# Patient Record
Sex: FEMALE | Race: WHITE | NOT HISPANIC OR LATINO | Employment: OTHER | URBAN - NONMETROPOLITAN AREA
[De-identification: names, ages, dates, MRNs, and addresses within clinical notes are randomized per-mention and may not be internally consistent; named-entity substitution may affect disease eponyms.]

---

## 2017-01-09 ENCOUNTER — AMBULATORY - GICH (OUTPATIENT)
Dept: RADIOLOGY | Facility: OTHER | Age: 77
End: 2017-01-09

## 2017-01-09 DIAGNOSIS — R92.8 OTHER ABNORMAL AND INCONCLUSIVE FINDINGS ON DIAGNOSTIC IMAGING OF BREAST: ICD-10-CM

## 2017-02-28 ENCOUNTER — AMBULATORY - GICH (OUTPATIENT)
Dept: SCHEDULING | Facility: OTHER | Age: 77
End: 2017-02-28

## 2017-03-22 ENCOUNTER — AMBULATORY - GICH (OUTPATIENT)
Dept: PEDIATRICS | Facility: OTHER | Age: 77
End: 2017-03-22

## 2017-03-24 ENCOUNTER — AMBULATORY - GICH (OUTPATIENT)
Dept: PEDIATRICS | Facility: OTHER | Age: 77
End: 2017-03-24

## 2017-06-22 ENCOUNTER — COMMUNICATION - GICH (OUTPATIENT)
Dept: SURGERY | Facility: OTHER | Age: 77
End: 2017-06-22

## 2017-06-22 ENCOUNTER — HISTORY (OUTPATIENT)
Dept: PEDIATRICS | Facility: OTHER | Age: 77
End: 2017-06-22

## 2017-06-22 ENCOUNTER — COMMUNICATION - GICH (OUTPATIENT)
Dept: PEDIATRICS | Facility: OTHER | Age: 77
End: 2017-06-22

## 2017-06-22 ENCOUNTER — OFFICE VISIT - GICH (OUTPATIENT)
Dept: PEDIATRICS | Facility: OTHER | Age: 77
End: 2017-06-22

## 2017-06-22 DIAGNOSIS — R13.14 DYSPHAGIA, PHARYNGOESOPHAGEAL PHASE: ICD-10-CM

## 2017-06-22 DIAGNOSIS — M15.0 PRIMARY GENERALIZED (OSTEO)ARTHRITIS: ICD-10-CM

## 2017-06-22 DIAGNOSIS — E78.2 MIXED HYPERLIPIDEMIA: ICD-10-CM

## 2017-06-22 DIAGNOSIS — E78.00 PURE HYPERCHOLESTEROLEMIA: ICD-10-CM

## 2017-06-22 DIAGNOSIS — E89.0 POSTPROCEDURAL HYPOTHYROIDISM: ICD-10-CM

## 2017-06-22 DIAGNOSIS — Z12.39 ENCOUNTER FOR OTHER SCREENING FOR MALIGNANT NEOPLASM OF BREAST: ICD-10-CM

## 2017-06-22 DIAGNOSIS — M85.89 OTHER SPECIFIED DISORDERS OF BONE DENSITY AND STRUCTURE, MULTIPLE SITES: ICD-10-CM

## 2017-06-22 DIAGNOSIS — R73.03 PREDIABETES: ICD-10-CM

## 2017-06-22 DIAGNOSIS — N39.41 URGE INCONTINENCE: ICD-10-CM

## 2017-06-22 DIAGNOSIS — E03.9 HYPOTHYROIDISM: ICD-10-CM

## 2017-06-22 DIAGNOSIS — F33.42 MAJOR DEPRESSIVE DISORDER, RECURRENT, IN FULL REMISSION (H): ICD-10-CM

## 2017-06-22 DIAGNOSIS — Z12.31 ENCOUNTER FOR SCREENING MAMMOGRAM FOR MALIGNANT NEOPLASM OF BREAST: ICD-10-CM

## 2017-06-22 DIAGNOSIS — I10 ESSENTIAL (PRIMARY) HYPERTENSION: ICD-10-CM

## 2017-06-22 DIAGNOSIS — E87.6 HYPOKALEMIA: ICD-10-CM

## 2017-06-22 DIAGNOSIS — R10.11 RIGHT UPPER QUADRANT PAIN: ICD-10-CM

## 2017-06-22 DIAGNOSIS — K44.9 DIAPHRAGMATIC HERNIA WITHOUT OBSTRUCTION OR GANGRENE: ICD-10-CM

## 2017-06-22 DIAGNOSIS — Z11.59 ENCOUNTER FOR SCREENING FOR OTHER VIRAL DISEASES (CODE): ICD-10-CM

## 2017-06-22 LAB
A/G RATIO - HISTORICAL: 1.4 (ref 1–2)
ABSOLUTE BASOPHILS - HISTORICAL: 0 THOU/CU MM
ABSOLUTE EOSINOPHILS - HISTORICAL: 0.1 THOU/CU MM
ABSOLUTE IMMATURE GRANULOCYTES(METAS,MYELOS,PROS) - HISTORICAL: 0 THOU/CU MM
ABSOLUTE LYMPHOCYTES - HISTORICAL: 1.6 THOU/CU MM (ref 0.9–2.9)
ABSOLUTE MONOCYTES - HISTORICAL: 0.6 THOU/CU MM
ABSOLUTE NEUTROPHILS - HISTORICAL: 3.4 THOU/CU MM (ref 1.7–7)
ALBUMIN SERPL-MCNC: 4 G/DL (ref 3.5–5.7)
ALP SERPL-CCNC: 82 IU/L (ref 34–104)
ALT (SGPT) - HISTORICAL: 19 IU/L (ref 7–52)
AMYLASE SERPL-CCNC: 15 IU/L (ref 29–103)
ANION GAP - HISTORICAL: 17 (ref 5–18)
AST SERPL-CCNC: 27 IU/L (ref 13–39)
BASOPHILS # BLD AUTO: 0.7 %
BILIRUB SERPL-MCNC: 0.9 MG/DL (ref 0.3–1)
BUN SERPL-MCNC: 15 MG/DL (ref 7–25)
BUN/CREAT RATIO - HISTORICAL: 18
CALCIUM SERPL-MCNC: 9 MG/DL (ref 8.6–10.3)
CHLORIDE SERPLBLD-SCNC: 96 MMOL/L (ref 98–107)
CHOL/HDL RATIO - HISTORICAL: 4
CHOLESTEROL TOTAL: 172 MG/DL
CO2 SERPL-SCNC: 30 MMOL/L (ref 21–31)
CREAT SERPL-MCNC: 0.85 MG/DL (ref 0.7–1.3)
EOSINOPHIL NFR BLD AUTO: 1.7 %
ERYTHROCYTE [DISTWIDTH] IN BLOOD BY AUTOMATED COUNT: 12.3 % (ref 11.5–15.5)
ESTIMATED AVERAGE GLUCOSE: 128 MG/DL
GFR IF NOT AFRICAN AMERICAN - HISTORICAL: >60 ML/MIN/1.73M2
GLOBULIN - HISTORICAL: 2.9 G/DL (ref 2–3.7)
GLUCOSE SERPL-MCNC: 105 MG/DL (ref 70–105)
HCT VFR BLD AUTO: 43.4 % (ref 33–51)
HDLC SERPL-MCNC: 43 MG/DL (ref 23–92)
HEMOGLOBIN A1C MONITORING (POCT) - HISTORICAL: 6.1 % (ref 4–6.2)
HEMOGLOBIN: 15.2 G/DL (ref 12–16)
HEPATITIS C ANTIBODY CATEGORY - HISTORICAL: NORMAL
IMMATURE GRANULOCYTES(METAS,MYELOS,PROS) - HISTORICAL: 0.3 %
LDLC SERPL CALC-MCNC: 88 MG/DL
LIPASE SERPL-CCNC: 10.5 IU/L (ref 11–82)
LYMPHOCYTES NFR BLD AUTO: 27.9 % (ref 20–44)
MCH RBC QN AUTO: 33.3 PG (ref 26–34)
MCHC RBC AUTO-ENTMCNC: 35 G/DL (ref 32–36)
MCV RBC AUTO: 95 FL (ref 80–100)
MONOCYTES NFR BLD AUTO: 10.8 %
NEUTROPHILS NFR BLD AUTO: 58.6 % (ref 42–72)
NON-HDL CHOLESTEROL - HISTORICAL: 129 MG/DL
PATIENT STATUS - HISTORICAL: ABNORMAL
PLATELET # BLD AUTO: 237 THOU/CU MM (ref 140–440)
PMV BLD: 10.4 FL (ref 6.5–11)
POTASSIUM SERPL-SCNC: 3 MMOL/L (ref 3.5–5.1)
PROT SERPL-MCNC: 6.9 G/DL (ref 6.4–8.9)
RED BLOOD COUNT - HISTORICAL: 4.57 MIL/CU MM (ref 4–5.2)
SODIUM SERPL-SCNC: 143 MMOL/L (ref 133–143)
TRIGL SERPL-MCNC: 204 MG/DL
TSH - HISTORICAL: 7.05 UIU/ML (ref 0.34–5.6)
WHITE BLOOD COUNT - HISTORICAL: 5.7 THOU/CU MM (ref 4.5–11)

## 2017-06-22 ASSESSMENT — PATIENT HEALTH QUESTIONNAIRE - PHQ9: SUM OF ALL RESPONSES TO PHQ QUESTIONS 1-9: 0

## 2017-07-20 ENCOUNTER — HISTORY (OUTPATIENT)
Dept: SURGERY | Facility: OTHER | Age: 77
End: 2017-07-20

## 2017-07-27 ENCOUNTER — HOSPITAL ENCOUNTER (OUTPATIENT)
Dept: SURGERY | Facility: OTHER | Age: 77
Discharge: HOME | End: 2017-07-27
Attending: SURGERY | Admitting: SURGERY

## 2017-07-27 ENCOUNTER — SURGERY (OUTPATIENT)
Dept: SURGERY | Facility: OTHER | Age: 77
End: 2017-07-27

## 2017-07-27 ENCOUNTER — HISTORY (OUTPATIENT)
Dept: SURGERY | Facility: OTHER | Age: 77
End: 2017-07-27

## 2017-07-27 DIAGNOSIS — K29.60 OTHER GASTRITIS WITHOUT BLEEDING: ICD-10-CM

## 2017-08-02 ENCOUNTER — AMBULATORY - GICH (OUTPATIENT)
Dept: LAB | Facility: OTHER | Age: 77
End: 2017-08-02

## 2017-08-02 DIAGNOSIS — E89.0 POSTPROCEDURAL HYPOTHYROIDISM: ICD-10-CM

## 2017-08-02 DIAGNOSIS — E87.6 HYPOKALEMIA: ICD-10-CM

## 2017-08-02 LAB
POTASSIUM SERPL-SCNC: 3.5 MMOL/L (ref 3.5–5.1)
TSH - HISTORICAL: 2.1 UIU/ML (ref 0.34–5.6)

## 2017-08-14 ENCOUNTER — HISTORY (OUTPATIENT)
Dept: PEDIATRICS | Facility: OTHER | Age: 77
End: 2017-08-14

## 2017-08-14 ENCOUNTER — OFFICE VISIT - GICH (OUTPATIENT)
Dept: PEDIATRICS | Facility: OTHER | Age: 77
End: 2017-08-14

## 2017-08-14 DIAGNOSIS — J30.9 ALLERGIC RHINITIS: ICD-10-CM

## 2017-08-14 DIAGNOSIS — J01.90 ACUTE SINUSITIS: ICD-10-CM

## 2017-08-14 DIAGNOSIS — B96.89 OTHER SPECIFIED BACTERIAL AGENTS AS THE CAUSE OF DISEASES CLASSIFIED ELSEWHERE: ICD-10-CM

## 2017-08-14 ASSESSMENT — PATIENT HEALTH QUESTIONNAIRE - PHQ9: SUM OF ALL RESPONSES TO PHQ QUESTIONS 1-9: 0

## 2017-09-01 ENCOUNTER — HOSPITAL ENCOUNTER (OUTPATIENT)
Dept: RADIOLOGY | Facility: OTHER | Age: 77
End: 2017-09-01
Attending: INTERNAL MEDICINE

## 2017-09-01 ENCOUNTER — HISTORY (OUTPATIENT)
Dept: RADIOLOGY | Facility: OTHER | Age: 77
End: 2017-09-01

## 2017-09-01 DIAGNOSIS — Z12.39 ENCOUNTER FOR OTHER SCREENING FOR MALIGNANT NEOPLASM OF BREAST: ICD-10-CM

## 2017-09-01 DIAGNOSIS — M85.89 OTHER SPECIFIED DISORDERS OF BONE DENSITY AND STRUCTURE, MULTIPLE SITES: ICD-10-CM

## 2017-09-01 DIAGNOSIS — Z12.31 ENCOUNTER FOR SCREENING MAMMOGRAM FOR MALIGNANT NEOPLASM OF BREAST: ICD-10-CM

## 2017-10-05 ENCOUNTER — AMBULATORY - GICH (OUTPATIENT)
Dept: PEDIATRICS | Facility: OTHER | Age: 77
End: 2017-10-05

## 2017-10-05 DIAGNOSIS — K29.60 OTHER GASTRITIS WITHOUT BLEEDING: ICD-10-CM

## 2017-10-24 ENCOUNTER — AMBULATORY - GICH (OUTPATIENT)
Dept: FAMILY MEDICINE | Facility: OTHER | Age: 77
End: 2017-10-24

## 2017-10-24 DIAGNOSIS — Z23 ENCOUNTER FOR IMMUNIZATION: ICD-10-CM

## 2017-11-16 ENCOUNTER — AMBULATORY - GICH (OUTPATIENT)
Dept: PEDIATRICS | Facility: OTHER | Age: 77
End: 2017-11-16

## 2017-11-16 DIAGNOSIS — K29.60 OTHER GASTRITIS WITHOUT BLEEDING: ICD-10-CM

## 2017-12-27 NOTE — PROGRESS NOTES
Patient Information     Patient Name MRN Madiha Ayon [id number] Female 1940      Progress Notes by Siddharth Kim MD at 2017  7:45 AM     Author:  Siddharth Kim MD Service:  (none) Author Type:  Physician     Filed:  2017 10:43 AM Encounter Date:  2017 Status:  Signed     :  Siddharth Kim MD (Physician)            Subjective  Madiha Beltran is a 77 y.o. female who presents for medication management, abdominal discomfort. She's been having pain in the right upper quadrant off and on. It feels like an aching without radiation. She's not found anything that makes it better or worse. She wonders if it might be related to her hiatal hernia flare. She feels this has flared because she ate a salad and she felt full up to her throat. Associated with nausea. She's not had to regurgitate any food. Sometimes fluids feel like they get stuck. This has been occurring off and on for several months. No chest pains or dyspnea. No lower extremity edema. No pleuritic chest pain. She fractured her right wrist after a fall in [place] which has healed up. She has a history of major depressive disorder which is in remission with the use of Prozac. History of hypertension which has been stable on hydrochlorothiazide, metoprolol. History of overflow urinary incontinence and continues on oxybutynin. History of hyperlipidemia which has been stable on Lipitor. She continues on aspirin prophylaxis. History of hypothyroidism stable on levothyroxine. History of osteoarthritis of multiple joints and continues on Celebrex. The last time she tried to stop Celebrex she had pain in all the joints of her body so she resumed it. She would like a mammogram, believes she is due at the end of the summer. Her colonoscopies are up-to-date, last was last year.    Review of Systems:  Constitutional: Normal  Eyes: Normal  Ears/nose/mouth/throat: See HPI  Cardiology/vascular: Normal  Respiratory:  "Normal  Psychiatric: Normal  GI: See HPI  : Normal  Musculoskeletal: See HPI. She's gotten shorter over the last year or 2. She's never been on Fosamax. She takes calcium plus vitamin D supplementation.  Neurological: Normal  Endocrine: Normal  Hematological/lymphatic: Normal  Integumentary: Normal  Allergy/immunizations: Normal      Problem List/PMH: reviewed in EMR, and made relevant updates today.  Medications: reviewed in EMR, and made relevant updates today.  Allergies: reviewed in EMR, and made relevant updates today.    Social Hx:  Social History     Substance Use Topics       Smoking status: Never Smoker     Smokeless tobacco: Never Used     Alcohol use No     Social History Narrative    , lives with  Janessa.     Moved into the apartments on [address] in [place]          I reviewed social history and made relevant updates today.    Family Hx:   Family History       Problem   Relation Age of Onset     Hypertension  Mother      Heart Disease  Mother      CHF       Hypertension  Father      Heart Disease  Father      CHF       Stroke  Maternal Grandmother      Hypertension  Maternal Grandmother      Stroke  Maternal Grandfather      Good Health  Maternal Grandfather      Emphysema       Cancer-breast  Other 37     fast growing       Diabetes  Brother      Good Health  Sister      Other  Neg.      no lupus or RA       Arthritis  Brother      psoriatic arthritis       Other  Brother       from burst blood vessel, pneumonia on Enbrel       Cancer-breast  Other      couple of aunts, youngest at mid 40s       Asthma  No Family History        Objective  Vitals: reviewed in EMR.  /86  Pulse 68  Ht 1.632 m (5' 4.25\")  Wt 85.7 kg (189 lb)  BMI 32.19 kg/m2    Gen: Pleasant female, NAD.  HEENT: MMM, no OP erythema.   Neck: Supple, no JVD, no bruits.  CV: RRR no m/r/g.   Pulm: CTAB no w/r/r  GI: Soft, NT, ND. No HSM. No masses. Bowel sounds present.  Neuro: Grossly intact  Msk: " No lower extremity edema.  Skin: No concerning lesions.  Psychiatric: Normal affect and insight. Does not appear anxious or depressed.    Diabetes Labs  Lab Results      Component  Value Date/Time    HGBA1C 6.1 06/22/2017 08:25 AM    CHOL 172 06/22/2017 08:25 AM    HDL 43 06/22/2017 08:25 AM    LDLCHOL 88 06/22/2017 08:25 AM    TRIGLYCERIDE 204 (H) 06/22/2017 08:25 AM    CREATININE 0.85 06/22/2017 08:25 AM     bone density scan report dated August 7, 2014 was reviewed with the patient today: Osteopenia    Assessment    ICD-10-CM    1. Esophageal dysphagia R13.14 GASTROSCOPY/EGD - [facility]   2. HYPERCHOLESTEROLEMIA E78.00 atorvastatin (LIPITOR) 40 mg tablet   3. Primary osteoarthritis involving multiple joints M15.0 celecoxib (CELEBREX) 200 mg capsule   4. Hypertension I10 hydroCHLOROthiazide (HCTZ) 25 mg tablet      metoprolol (LOPRESSOR) 100 mg tablet      COMPLETE METABOLIC PANEL      COMPLETE METABOLIC PANEL   5. Recurrent major depressive disorder, in full remission (HC) F33.42 FLUoxetine (PROZAC) 20 mg capsule   6. Post-surgical hypothyroidism E89.0 levothyroxine (SYNTHROID) 150 mcg tablet   7. Urge incontinence N39.41 oxybutynin (DITROPAN) 5 mg tablet   8. Hiatal hernia K44.9    9. Pre-diabetes R73.03 COMPLETE METABOLIC PANEL      HEMOGLOBIN A1C MONITORING (POCT)      COMPLETE METABOLIC PANEL      HEMOGLOBIN A1C MONITORING (POCT)   10. Breast cancer screening, high risk patient Z12.39 XR MAMMO BILAT SCREENING   11. RUQ abdominal pain R10.11 GASTROSCOPY/EGD - [facility]      COMPLETE METABOLIC PANEL      CBC WITH DIFFERENTIAL      LIPASE      AMYLASE      ANTI HCV      COMPLETE METABOLIC PANEL      CBC WITH DIFFERENTIAL      LIPASE      AMYLASE      ANTI HCV      CBC WITH AUTO DIFFERENTIAL   12. Mixed hyperlipidemia E78.2 LIPID PANEL      LIPID PANEL   13. Hypothyroidism, unspecified type E03.9 TSH      TSH   14. Encounter for screening mammogram for malignant neoplasm of breast  Z12.31 XR MAMMO BILAT SCREENING   15.  Osteopenia of multiple sites M85.89 XR DXA BONE DENSITY 2 SITES AXIAL   16. Need for hepatitis C screening test Z11.59 ANTI HCV      ANTI HCV       I'm concerned that her abdominal discomfort with dysphagia may be related to an esophageal stricture however differential diagnosis also includes esophageal cancer, external compression from lymphoma, esophagitis, others. Her right upper quadrant abdominal discomfort may be from gastritis however differential diagnosis also includes peptic ulcer disease, others. She is status post cholecystectomy.    Plan   -- Expected clinical course discussed   -- Medications and their side effects discussed  Patient Instructions    -- Schedule EGD   -- Work on 5% weight loss   -- Consider discontinue trial of celebrex if you have another great year with weight loss    Your BMI is Body mass index is 32.19 kg/(m^2).  (BMI ranges: Normal 18.5 - 25, Overweight 25 - 30, Obesity greater than 30,     Morbid Obesity greater than 40 or greater than 35 with associated conditions.)    Facts about losing weight:   -- Overweight and Obesity increase your risk for developing diabetes, high blood pressure and stroke, and shorten your life.   -- 90% of weight loss comes from dietary changes, only 10% from exercise    What should I do?   -- Work on 5-10% weight loss   -- Focus on a few healthy dietary changes   -- Eat more fresh fruits and vegetables, and fewer carbohydrates   -- Cut out all calorie-containing beverages (milk, juice, alcohol, etc)   -- Exercise every day   -- Weigh yourself once a week   -- Consider the DASH Diet (http://Mobilewalla.Loom Decor/DASHDiet - redirects to the Socogame)   -- Consider Weight Watchers (http://www.weightwatchBigML.com)   -- Consider My Fitness Pal (iOS, Android, http://www.Vastaripal.Cell>Point)            Signed, Siddharth Kim MD  Internal Medicine & Pediatrics

## 2017-12-28 NOTE — OR POSTOP
Patient Information     Patient Name MRN Sex Madiha Ly [id number] Female 1940      OR PostOp by Carol Galo RN at 2017  1:14 PM     Author:  Carol Galo RN Service:  (none) Author Type:  NURS- Registered Nurse     Filed:  2017  1:15 PM Date of Service:  2017  1:14 PM Status:  Signed     :  Carol Galo RN (NURS- Registered Nurse)            Discharge Note    Data:  Madiha Beltran has been discharged home at 1205 via ambulatory accompanied by Registered Nurse.      Action:  Written discharge/follow-up instructions were provided to patient. Prescriptions : None.  Belongings sent with patient. Medications from home sent with patient/family: Not Applicable  Equipment none .     Response:  Patient verbalized understanding of discharge instructions, reason for discharge, and necessary follow-up appointments.

## 2017-12-28 NOTE — TELEPHONE ENCOUNTER
Patient Information     Patient Name MRN Madhia Ayon [id number] Female 1940      Telephone Encounter by Catherine Christy MD at 2017  9:58 AM     Author:  Catherine Christy MD Service:  (none) Author Type:  Physician     Filed:  2017  9:58 AM Encounter Date:  2017 Status:  Signed     :  Catherine Christy MD (Physician)            Ok to schedule. Thanks! Catherine Christy MD ....................  2017   9:58 AM

## 2017-12-28 NOTE — OR ANESTHESIA
Patient Information     Patient Name MRN Sex     Madiha Beltran [id number] Female 1940      OR Anesthesia by Son Sanches CRNA at 2017 11:41 AM     Author:  Son Sanches CRNA Service:  (none) Author Type:  NURS- Nurse Anesthetist     Filed:  2017 11:42 AM Date of Service:  2017 11:41 AM Status:  Signed     :  Son Sanches CRNA (NURS- Nurse Anesthetist)            Anesthesia Post Operative Care Note    Name: Madiha Beltran  MRN:   [id number]  :    1940       Procedure Done:  See Surgeon Note        Anesthesia Technique    Anesthetic Type:  MAC       MAC Type:  NC     Oral Trauma:  No    Intraoperative Course   Hemodynamics:  Stable    Ventilation Normal:  Yes Lung Sounds:  Normal      PACU Course    Airway Status:  Extubated     Nondepolarizer Used:       Reversed: N/A   Hemodynamics:  Stable      Hydration: Euvolemic   Temperature:  36.1 - 38.3      Mental Status:  Awake, alert, follows commands   Pain Management:  Adequate   Regional Block:  No      Vital Signs:  Temp: 99  F (37.2  C)  Pulse: (!) 49  BP: 143/81  Resp: 16  SpO2: 97 %                       Active Lines:  Patient Lines/Drains/Airways Status    Active Line     Name: Placement date: Placement time: Site: Days:    PERIPHERAL VAD Right Hand 17   1030   Hand   less than 1                Intake & Output:       Labs:  No results for input(s): CV8QHUYRDWZ, GAO5BFQAEHAQ, PHARTERIAL, FQN9AEKVEROX, J2CGVCPUGUXO in the last 24 hours.    No results for input(s): MAGNESIUM in the last 24 hours.    No results for input(s): GLUCOSEMETER in the last 720 hours.        Son Sanches CRNA ....................  2017   11:41 AM

## 2017-12-28 NOTE — PROGRESS NOTES
Patient Information     Patient Name MRN Madiha Ayon [id number] Female 1940      Progress Notes by Siddharth Kim MD at 2017 11:30 AM     Author:  Siddharth Kim MD Service:  (none) Author Type:  Physician     Filed:  2017 11:57 AM Encounter Date:  2017 Status:  Signed     :  Siddharth Kim MD (Physician)            Subjective  Madiha Beltran is a 77 y.o. female who presents for possible sinus infection. Symptoms started a week ago Saturday. She has developed thick green nasal drainage. She's coughing a lot. She can't sleep due to coughing. She's had a sore throat but that's improved. No tenderness over the maxillary or facial areas. No tooth pain. She what has been sneezing. She's had itchy watery eyes. No fever. No popping of the ears. No known sick contacts. No travel. She moved into a new apartment but was living there for 3 weeks before symptoms developed. They have new carpet and painting. Last winter the same thing occurred when she was in [place] and she attributed to 2 much dust. She's been having heartburn complicated by history of hiatal hernia and recently had testing done by Dr. Christy who recommended the addition of sucralfate.    Problem List/PMH: reviewed in EMR, and made relevant updates today.  Medications: reviewed in EMR, and made relevant updates today.  Allergies: reviewed in EMR, and made relevant updates today.    Social Hx:  Social History     Substance Use Topics       Smoking status: Never Smoker     Smokeless tobacco: Never Used     Alcohol use No     Social History Narrative    , lives with  Janessa.     Moved into the apartments on [address]     in [place]          I reviewed social history and made relevant updates today.    Family Hx:   Family History       Problem   Relation Age of Onset     Hypertension  Mother      Heart Disease  Mother      CHF       Hypertension  Father      Heart Disease  Father      CHF    "    Stroke  Maternal Grandmother      Hypertension  Maternal Grandmother      Stroke  Maternal Grandfather      Good Health  Maternal Grandfather      Emphysema       Cancer-breast  Other 37     fast growing       Diabetes  Brother      Good Health  Sister      Other  Neg.      no lupus or RA       Arthritis  Brother      psoriatic arthritis       Other  Brother       from burst blood vessel, pneumonia on Enbrel       Cancer-breast  Other      couple of aunts, youngest at mid 40s       Asthma  No Family History      Allergic rhinitis  No Family History        Objective  Vitals: reviewed in EMR.  /82  Pulse 64  Temp 98.1  F (36.7  C) (Tympanic)   Ht 1.632 m (5' 4.25\")  Wt 83.5 kg (184 lb)  BMI 31.34 kg/m2    Gen: Pleasant female, NAD.  HEENT: MMM, no OP erythema. Nasal turbinates are pink. Tympanic membranes normal.  Neck: Supple, no lymphadenopathy  CV: RRR no m/r/g.   Pulm: CTAB no w/r/r  Neuro: Grossly intact  Msk: No lower extremity edema.  Skin: No concerning lesions.  Psychiatric: Normal affect and insight. Does not appear anxious or depressed.    Assessment    ICD-10-CM    1. Acute bacterial sinusitis J01.90 azithromycin (ZITHROMAX Z-MANOLO) 250 mg tablet     B96.89 benzonatate (TESSALON PERLES) 100 mg capsule   2. 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 We discussed options and decided to treat with antibiotics and some initial oral allergy medication. If symptoms persist or worsen recommend repeat evaluation as I would consider starting nasal corticosteroid and/or obtaining CT sinus, versus ENT consult as all of the symptoms could certainly be connected to her gastroesophageal reflux disease.    Plan   -- Expected clinical course discussed   -- Medications and their side " effects discussed  Patient Instructions    -- Neti pot   -- Think about trying a daily loratadine   --  Use diphenhydramine 25 mg before bed as needed. Watch for constipation   -- Azithromycin   -- Eat yogurt 1-2 times per day while on antibiotics (and for a few weeks after) to reduce the chances of diarrhea   -- Honey for cough   -- Try tessalon for cough   -- Elevate head   -- HEPA filter   -- Follow-up if symptoms persist/worsen       Index [place] Related topics   Allergies: Controlling Your Environment   ________________________________________________________________________  KEY POINTS    Pollen from trees and plants, mold, house dust, animal dander, cockroaches, cigarette smoke, perfume or other strong odors, smog, and cold air can cause or worsen allergy symptoms.    You can lessen your allergy symptoms if you limit your contact with things that cause your symptoms, especially in places where you spend a lot of time, such as at home, school, or your office.  ________________________________________________________________________  If you have allergies, many things inside and outside your home can cause or worsen symptoms. The things that cause symptoms are called triggers. Some common triggers in the environment are pollen from trees and plants, mold, house dust, animal dander, cockroaches, cigarette smoke, perfume or other strong odors, smog, and cold air.   You can lessen your allergy symptoms if you limit your contact with your triggers, especially in places where you spend a lot of time, such as at home, school, or your office. Here are some things you can do.  Pollens   Pollens are small particles that plants such as trees, grasses, and weeds release into the air. The amount of pollen in the air outdoors varies with the season and the time of day. Pollen and outdoor mold amounts tend to be lower in the early morning and higher at midday and in the afternoon.  Pollens from grasses, weeds, and some  trees can be carried through the air for miles. These pollens land in the eyes, nose, and airways, causing the symptoms of allergies. You may not be able to avoid all pollens, but it helps if you:    Keep doors and windows shut in the pollen season. Don t use window or attic fans because they blow the dust and pollen around. Use an air conditioner, if you have one, in your house and car. If a room air conditioner is used, recirculate the indoor air rather than pulling air in from outside. Wash or change air filters once a month. Doing these things can also help keep things like dust and smog out of your home.    Stay away from trees and grasses as much as you can in the pollen season and wear a hat to keep pollen from getting into your hair.    After being outside during allergy season, shower, wash your hair, and change your clothes right away. Do not keep the dirty clothes in bedrooms because there may be pollen on the clothes.    Dry your clothes in a dryer, not outside.    Find a reliable source for local pollen counts and, if possible, stay indoors on days when the pollen count is high.  Mold   Molds are found year-round throughout the house, outdoors, and in foods, but especially in areas of high moisture. Molds blow around in the air both outdoors and indoors. Bathrooms and damp basements are common areas for mold growth. Mold is also very likely to grow in swamp coolers, humidifiers, and the refrigerator drip pan and crisper. Here are some ways to decrease mold growth:    Fix all leaks and clean the kitchen and bathroom tile, floors, shower curtain, and tub thoroughly and often. Also clean under the sink. Use a cleaning solution that kills molds. For example, you can use diluted household bleach (1 cup of bleach in 10 cups of water).    Use paint rather than wallpaper on your walls. Enamel paint stops mold growth better than latex paint. An antifungal substance can be added to paints to keep mold from  growing.    It is best to keep the humidity in the house between 30% and 50%. Buy a dehumidifier to take moisture out of the air if you live in a humid climate. Dehumidifiers can help keep mold from growing in damp places like basements. Look for areas that get damp from hard rains and fix any leaks that you find.    Avoid evaporative coolers (also called swamp or desert coolers), vaporizers, and humidifiers with a reservoir, if you can, because they are ideal places for mold and bacteria to grow. When these appliances are operating, molds and bacteria can be sprayed throughout the house. If you do use one, empty the reservoir daily, clean it with soap and water, and dry it thoroughly. The reservoir should be refilled just before use.    Greenhouses, compost piles, and homes with many plants also frequently have molds. Cover the potting soil of houseplants with foil to reduce the spread of mold spores.    Remove any dry rot in wood frame housing.    Clean indoor trashcans with diluted bleach (1 part bleach to 10 parts water) and keep them dry.  Dust mites  Dust mites are very tiny, spiderlike bugs that you can only see with a microscope. Their body parts and droppings are what you breathe in and can be allergic to. Dust mites can be found in mattresses, pillows, carpet, upholstered furniture, bedding, clothes, and soft toys. They are much less of a problem at high altitudes (over 3000 feet above sea level) or in very dry climates unless you use a humidifier in your home.   It s not possible to get rid of dust mites completely, but there are things you can do to help.    Avoid clutter and dust catchers, particularly in the bedroom. These include knickknacks, wall decorations (pictures, pennants, and fabric wall coverings), drapes, shades or blinds, stacks of books, and piles of papers or toys.    Keep the bedroom closet door closed. Store only in-season clothes in the closet.    Bare floors are best. You can replace  carpet with washable, nonskid rugs. Damp mop the floors often. If you have carpet, vacuum often and thoroughly. Replace vacuum bags and change vacuum  filters often. Be sure to clean under the furniture and in the closet.    Mattresses should be in coverings that are allergen-proof, such as plastic. You can get allergen-proof coverings where bed linens are sold. Zippers or openings should be taped shut. Cover pillows with allergen-proof covers or wash the pillows each week in hot water. Also wash blankets, sheets, and pillowcases in very hot water (at least 130  F, or 54.4  C) every week. Cooler water used with detergent and bleach can also work. Be sure linens and pillows are completely dry before using them.    Forced-air furnaces should have a dust-filtering system. Filters should be changed as often as recommended by the . Filters can be cut to cover room vents if the central furnace filters are not changed often enough. Cold and warm air ducts should be professionally cleaned at least every 4 to 5 years.    Use an air  with a high-efficiency particulate air (HEPA) filter or an electrostatic filter.    Try not to sleep or lie on cloth-covered cushions or furniture.    Keep stuffed toys out of the bed, or wash the toys weekly in hot water or in cooler water with detergent and bleach.    If you usually get symptoms during housecleaning or yard work, wear a mask (available in [facility]) over your nose and mouth during these chores.  Pets   Pet allergens are found in animal dander (dried skin flakes or dandruff), saliva, and urine. They cause allergic reactions in many people. You may be more sensitive to one type of animal (such as cats) than another. All furry and feathered animals can cause allergic reactions.   If you are sensitive to animals and have a pet, the pet should live outside or stay in just one part of the house and NEVER be in the bedroom. Have family members brush or comb  pets outdoors. Wash your hands right after touching a pet.  Giving away a family pet is very hard, but if someone in your home is very sensitive, it may be necessary. Once the pet is gone, thoroughly clean the house. It is especially important to clean stuffed furniture, wall surfaces, carpets, rugs, drapes, and the heating/cooling system.   Cockroaches   Cockroaches and their droppings are a common cause of allergies. To get rid of cockroaches:    Keep food and garbage in containers with tight lids. Take garbage out daily before bedtime.    Never leave food out. Especially keep it out of bedrooms. Don t leave pet food or dirty food bowls out.    Vacuum or sweep the floor, wash the dishes, and wipe off countertops and the stove right after meals. Keep cupboards clean.    Fix water sources that attract these pests, such as leaky faucets and drain pipes.    Plug cracks around the house to help stop cockroaches from getting in.    Don t store paper bags, newspapers, or cardboard boxes.    Use bait stations and other environmentally safe vasquez poisons.  Smoke and strong smells  Avoid smoke from tobacco products, wood-burning fireplaces, and stoves. Stay away from areas with heavy smoke from grass or forest fires. If others want to smoke, they should smoke outside. No smoking should be allowed in the car or in the house.   Try not to breathe fumes from paint, insecticides, gasoline, household , or other products with strong smells.  Developed by [employer].  Adult Advisor 2016.3 published by [employer].  Last modified: 2016-05-12  Last reviewed: 2014-12-31  This content is reviewed periodically and is subject to change as new health information becomes available. The information is intended to inform and educate and is not a replacement for medical evaluation, advice, diagnosis or treatment by a healthcare professional.  References   Adult Advisor 2016.3 Index    Copyright   2016 [facility], a division of  [employer] All rights reserved.         No Follow-up on file.    Signed, Siddharth Kim MD  Internal Medicine & Pediatrics

## 2017-12-28 NOTE — TELEPHONE ENCOUNTER
Patient Information     Patient Name MRN Madiha Ayon [id number] Female 1940      Telephone Encounter by Stacy De Leon at 2017 12:56 PM     Author:  Stacy De Leon Service:  (none) Author Type:  (none)     Filed:  2017  1:02 PM Encounter Date:  2017 Status:  Signed     :  Stacy De Leon            Screening Questions for the Scheduling of Screening Colonoscopies   (If Colonoscopy is diagnostic, Provider should review the chart before scheduling.)  Are you younger than 50 or older than 80?  NO   Do you take aspirin or fish oil?  ASPRIN  (if yes, tell patient to stop 1 week prior to Colonoscopy)  Do you take warfarin (Coumadin), clopidogrel (Plavix), apixaban (Eliquis), dabigatram (Pradaxa), rivaroxaban (Xarelto) or any blood thinner? NO  Do you use oxygen at home?  NO  Do you have kidney disease? NO  Are you on dialysis? NO  Have you had a stroke or heart attack in the last year? NO  Have you had a stent in your heart or any blood vessel in the last year? NO  Have you had a transplant of any organ? NO  Have you had a colonoscopy or upper endoscopy (EGD) before? YES  - COLONOSCOPY          When?  2016- GI   Date of scheduled EGD - 2017  Provider [facility]    Pharmacy

## 2017-12-28 NOTE — PROGRESS NOTES
Patient Information     Patient Name MRN Madiha Ayon [id number] Female 1940      Progress Notes by Alyssa Chang R.T. (ARRT) at 2017 10:03 AM     Author:  Alyssa Chang R.T. (ARRT) Service:  (none) Author Type:  (none)     Filed:  2017 10:07 AM Date of Service:  2017 10:03 AM Status:  Signed     :  Alyssa Chang R.T. (ORALT) ([facility] - Registered Radiologic Technologist)            Falls Risk Criteria:    Age 65 and older or under age 4        Sensory deficits    Poor vision    Use of ambulatory aides    Impaired judgment    Unable to walk independently    Meets High Risk criteria for falls:  Yes               1.  Do you have dizziness or vertigo?    no                    2.  Do you need help standing or walking?   no                 3.  Have you fallen within the last 6 months?    no           4.  Has the patient been fasting?      no       If any risks are marked Yes, the following interventions are utilized:    Do not leave patient unattended     Assist patient in the dressing room and bathroom    Have ambulatory aides available throughout procedure    Involve patient s family if available

## 2017-12-28 NOTE — TELEPHONE ENCOUNTER
Patient Information     Patient Name MRN Madiha Ayon [id number] Female 1940      Telephone Encounter by Siddharth Kim MD at 10/9/2017  8:00 AM     Author:  Siddharth Kim MD Service:  (none) Author Type:  Physician     Filed:  10/9/2017  8:00 AM Encounter Date:  10/5/2017 Status:  Signed     :  Siddharth Kim MD (Physician)            Yes, if it is working keep it up.      ICD-10-CM    1. 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     Dispense:  90 tablet     Refill:  4     Signed, Siddharth Kim MD  Internal Medicine & Pediatrics

## 2017-12-28 NOTE — TELEPHONE ENCOUNTER
Patient Information     Patient Name MRN Madiha Ayon [id number] Female 1940      Telephone Encounter by Siddharth Kim MD at 2017  3:33 PM     Author:  Siddharth Kim MD Service:  (none) Author Type:  Physician     Filed:  2017  3:34 PM Encounter Date:  2017 Status:  Signed     :  Siddharth Kim MD (Physician)            I called and spoke with Ms. Beltran. We discussed her labs. TSH slightly elevated. She's not missed any doses. She's not been ill recently. We discussed options including increasing her dose or simply rechecking an and she prefers just to recheck it. Her potassium is low. She sounds like she's had a low potassium in the past and was on supplementation with Dr. Gallo. I believe her current hypokalemia is secondary to hydrochlorothiazide. We discussed options including potassium supplementation, switching antihypertensive agents. Ultimately she decided she would like to supplement the potassium. We should recheck.     -- Lab only visit in 4 weeks for TSH and potassium   -- start a potassium supplement, prescription sent to her pharmacy.    Signed, Siddharth Kim MD  Internal Medicine & Pediatrics

## 2017-12-28 NOTE — PROCEDURES
Patient Information     Patient Name MRN Sex Madiha Ly [id number] Female 1940      Procedures by Catherine Christy MD at 2017 11:27 AM     Author:  Catherine Christy MD Service:  (none) Author Type:  Physician     Filed:  2017 11:31 AM Date of Service:  2017 11:27 AM Status:  Signed     :  Catherine Christy MD (Physician)        Pre-procedure Diagnoses:    1. Other dysphagia [R13.19]           Post-procedure Diagnoses:    1. Hiatal hernia [K44.9]    2. Bile reflux gastritis [K29.60]    3. Gastric polyps [K31.7]           Procedures:    1. ESOPHAGOGASTRODUODENOSCOPY [[id number] (Custom)]               PROCEDURE NOTE    SURGEON: Catherine Christy MD.    PRE-OP DIAGNOSIS:   dysphagia      POST-OP DIAGNOSIS: small (less than 2 cm) hiatal hernia, bile reflux and gastritis, gastric polyps    PROCEDURE PLANNED:   Diagnostic EGD, flexible        PROCEDURE PERFORMED:  EGD with biopsy, flexible    SPECIMEN:  Antrum, gastric polyp, gej biopsies    ANESTHESIA: See anesthesia record, Coverage requested due to: age more than 70    ESTIMATED BLOOD LOSS: None    COMPLICATIONS:  None    INDICATION FOR THE PROCEDURE: The patient is a 77 y.o.female. The patient presents with dysphagia. I explained to the patient the risks, benefits and alternatives to diagnostic EGD for evaluating and possibly treating her dysphagia. We specifically discussed the risks of bleeding, infection, perforation and the risks of sedation. The patient's questions were answered and the patient wished to proceed. Informed consent paperwork was completed.    PROCEDURE: The patient was taken to the endoscopy suite. Appropriate monitors were attached. The patient was placed in the left lateral decubitus position. Bite block was positioned.Timeout was performed confirming the patient's identity and procedure to be performed. After appropriate sedation was confirmed, the flexible endoscope was advanced into the oropharynx. The posterior  oropharynx appeared grossly normal. The scope was advanced into the proximal esophagus. The esophagus was insufflated with air. The scope was advanced under direct visualization. No acute abnormalities of the esophagus were noted. No stricture was noted. The scope was advanced into the stomach. Bile reflux and mild gastritis was noted. The scope was advanced through the pylorus into the duodenal bulb. The bulb and distal duodenum appeared grossly normal. The scope was withdrawn back into the stomach. Antral biopsy was obtained and sent to pathology. The scope was retroflexed and the GE junction inspected. Small hiatal hernia was noted. The scope was returned to a neutral position and the stomach was decompressed. The scope was withdrawn to the GE junction. The hiatal hernia was less than 2 cm. Biopsies were obtained. The mucosa of the esophagus was inspected while withdrawing the scope. No abnormalities were noted. The scope was withdrawn from the patient. The bite block was removed. The patient tolerated the procedure with no immediately apparent complication. The patient was taken to recovery in stable condition.    FOLLOW UP:  RECOMMENDATIONS start carafate three times daily, will call with pathology results.     Catherine Christy MD

## 2017-12-28 NOTE — OR ANESTHESIA
Patient Information     Patient Name MRN Sex Madiha Ly [id number] Female 1940      OR Anesthesia by Son Sanches CRNA at 2017 10:28 AM     Author:  Son Sanches CRNA Service:  (none) Author Type:  NURS- Nurse Anesthetist     Filed:  2017 10:29 AM Date of Service:  2017 10:28 AM Status:  Signed     :  Son Sanches CRNA (NURS- Nurse Anesthetist)                                                           ANESTHESIA ASSESSMENT    Date: 17 Time: 10:29 AM      Patient:  Madiha Beltran    Procedure(s) (LRB):  ESOPHAGOGASTRODUODENOSCOPY (N/A)    Past Medical History:     Diagnosis  Date     Bursitis of right hip      Chronic hepatitis (HC)     Chronic hepatitis, mild, stable      Endometrial carcinoma (HC)      Esophageal reflux      Hiatal hernia      Hypercholesterolemia      Hypertriglyceridemia      Hypothyroidism      Loose stools      Major depressive disorder, recurrent episode, in full remission (HC)      Multinodular goiter      Myxoma     Benign myxoma, right knee      Osteopenia      Peptic disease     Peptic acid disease, esophageal reflux      Prediabetes      Primary generalized hypertrophic osteoarthrosis      Seborrheic keratosis      Tear of meniscus of right knee     Torn medial and lateral meniscus right knee with popliteal cyst      Urinary frequency      Venous stasis      Wheezing     Wheezing with possible angina        Past Surgical History:      Procedure  Laterality Date     CHOLECYSTECTOMY       COLONOSCOPY  06    Colonoscopy normal, colonoscopy due in        COLONOSCOPY SCREENING      Colonoscopy - Next one due         COLONOSCOPY SCREENING  2016    wnl no need for fu       ESOPHAGOGASTRODUODENOSCOPY       HAND FINGER SURGERY      Reconstruct left first metacarpocarpal joint        HERNIA REPAIR      Periumbilical incisional hernia, repaired        HM DEXA SCAN      DXA normal       KNEE ARTHROSCOPY       Right knee surgery removal of myxoma       KNEE ARTHROSCOPY  01    Arthroscopy left knee and right knee.        KNEE REPLACEMENT  2011    right knee arthroplasty       KNEE REPLACEMENT  2011     left knee arthroplasty       LAPAROSCOPY ABDOMEN DIAGNOSTIC       PA COLONOSCOPY BIOPSY SINGLE OR MULTIPLE  2014            ILIANA AND BSO  97    ILIANA/BSO for endometrial carcinoma       ILIANA AND BSO  1999     THYROIDECTOMY  2004    Bilateral thyroidectomy for Hashimoto's thyroiditis       TUBAL LIGATION         Family History       Problem   Relation Age of Onset     Hypertension  Mother      Heart Disease  Mother      CHF       Hypertension  Father      Heart Disease  Father      CHF       Stroke  Maternal Grandmother      Hypertension  Maternal Grandmother      Stroke  Maternal Grandfather      Good Health  Maternal Grandfather      Emphysema       Cancer-breast  Other 37     fast growing       Diabetes  Brother      Good Health  Sister      Other  Neg.      no lupus or RA       Arthritis  Brother      psoriatic arthritis       Other  Brother       from burst blood vessel, pneumonia on Enbrel       Cancer-breast  Other      couple of aunts, youngest at mid 40s       Asthma  No Family History        Patient Active Problem List     Diagnosis  Code     Post-surgical hypothyroidism E89.0     HEPATITIS K75.9     CARCINOMA, ENDOMETRIUM C54.9     BURSITIS, HIP M76.899     LATERAL MENISCUS TEAR, RIGHT S83.289A     MEDIAL MENISCUS TEAR, RIGHT [id number]     POPLITEAL CYST, RIGHT M71.20     GOITER, MULTINODULAR E04.2     CATARACTS H26.9     ALKALINE PHOSPHATASE, ELEVATED R74.8     ANEMIA D64.9     ACP (advance care planning) Z71.89     Scoliosis M41.9     Dysphagia R13.10     Hx multinodular goiter s/p thyroidectomy E04.2     Obesity E66.9     Venous stasis of lower extremity I87.8     Hiatal hernia K44.9     Transaminase or LDH elevation R74.0     Loose stools R19.5     Osteopenia M85.80     Prediabetes  R73.03     Urge incontinence N39.41     Seborrheic keratoses L82.1     Venous stasis of both lower extremities I87.8     Mixed hyperlipidemia E78.2     Gastroesophageal reflux disease K21.9     Primary osteoarthritis involving multiple joints M15.0     Recurrent major depressive disorder, in full remission (HC) F33.42     Special screening for malignant neoplasms, colon Z12.11       Prescriptions Prior to Admission       Medication  Sig Dispense Refill     aspirin chewable 81 mg chewable tablet Take 81 mg by mouth once daily with a meal.       atorvastatin (LIPITOR) 40 mg tablet Take 1 tablet by mouth once daily. 90 tablet 4     celecoxib (CELEBREX) 200 mg capsule Take 1 capsule by mouth 2 times daily with meals. 180 capsule 4     clindamycin (CLEOCIN) 300 mg capsule TK 2 CS PO 1 HOUR PRIOR TO PROCEDURE  12     COQ10, UBIQUINOL, ORAL Take  by mouth.       DAILY MULTI-VITAMIN ORAL Take  by mouth.       fluocinonide 0.05% topical (LIDEX) 0.05 % cream Apply  topically to affected area(s) 2 times daily. 30 g 3     FLUoxetine (PROZAC) 20 mg capsule Take 1 capsule by mouth every morning. 90 capsule 4     hydroCHLOROthiazide (HCTZ) 25 mg tablet Take 25 mg daily 90 tablet 4     levothyroxine (SYNTHROID) 150 mcg tablet Take 1 tablet by mouth once daily. 90 tablet 4     metoprolol (LOPRESSOR) 100 mg tablet Take 1 tablet by mouth 2 times daily. 180 tablet 4     oxybutynin (DITROPAN) 5 mg tablet Take 1 tablet by mouth once daily. 90 tablet 4     potassium chloride (POTASSIUM CHLORIDE) 10 mEq tablet Take 1 tablet by mouth once daily with a meal. 90 tablet 1       Allergies:  Allergies      Allergen   Reactions     Ivp Dye [Diatrizoate Meglumine (Iv Contrast Dye)]  Hives     Penicillin G Benzathin,Procain  Nausea Only     Became faint      Sulfa (Sulfonamide Antibiotics)  Rash       Review of Systems:  GERD: Yes (No current symptoms. )  Chest pain: No  Shortness of breath: No  Recent fever: No  Poor exercise tolerance:  No  Bleeding tendency: No  Pregnant: No  Anesthesia Complications: None      History    Smoking Status      Never Smoker   Smokeless Tobacco      Never Used     Social History     Social History        Marital status:       Spouse name: N/A     Number of children:  N/A     Years of education:  N/A     Social History Main Topics       Smoking status: Never Smoker     Smokeless tobacco: Never Used     Alcohol use No     Drug use: Not on file     Sexual activity: Not on file     Other Topics  Concern     Not on file      Social History Narrative     , lives with  Janessa.     Moved into the apartments on [facility]           Physical Examination:  There were no vitals taken for this visit. There is no height or weight on file to calculate BMI. There is no height or weight on file to calculate BSA.  Dental Condition: Good     Mallampati Score (Airway): II  Cardiovascular: Normal  Pulmonary: Normal  Other: (not recorded)    [facility]    No results for input(s): SODIUM, POTASSIUM, CHLORIDE, DD7MINFV, ANIONGAP, BUN, CREATININE, BUNCREARATIO, CALCIUM, GLUCOSE, GLUCOSEMETER, KETONES, MAGNESIUM, WBC, HGB, HCT, PLT, ABORH, RHTYPE, PREGURINE, BHCGQL, HCGBETAQUANT, INR in the last 72 hours.          Assessment/Plan:  ASA Class: II  Risk of dental injury discussed: Yes  NPO status confirmed: Yes (Greater than 8 hours.)  Anesthetic Plan: MAC  Risk/Benefit/Alt discussed: Yes  Questions answered: Yes  Emergency Case?: No  Labs/ECG/Radiology Reviewed?: Yes      H&P Reviewed.  Patient Examined.      Provider Electronic Signature:  Son Sanches CRNA

## 2017-12-28 NOTE — TELEPHONE ENCOUNTER
Patient Information     Patient Name MRN Sex Madiha Ly [id number] Female 1940      Telephone Encounter by Stacy De Leon at 2017  9:29 AM     Author:  Stacy De Leon Service:  (none) Author Type:  (none)     Filed:  2017  9:31 AM Encounter Date:  2017 Status:  Signed     :  Stacy De Leon            Patient referred by Dr. Kim for a EGD , diagnosis is  Esophageal dysphagia and RUQ abdominal pain.  Please advise.  Thank you.

## 2017-12-29 NOTE — PATIENT INSTRUCTIONS
Patient Information     Patient Name MRMadiha Cabrera [id number] Female 1940      Patient Instructions by Siddharth Kim MD at 2017  7:45 AM     Author:  Siddharth Kim MD Service:  (none) Author Type:  Physician     Filed:  2017  8:22 AM Encounter Date:  2017 Status:  Signed     :  Siddharth Kim MD (Physician)             -- Schedule EGD   -- Work on 5% weight loss   -- Consider discontinue trial of celebrex if you have another great year with weight loss    Your BMI is Body mass index is 32.19 kg/(m^2).  (BMI ranges: Normal 18.5 - 25, Overweight 25 - 30, Obesity greater than 30,     Morbid Obesity greater than 40 or greater than 35 with associated conditions.)    Facts about losing weight:   -- Overweight and Obesity increase your risk for developing diabetes, high blood pressure and stroke, and shorten your life.   -- 90% of weight loss comes from dietary changes, only 10% from exercise    What should I do?   -- Work on 5-10% weight loss   -- Focus on a few healthy dietary changes   -- Eat more fresh fruits and vegetables, and fewer carbohydrates   -- Cut out all calorie-containing beverages (milk, juice, alcohol, etc)   -- Exercise every day   -- Weigh yourself once a week   -- Consider the DASH Diet (http://bit.ly/DASHDiet - redirects to the NIH)   -- Consider Weight Watchers (http://www.weightwatchers.com)   -- Consider My Fitness Pal (iOS, Android, http://www.Jumpidopal.com)

## 2017-12-29 NOTE — PATIENT INSTRUCTIONS
Patient Information     Patient Name Madiha Thapa [id number] Female 1940      Patient Instructions by Siddharth Kim MD at 2017 11:30 AM     Author:  Siddharth Kim MD  Service:  (none) Author Type:  Physician     Filed:  2017 11:48 AM  Encounter Date:  2017 Status:  Addendum     :  Siddharth Kim MD (Physician)        Related Notes: Original Note by Siddharth Kim MD (Physician) filed at 2017 11:44 AM             -- Neti pot   -- Think about trying a daily loratadine   --  Use diphenhydramine 25 mg before bed as needed. Watch for constipation   -- Azithromycin   -- Eat yogurt 1-2 times per day while on antibiotics (and for a few weeks after) to reduce the chances of diarrhea   -- Honey for cough   -- Try tessalon for cough   -- Elevate head   -- HEPA filter   -- Follow-up if symptoms persist/worsen       Index Syriac Related topics   Allergies: Controlling Your Environment   ________________________________________________________________________  KEY POINTS    Pollen from trees and plants, mold, house dust, animal dander, cockroaches, cigarette smoke, perfume or other strong odors, smog, and cold air can cause or worsen allergy symptoms.    You can lessen your allergy symptoms if you limit your contact with things that cause your symptoms, especially in places where you spend a lot of time, such as at home, school, or your office.  ________________________________________________________________________  If you have allergies, many things inside and outside your home can cause or worsen symptoms. The things that cause symptoms are called triggers. Some common triggers in the environment are pollen from trees and plants, mold, house dust, animal dander, cockroaches, cigarette smoke, perfume or other strong odors, smog, and cold air.   You can lessen your allergy symptoms if you limit your contact with your triggers, especially in places where you spend a  lot of time, such as at home, school, or your office. Here are some things you can do.  Pollens   Pollens are small particles that plants such as trees, grasses, and weeds release into the air. The amount of pollen in the air outdoors varies with the season and the time of day. Pollen and outdoor mold amounts tend to be lower in the early morning and higher at midday and in the afternoon.  Pollens from grasses, weeds, and some trees can be carried through the air for miles. These pollens land in the eyes, nose, and airways, causing the symptoms of allergies. You may not be able to avoid all pollens, but it helps if you:    Keep doors and windows shut in the pollen season. Don t use window or attic fans because they blow the dust and pollen around. Use an air conditioner, if you have one, in your house and car. If a room air conditioner is used, recirculate the indoor air rather than pulling air in from outside. Wash or change air filters once a month. Doing these things can also help keep things like dust and smog out of your home.    Stay away from trees and grasses as much as you can in the pollen season and wear a hat to keep pollen from getting into your hair.    After being outside during allergy season, shower, wash your hair, and change your clothes right away. Do not keep the dirty clothes in bedrooms because there may be pollen on the clothes.    Dry your clothes in a dryer, not outside.    Find a reliable source for local pollen counts and, if possible, stay indoors on days when the pollen count is high.  Mold   Molds are found year-round throughout the house, outdoors, and in foods, but especially in areas of high moisture. Molds blow around in the air both outdoors and indoors. Bathrooms and damp basements are common areas for mold growth. Mold is also very likely to grow in swamp coolers, humidifiers, and the refrigerator drip pan and crisper. Here are some ways to decrease mold growth:    Fix all leaks  and clean the kitchen and bathroom tile, floors, shower curtain, and tub thoroughly and often. Also clean under the sink. Use a cleaning solution that kills molds. For example, you can use diluted household bleach (1 cup of bleach in 10 cups of water).    Use paint rather than wallpaper on your walls. Enamel paint stops mold growth better than latex paint. An antifungal substance can be added to paints to keep mold from growing.    It is best to keep the humidity in the house between 30% and 50%. Buy a dehumidifier to take moisture out of the air if you live in a humid climate. Dehumidifiers can help keep mold from growing in damp places like basements. Look for areas that get damp from hard rains and fix any leaks that you find.    Avoid evaporative coolers (also called swamp or desert coolers), vaporizers, and humidifiers with a reservoir, if you can, because they are ideal places for mold and bacteria to grow. When these appliances are operating, molds and bacteria can be sprayed throughout the house. If you do use one, empty the reservoir daily, clean it with soap and water, and dry it thoroughly. The reservoir should be refilled just before use.    Greenhouses, compost piles, and homes with many plants also frequently have molds. Cover the potting soil of houseplants with foil to reduce the spread of mold spores.    Remove any dry rot in wood frame housing.    Clean indoor trashcans with diluted bleach (1 part bleach to 10 parts water) and keep them dry.  Dust mites  Dust mites are very tiny, spiderlike bugs that you can only see with a microscope. Their body parts and droppings are what you breathe in and can be allergic to. Dust mites can be found in mattresses, pillows, carpet, upholstered furniture, bedding, clothes, and soft toys. They are much less of a problem at high altitudes (over 3000 feet above sea level) or in very dry climates unless you use a humidifier in your home.   It s not possible to get  rid of dust mites completely, but there are things you can do to help.    Avoid clutter and dust catchers, particularly in the bedroom. These include knickknacks, wall decorations (pictures, pennants, and fabric wall coverings), drapes, shades or blinds, stacks of books, and piles of papers or toys.    Keep the bedroom closet door closed. Store only in-season clothes in the closet.    Bare floors are best. You can replace carpet with washable, nonskid rugs. Damp mop the floors often. If you have carpet, vacuum often and thoroughly. Replace vacuum bags and change vacuum  filters often. Be sure to clean under the furniture and in the closet.    Mattresses should be in coverings that are allergen-proof, such as plastic. You can get allergen-proof coverings where bed linens are sold. Zippers or openings should be taped shut. Cover pillows with allergen-proof covers or wash the pillows each week in hot water. Also wash blankets, sheets, and pillowcases in very hot water (at least 130  F, or 54.4  C) every week. Cooler water used with detergent and bleach can also work. Be sure linens and pillows are completely dry before using them.    Forced-air furnaces should have a dust-filtering system. Filters should be changed as often as recommended by the . Filters can be cut to cover room vents if the central furnace filters are not changed often enough. Cold and warm air ducts should be professionally cleaned at least every 4 to 5 years.    Use an air  with a high-efficiency particulate air (HEPA) filter or an electrostatic filter.    Try not to sleep or lie on cloth-covered cushions or furniture.    Keep stuffed toys out of the bed, or wash the toys weekly in hot water or in cooler water with detergent and bleach.    If you usually get symptoms during housecleaning or yard work, wear a mask (available in drugstores) over your nose and mouth during these chores.  Pets   Pet allergens are found in  animal dander (dried skin flakes or dandruff), saliva, and urine. They cause allergic reactions in many people. You may be more sensitive to one type of animal (such as cats) than another. All furry and feathered animals can cause allergic reactions.   If you are sensitive to animals and have a pet, the pet should live outside or stay in just one part of the house and NEVER be in the bedroom. Have family members brush or comb pets outdoors. Wash your hands right after touching a pet.  Giving away a family pet is very hard, but if someone in your home is very sensitive, it may be necessary. Once the pet is gone, thoroughly clean the house. It is especially important to clean stuffed furniture, wall surfaces, carpets, rugs, drapes, and the heating/cooling system.   Cockroaches   Cockroaches and their droppings are a common cause of allergies. To get rid of cockroaches:    Keep food and garbage in containers with tight lids. Take garbage out daily before bedtime.    Never leave food out. Especially keep it out of bedrooms. Don t leave pet food or dirty food bowls out.    Vacuum or sweep the floor, wash the dishes, and wipe off countertops and the stove right after meals. Keep cupboards clean.    Fix water sources that attract these pests, such as leaky faucets and drain pipes.    Plug cracks around the house to help stop cockroaches from getting in.    Don t store paper bags, newspapers, or cardboard boxes.    Use bait stations and other environmentally safe vasquez poisons.  Smoke and strong smells  Avoid smoke from tobacco products, wood-burning fireplaces, and stoves. Stay away from areas with heavy smoke from grass or forest fires. If others want to smoke, they should smoke outside. No smoking should be allowed in the car or in the house.   Try not to breathe fumes from paint, insecticides, gasoline, household , or other products with strong smells.  Developed by [employer].  Adult Advisor 2016.3 published  by [employer].  Last modified: 2016-05-12  Last reviewed: 2014-12-31  This content is reviewed periodically and is subject to change as new health information becomes available. The information is intended to inform and educate and is not a replacement for medical evaluation, advice, diagnosis or treatment by a healthcare professional.  References   Adult Advisor 2016.3 Index    Copyright   2016 [employer], a division of [employer] All rights reserved.

## 2017-12-29 NOTE — H&P
Patient Information     Patient Name MRN Sex Madiha Ly [id number] Female 1940      H&P by Catherine Christy MD at 2017 10:57 AM     Author:  Catherine Christy MD Service:  (none) Author Type:  Physician     Filed:  2017 10:59 AM Date of Service:  2017 10:57 AM Status:  Signed     :  Catherine Christy MD (Physician)            CHIEF COMPLAINT / REASON FOR PROCEDURE:  Dysphagia and epigastric pain    PERTINENT HISTORY   Patient complains of food sticking and epigastric pain. Previous EGD years ago.     Past Medical History:     Diagnosis  Date     Bursitis of right hip      Chronic hepatitis (HC)     Chronic hepatitis, mild, stable      Endometrial carcinoma (HC)      Esophageal reflux      Hiatal hernia      Hypercholesterolemia      Hypertriglyceridemia      Hypothyroidism      Loose stools      Major depressive disorder, recurrent episode, in full remission (HC)      Multinodular goiter      Myxoma     Benign myxoma, right knee      Osteopenia      Peptic disease     Peptic acid disease, esophageal reflux      Prediabetes      Primary generalized hypertrophic osteoarthrosis      Seborrheic keratosis      Tear of meniscus of right knee     Torn medial and lateral meniscus right knee with popliteal cyst      Urinary frequency      Venous stasis      Wheezing     Wheezing with possible angina      Past Surgical History:      Procedure  Laterality Date     CHOLECYSTECTOMY       COLONOSCOPY  06    Colonoscopy normal, colonoscopy due in        COLONOSCOPY SCREENING      Colonoscopy - Next one due         COLONOSCOPY SCREENING  2016    wnl no need for fu       ESOPHAGOGASTRODUODENOSCOPY       HAND FINGER SURGERY      Reconstruct left first metacarpocarpal joint        HERNIA REPAIR      Periumbilical incisional hernia, repaired        HM DEXA SCAN      DXA normal       KNEE ARTHROSCOPY      Right knee surgery removal of myxoma       KNEE ARTHROSCOPY   1/16/01    Arthroscopy left knee and right knee.        KNEE REPLACEMENT  6/2011    right knee arthroplasty       KNEE REPLACEMENT  07/2011     left knee arthroplasty       LAPAROSCOPY ABDOMEN DIAGNOSTIC       GA COLONOSCOPY BIOPSY SINGLE OR MULTIPLE  7/31/2014            ILIANA AND BSO  1/21/97    ILIANA/BSO for endometrial carcinoma       ILIANA AND BSO  1999     THYROIDECTOMY  09/2004    Bilateral thyroidectomy for Hashimoto's thyroiditis       TUBAL LIGATION       Other:  None  Bleeding tendencies:  No    Relevant Family History:  None    Relevant Social History:  None    A relevant review of systems was performed and was negative. See HPI.    ALLERGIES/SENSITIVITIES:   Allergies      Allergen   Reactions     Ivp Dye [Diatrizoate Meglumine (Iv Contrast Dye)]  Hives     Penicillin G Benzathin,Procain  Nausea Only     Became faint      Sulfa (Sulfonamide Antibiotics)  Rash        CURRENT MEDICATIONS:    Current Facility-Administered Medications        Medication  Dose Route Frequency Last Rate     lactated Ringers infusion  25 mL/hr Intravenous continuous 25 mL/hr (07/27/17 1030)     sodium chloride 0.9% 5 mL syringe (NORMAL SALINE)  5 mL Intravenous Each Time PRN        Prior to Admission medications          Medication Sig Start Date End Date Taking? Last Dose Authorizing Provider   aspirin chewable 81 mg chewable tablet Take 81 mg by mouth once daily with a meal.    7/19/17 pm at 08 Reported, Patient   atorvastatin (LIPITOR) 40 mg tablet Take 1 tablet by mouth once daily. 6/22/17 7/26/2017 at 08 Siddharth Kim MD   celecoxib (CELEBREX) 200 mg capsule Take 1 capsule by mouth 2 times daily with meals. 6/22/17 7/26/2017 at 08 iSddharth Kim MD   clindamycin (CLEOCIN) 300 mg capsule TK 2 CS PO 1 HOUR PRIOR TO PROCEDURE 5/16/16   More than one month ago at Unknown time Reported, Patient   COQ10, UBIQUINOL, ORAL Take  by mouth.   Yes 7/19/17 pm at 08 Reported, Patient   DAILY MULTI-VITAMIN ORAL Take  by mouth.   Yes  7/20/17am at 08 Reported, Patient   fluocinonide 0.05% topical (LIDEX) 0.05 % cream Apply  topically to affected area(s) 2 times daily. 6/20/16   More than one month ago at Unknown time Siddharth Kim MD   FLUoxetine (PROZAC) 20 mg capsule Take 1 capsule by mouth every morning. 6/22/17 7/27/2017 at 07 Siddharth Kim MD   hydroCHLOROthiazide (HCTZ) 25 mg tablet Take 25 mg daily 6/22/17 7/26/2017 at 08 Siddharth Kim MD   levothyroxine (SYNTHROID) 150 mcg tablet Take 1 tablet by mouth once daily. 6/22/17 7/27/2017 at 07 Siddharth Kim MD   metoprolol (LOPRESSOR) 100 mg tablet Take 1 tablet by mouth 2 times daily. 6/22/17 7/27/2017 at 07 Siddharth Kim MD   oxybutynin (DITROPAN) 5 mg tablet Take 1 tablet by mouth once daily. 6/22/17 7/26/2017 at 21 Siddharth Kim MD   potassium chloride (POTASSIUM CHLORIDE) 10 mEq tablet Take 1 tablet by mouth once daily with a meal. 6/22/17 7/26/2017 at 21 Siddharth Kim MD       PRE-SEDATION ASSESSMENT:    Lung Exam:  Normal  Heart Exam:  Normal    Comment(s):      IMPRESSION:  dysphagia.    PLAN:  I discussed diagnostic EGD and possible dilation with the patient. Anesthesia requested due to: age more than 72.    Catherine Christy MD

## 2017-12-30 NOTE — NURSING NOTE
Patient Information     Patient Name MRN Madiha Ayon [id number] Female 1940      Nursing Note by Erica Sultana at 2017  7:45 AM     Author:  Erica Sultana Service:  (none) Author Type:  (none)     Filed:  2017  8:02 AM Encounter Date:  2017 Status:  Signed     :  Erica Sultana            Patient presents to clinic for annual PX and for medication review.  Pt is fasting today.  Erica Sultana LPN ....................  2017   7:47 AM

## 2017-12-30 NOTE — NURSING NOTE
Patient Information     Patient Name MRN Madiha Ayon [id number] Female 1940      Nursing Note by Erica Sultana at 2017 11:30 AM     Author:  Erica Sultana Service:  (none) Author Type:  (none)     Filed:  2017 11:35 AM Encounter Date:  2017 Status:  Signed     :  Erica Sultana            Patient presents to clinic for cough and congestion for over a week.  Erica Sultana LPN ....................  2017   11:27 AM

## 2018-01-03 NOTE — TELEPHONE ENCOUNTER
Patient Information     Patient Name MRN Madiha Ayon [id number] Female 1940      Telephone Encounter by Siddharth Kim MD at 3/22/2017 11:11 AM     Author:  Siddharth Kim MD Service:  (none) Author Type:  Physician     Filed:  3/22/2017 11:11 AM Encounter Date:  3/22/2017 Status:  Signed     :  Siddharth Kim MD (Physician)            Outside scan reviewed. Stable mammogram, recommended follow-up in 1 year.  OB/Gyn consult if she has any concerns.    Signed, Siddharth Kim MD  Internal Medicine & Pediatrics

## 2018-01-25 VITALS
SYSTOLIC BLOOD PRESSURE: 134 MMHG | BODY MASS INDEX: 32.27 KG/M2 | DIASTOLIC BLOOD PRESSURE: 86 MMHG | HEART RATE: 68 BPM | WEIGHT: 189 LBS | HEIGHT: 64 IN

## 2018-01-25 VITALS
WEIGHT: 184 LBS | HEIGHT: 64 IN | DIASTOLIC BLOOD PRESSURE: 82 MMHG | SYSTOLIC BLOOD PRESSURE: 124 MMHG | HEART RATE: 64 BPM | BODY MASS INDEX: 31.41 KG/M2 | TEMPERATURE: 98.1 F

## 2018-01-29 ENCOUNTER — DOCUMENTATION ONLY (OUTPATIENT)
Dept: FAMILY MEDICINE | Facility: OTHER | Age: 78
End: 2018-01-29

## 2018-01-29 PROBLEM — E89.0 POST-SURGICAL HYPOTHYROIDISM: Status: ACTIVE | Noted: 2018-01-29

## 2018-01-29 PROBLEM — K75.9 INFLAMMATORY LIVER DISEASE: Status: ACTIVE | Noted: 2018-01-29

## 2018-01-29 PROBLEM — M76.899 ENTHESOPATHY OF HIP REGION: Status: ACTIVE | Noted: 2018-01-29

## 2018-01-29 PROBLEM — H26.9 CATARACT: Status: ACTIVE | Noted: 2018-01-29

## 2018-01-29 PROBLEM — M71.21 POPLITEAL CYST, RIGHT: Status: ACTIVE | Noted: 2018-01-29

## 2018-01-29 PROBLEM — S83.289A TEAR OF LATERAL CARTILAGE OR MENISCUS OF KNEE, CURRENT: Status: ACTIVE | Noted: 2018-01-29

## 2018-01-29 PROBLEM — C54.9 MALIGNANT NEOPLASM OF CORPUS UTERI, EXCEPT ISTHMUS (H): Status: ACTIVE | Noted: 2018-01-29

## 2018-01-29 RX ORDER — ATORVASTATIN CALCIUM 40 MG/1
40 TABLET, FILM COATED ORAL DAILY
COMMUNITY
Start: 2017-06-22 | End: 2018-06-27

## 2018-01-29 RX ORDER — DIPHENOXYLATE HYDROCHLORIDE AND ATROPINE SULFATE 2.5; .025 MG/1; MG/1
TABLET ORAL
COMMUNITY

## 2018-01-29 RX ORDER — AZITHROMYCIN 250 MG/1
TABLET, FILM COATED ORAL
COMMUNITY
Start: 2017-08-14 | End: 2018-06-27

## 2018-01-29 RX ORDER — METOPROLOL TARTRATE 100 MG
100 TABLET ORAL 2 TIMES DAILY
COMMUNITY
Start: 2017-06-22 | End: 2018-06-27

## 2018-01-29 RX ORDER — HYDROCHLOROTHIAZIDE 25 MG/1
25 TABLET ORAL DAILY
COMMUNITY
Start: 2017-06-22 | End: 2018-06-27

## 2018-01-29 RX ORDER — CELECOXIB 200 MG/1
200 CAPSULE ORAL 2 TIMES DAILY WITH MEALS
COMMUNITY
Start: 2017-06-22 | End: 2018-06-27

## 2018-01-29 RX ORDER — CLINDAMYCIN HCL 300 MG
CAPSULE ORAL
COMMUNITY
Start: 2016-05-16 | End: 2024-01-02

## 2018-01-29 RX ORDER — OXYBUTYNIN CHLORIDE 5 MG/1
5 TABLET ORAL DAILY
COMMUNITY
Start: 2017-06-22 | End: 2018-06-27

## 2018-01-29 RX ORDER — ASPIRIN 81 MG/1
81 TABLET, CHEWABLE ORAL
COMMUNITY
End: 2022-02-24

## 2018-01-29 RX ORDER — FLUOCINONIDE 0.5 MG/G
CREAM TOPICAL
COMMUNITY
Start: 2016-06-20 | End: 2018-06-27

## 2018-01-29 RX ORDER — POTASSIUM CHLORIDE 750 MG/1
10 TABLET, EXTENDED RELEASE ORAL
COMMUNITY
Start: 2017-06-22 | End: 2018-04-07

## 2018-01-29 RX ORDER — BENZONATATE 100 MG/1
100 CAPSULE ORAL 3 TIMES DAILY PRN
COMMUNITY
Start: 2017-08-14 | End: 2020-06-29

## 2018-01-29 RX ORDER — SUCRALFATE 1 G/1
1 TABLET ORAL
COMMUNITY
Start: 2017-11-20 | End: 2018-06-27

## 2018-01-29 RX ORDER — LEVOTHYROXINE SODIUM 150 UG/1
150 TABLET ORAL DAILY
COMMUNITY
Start: 2017-06-22 | End: 2018-06-27

## 2018-02-04 ASSESSMENT — PATIENT HEALTH QUESTIONNAIRE - PHQ9
SUM OF ALL RESPONSES TO PHQ QUESTIONS 1-9: 0
SUM OF ALL RESPONSES TO PHQ QUESTIONS 1-9: 0

## 2018-04-07 DIAGNOSIS — T50.905A DRUG-INDUCED HYPOKALEMIA: Primary | ICD-10-CM

## 2018-04-07 DIAGNOSIS — E87.6 DRUG-INDUCED HYPOKALEMIA: Primary | ICD-10-CM

## 2018-04-10 RX ORDER — POTASSIUM CHLORIDE 750 MG/1
TABLET, EXTENDED RELEASE ORAL
Qty: 90 TABLET | Refills: 0 | Status: SHIPPED | OUTPATIENT
Start: 2018-04-10 | End: 2018-06-27

## 2018-04-10 NOTE — TELEPHONE ENCOUNTER
[facility] pharmacy and pt request a Rx refill for Klor-Con M10.  Potassium supplement protocol passed.  Pt's last exam appt with PCP Dr. JOSE ROBERTO Kim was on 8-14-17.  Prescription approved per [facility] Refill Protocol.  Joycelyn Doyle RN

## 2018-06-27 ENCOUNTER — TELEPHONE (OUTPATIENT)
Dept: PEDIATRICS | Facility: OTHER | Age: 78
End: 2018-06-27

## 2018-06-27 ENCOUNTER — OFFICE VISIT (OUTPATIENT)
Dept: PEDIATRICS | Facility: OTHER | Age: 78
End: 2018-06-27
Attending: INTERNAL MEDICINE
Payer: MEDICARE

## 2018-06-27 VITALS
HEART RATE: 62 BPM | SYSTOLIC BLOOD PRESSURE: 124 MMHG | HEIGHT: 64 IN | WEIGHT: 192.5 LBS | BODY MASS INDEX: 32.87 KG/M2 | DIASTOLIC BLOOD PRESSURE: 80 MMHG

## 2018-06-27 DIAGNOSIS — K29.60 GASTRITIS, BILE ACID REFLUX: ICD-10-CM

## 2018-06-27 DIAGNOSIS — N39.41 URGE INCONTINENCE: ICD-10-CM

## 2018-06-27 DIAGNOSIS — K44.9 HIATAL HERNIA: ICD-10-CM

## 2018-06-27 DIAGNOSIS — E66.09 NON MORBID OBESITY DUE TO EXCESS CALORIES: ICD-10-CM

## 2018-06-27 DIAGNOSIS — M25.511 RIGHT SHOULDER PAIN, UNSPECIFIED CHRONICITY: ICD-10-CM

## 2018-06-27 DIAGNOSIS — E89.0 POSTOPERATIVE HYPOTHYROIDISM: ICD-10-CM

## 2018-06-27 DIAGNOSIS — R21 RASH AND NONSPECIFIC SKIN ERUPTION: ICD-10-CM

## 2018-06-27 DIAGNOSIS — E03.9 ACQUIRED HYPOTHYROIDISM: Primary | ICD-10-CM

## 2018-06-27 DIAGNOSIS — E87.6 DRUG-INDUCED HYPOKALEMIA: ICD-10-CM

## 2018-06-27 DIAGNOSIS — K21.9 GASTROESOPHAGEAL REFLUX DISEASE, ESOPHAGITIS PRESENCE NOT SPECIFIED: ICD-10-CM

## 2018-06-27 DIAGNOSIS — M25.511 RIGHT SHOULDER PAIN: Primary | ICD-10-CM

## 2018-06-27 DIAGNOSIS — E78.2 MIXED HYPERLIPIDEMIA: ICD-10-CM

## 2018-06-27 DIAGNOSIS — R79.89 ELEVATED LFTS: ICD-10-CM

## 2018-06-27 DIAGNOSIS — R73.03 PREDIABETES: ICD-10-CM

## 2018-06-27 DIAGNOSIS — I10 ESSENTIAL HYPERTENSION: ICD-10-CM

## 2018-06-27 DIAGNOSIS — M15.0 PRIMARY OSTEOARTHRITIS INVOLVING MULTIPLE JOINTS: ICD-10-CM
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
## 2018-06-27 DIAGNOSIS — R13.10 DYSPHAGIA, UNSPECIFIED TYPE: ICD-10-CM

## 2018-06-27 DIAGNOSIS — F39 MOOD DISORDER (H): ICD-10-CM

## 2018-06-27 DIAGNOSIS — T50.905A DRUG-INDUCED HYPOKALEMIA: ICD-10-CM

## 2018-06-27 PROBLEM — M76.899 ENTHESOPATHY OF HIP REGION: Status: RESOLVED | Noted: 2018-01-29 | Resolved: 2018-06-27

## 2018-06-27 PROBLEM — K75.9 INFLAMMATORY LIVER DISEASE: Status: RESOLVED | Noted: 2018-01-29 | Resolved: 2018-06-27

## 2018-06-27 LAB
ALBUMIN SERPL-MCNC: 3.9 G/DL (ref 3.5–5.7)
ALP SERPL-CCNC: 86 U/L (ref 34–104)
ALT SERPL W P-5'-P-CCNC: 15 U/L (ref 7–52)
ANION GAP SERPL CALCULATED.3IONS-SCNC: 6 MMOL/L (ref 3–14)
AST SERPL W P-5'-P-CCNC: 21 U/L (ref 13–39)
BILIRUB SERPL-MCNC: 0.7 MG/DL (ref 0.3–1)
BUN SERPL-MCNC: 14 MG/DL (ref 7–25)
CALCIUM SERPL-MCNC: 9.1 MG/DL (ref 8.6–10.3)
CHLORIDE SERPL-SCNC: 101 MMOL/L (ref 98–107)
CHOLEST SERPL-MCNC: 191 MG/DL
CO2 SERPL-SCNC: 36 MMOL/L (ref 21–31)
CREAT SERPL-MCNC: 0.86 MG/DL (ref 0.6–1.2)
GFR SERPL CREATININE-BSD FRML MDRD: 64 ML/MIN/1.7M2
GLUCOSE SERPL-MCNC: 107 MG/DL (ref 70–105)
HBA1C MFR BLD: 5.9 % (ref 4–6)
HDLC SERPL-MCNC: 53 MG/DL (ref 23–92)
LDLC SERPL CALC-MCNC: 98 MG/DL
NONHDLC SERPL-MCNC: 138 MG/DL
POTASSIUM SERPL-SCNC: 3.8 MMOL/L (ref 3.5–5.1)
PROT SERPL-MCNC: 6.9 G/DL (ref 6.4–8.9)
SODIUM SERPL-SCNC: 143 MMOL/L (ref 134–144)
TRIGL SERPL-MCNC: 200 MG/DL
TSH SERPL DL<=0.05 MIU/L-ACNC: 36.42 IU/ML (ref 0.34–5.6)

## 2018-06-27 PROCEDURE — G0463 HOSPITAL OUTPT CLINIC VISIT: HCPCS

## 2018-06-27 PROCEDURE — 80053 COMPREHEN METABOLIC PANEL: CPT | Performed by: INTERNAL MEDICINE

## 2018-06-27 PROCEDURE — 80061 LIPID PANEL: CPT | Performed by: INTERNAL MEDICINE

## 2018-06-27 PROCEDURE — 36415 COLL VENOUS BLD VENIPUNCTURE: CPT | Performed by: INTERNAL MEDICINE

## 2018-06-27 PROCEDURE — 84443 ASSAY THYROID STIM HORMONE: CPT | Performed by: INTERNAL MEDICINE

## 2018-06-27 PROCEDURE — 99397 PER PM REEVAL EST PAT 65+ YR: CPT | Performed by: INTERNAL MEDICINE

## 2018-06-27 PROCEDURE — 83036 HEMOGLOBIN GLYCOSYLATED A1C: CPT | Performed by: INTERNAL MEDICINE

## 2018-06-27 RX ORDER — FLUOCINONIDE 0.5 MG/G
CREAM TOPICAL
Qty: 30 G | Refills: 11 | Status: SHIPPED | OUTPATIENT
Start: 2018-06-27 | End: 2020-08-13

## 2018-06-27 RX ORDER — POTASSIUM CHLORIDE 750 MG/1
10 TABLET, EXTENDED RELEASE ORAL DAILY
Qty: 90 TABLET | Refills: 4 | Status: SHIPPED | OUTPATIENT
Start: 2018-06-27 | End: 2019-05-24

## 2018-06-27 RX ORDER — OXYBUTYNIN CHLORIDE 5 MG/1
5 TABLET ORAL DAILY
Qty: 90 TABLET | Refills: 4 | Status: SHIPPED | OUTPATIENT
Start: 2018-06-27 | End: 2019-04-13

## 2018-06-27 RX ORDER — LEVOTHYROXINE SODIUM 150 UG/1
150 TABLET ORAL DAILY
Qty: 90 TABLET | Refills: 4 | Status: SHIPPED | OUTPATIENT
Start: 2018-06-27 | End: 2018-06-27

## 2018-06-27 RX ORDER — METOPROLOL TARTRATE 100 MG
100 TABLET ORAL 2 TIMES DAILY
Qty: 180 TABLET | Refills: 4 | Status: SHIPPED | OUTPATIENT
Start: 2018-06-27 | End: 2019-04-13

## 2018-06-27 RX ORDER — ATORVASTATIN CALCIUM 40 MG/1
40 TABLET, FILM COATED ORAL DAILY
Qty: 90 TABLET | Refills: 4 | Status: SHIPPED | OUTPATIENT
Start: 2018-06-27 | End: 2019-05-24

## 2018-06-27 RX ORDER — SUCRALFATE 1 G/1
1 TABLET ORAL
Qty: 120 TABLET | Refills: 11 | Status: SHIPPED | OUTPATIENT
Start: 2018-06-27 | End: 2019-06-27

## 2018-06-27 RX ORDER — HYDROCHLOROTHIAZIDE 25 MG/1
25 TABLET ORAL DAILY
Qty: 90 TABLET | Refills: 4 | Status: SHIPPED | OUTPATIENT
Start: 2018-06-27 | End: 2019-04-25

## 2018-06-27 RX ORDER — CELECOXIB 100 MG/1
200 CAPSULE ORAL DAILY
Qty: 180 CAPSULE | Refills: 4 | Status: SHIPPED | OUTPATIENT
Start: 2018-06-27 | End: 2019-05-24

## 2018-06-27 RX ORDER — LEVOTHYROXINE SODIUM 175 UG/1
175 TABLET ORAL DAILY
Qty: 90 TABLET | Refills: 1 | Status: SHIPPED | OUTPATIENT
Start: 2018-06-27 | End: 2018-08-30

## 2018-06-27 ASSESSMENT — ANXIETY QUESTIONNAIRES
5. BEING SO RESTLESS THAT IT IS HARD TO SIT STILL: NOT AT ALL
2. NOT BEING ABLE TO STOP OR CONTROL WORRYING: NOT AT ALL
6. BECOMING EASILY ANNOYED OR IRRITABLE: NOT AT ALL
IF YOU CHECKED OFF ANY PROBLEMS ON THIS QUESTIONNAIRE, HOW DIFFICULT HAVE THESE PROBLEMS MADE IT FOR YOU TO DO YOUR WORK, TAKE CARE OF THINGS AT HOME, OR GET ALONG WITH OTHER PEOPLE: NOT DIFFICULT AT ALL
7. FEELING AFRAID AS IF SOMETHING AWFUL MIGHT HAPPEN: NOT AT ALL
3. WORRYING TOO MUCH ABOUT DIFFERENT THINGS: NOT AT ALL
GAD7 TOTAL SCORE: 0
1. FEELING NERVOUS, ANXIOUS, OR ON EDGE: NOT AT ALL

## 2018-06-27 ASSESSMENT — PAIN SCALES - GENERAL: PAINLEVEL: NO PAIN (0)

## 2018-06-27 ASSESSMENT — PATIENT HEALTH QUESTIONNAIRE - PHQ9: 5. POOR APPETITE OR OVEREATING: NOT AT ALL

## 2018-06-27 NOTE — MR AVS SNAPSHOT
After Visit Summary   6/27/2018    Madiha Beltran    MRN: [id number]           Patient Information     Date Of Birth          1940        Visit Information        Provider Department      6/27/2018 8:00 AM Siddharth Kim MD [facility]        Today's Diagnoses     Fall, initial encounter    -  1    Drug-induced hypokalemia        Right shoulder pain, unspecified chronicity        Gastroesophageal reflux disease, esophagitis presence not specified        Mixed hyperlipidemia        Prediabetes        Elevated LFTs        Essential hypertension        Non morbid obesity due to excess calories        Urge incontinence        Rash and nonspecific skin eruption        Primary osteoarthritis involving multiple joints        Mood disorder (H)        Postoperative hypothyroidism        Dysphagia, unspecified type        Hiatal hernia        Gastritis, bile acid reflux          Care Instructions     -- Orthopedics consult for arm/shoulder pain   -- Consider PT referral   -- Referral back to Dr. Christy for swallowing difficulty   -- Consider GI consult        Hiatal Hernia  The diaphragm is a thin sheet of muscle that runs across the top of the stomach. The small opening in the diaphragm where the esophagus and stomach meet is called the hiatus. When you eat, the muscle around the hiatus relaxes to let food pass from the esophagus into the stomach. The hiatus tightens to keep food and acid in the stomach.  In some people, the hiatus is too large or the muscle around it is weak. Then the top of the stomach can push upward through the hiatus. This is called a hiatal hernia. A hiatal hernia can let stomach acid flow back up the esophagus. This is called acid reflux.  Hiatal hernias are common. The cause of a hiatal hernia is not certain, but some things may make it more likely. These may include obesity, pregnancy, and vomiting or coughing.  Many people with hiatal hernia do not  have symptoms. Often the symptoms are like those of GERD or acid reflux. They can include:    Burning feeling under the breastbone (heartburn)    Other chest discomfort    Frequent burping    Food coming back up into the throat or mouth    Acid taste in the mouth    Trouble swallowing food or liquid    Nighttime choking, coughing, or wheezing    Feeling full after eating only a small amount of food    Nausea and vomiting  Treatment can reduce symptoms. This includes medicines and lifestyle changes. In severe cases, you may need surgery to tighten the hiatus.  Home care  Medicines help control symptoms. They cannot fix the hernia.     Antacids help neutralize the normal acids in your stomach. You may find one works better than another for you.     Acid blockers (H2 blockers) decrease acid production.     Acid inhibitors (PPIs) decrease acid production in a different way than the blockers.     Don't take NSAIDs such as aspirin, ibuprofen, and naproxen. These may worsen symptoms in some people. If you are taking these medicines for another medical problem, talk to your healthcare provider before stopping them.  Lifestyle changes are important in treating your symptoms. You can help reduce or relieve your symptoms if you:    Stop smoking and using tobacco. Also if possible, avoid second-hand smoke.    Lose excess weight. Excess weight puts pressure on the stomach and esophagus.    Symptoms can be worsened by certain foods. Limit or avoid fatty, fried, and spicy foods, as well as coffee, chocolate, mint, and foods with high acid content such as tomatoes and citrus fruit and juices (orange, grapefruit, lemon).     Eat smaller amounts at a time. Avoid getting overfull.    Don't use alcohol, caffeine, or tobacco. They can delay healing and worsen your symptoms.  Follow-up care  Follow up with your healthcare provider. Regular visits may be needed to check on your health. Be sure to keep all your appointments. In some cases,  you may need surgery to stop symptoms. Your healthcare provider will talk with you about this option if needed.  When to seek medical advice  Call your healthcare provider right away if any of these occur:    Severe pain in your chest or abdomen    Can t keep down food or liquid    Symptoms get worse    Other symptoms as indicated by your healthcare provider  Call 911  Call 911 if any of these occur:    Trouble breathing or swallowing    Worsening chest pain, especially if different from usual    Vomiting blood    Large amounts of blood in stool  Date Last Reviewed: 6/24/2015 2000-2017 [employer]. [address] [place] [address]. All rights reserved. This information is not intended as a substitute for professional medical care. Always follow your healthcare professional's instructions.                Follow-ups after your visit        Additional Services     GENERAL SURG ADULT REFERRAL       Catherine Christy            ORTHOPEDICS ADULT REFERRAL       Gabe                  Follow-up notes from your care team     Return in about 1 year (around 6/27/2019) for medication management.      Who to contact     If you have questions or need follow up information about today's clinic visit or your schedule please contact [facility] directly at [phone].  Normal or non-critical lab and imaging results will be communicated to you by Currentlyhart, letter or phone within 4 business days after the clinic has received the results. If you do not hear from us within 7 days, please contact the clinic through Currentlyhart or phone. If you have a critical or abnormal lab result, we will notify you by phone as soon as possible.  Submit refill requests through [employer] or call your pharmacy and they will forward the refill request to us. Please allow 3 business days for your refill to be completed.          Additional Information About Your Visit        Currently[employer]zeo Information     [employer] gives you  "secure access to your electronic health record. If you see a primary care provider, you can also send messages to your care team and make appointments. If you have questions, please call your primary care clinic.  If you do not have a primary care provider, please call [phone] and they will assist you.        Care EveryWhere ID     This is your Care EveryWhere ID. This could be used by other organizations to access your [place] medical records  [id number]        Your Vitals Were     Pulse Height BMI (Body Mass Index)             62 5' 4.25\" (1.632 m) 32.79 kg/m2          Blood Pressure from Last 3 Encounters:   06/27/18 124/80   08/14/17 124/82   06/22/17 134/86    Weight from Last 3 Encounters:   06/27/18 192 lb 8 oz (87.3 kg)   08/14/17 184 lb (83.5 kg)   06/22/17 189 lb (85.7 kg)              We Performed the Following     Comprehensive metabolic panel     GENERAL SURG ADULT REFERRAL     Hemoglobin A1c     Lipid Profile     ORTHOPEDICS ADULT REFERRAL     Thyrotropin GH          Today's Medication Changes          These changes are accurate as of 6/27/18  8:58 AM.  If you have any questions, ask your nurse or doctor.               These medicines have changed or have updated prescriptions.        Dose/Directions    celecoxib 100 MG capsule   Commonly known as:  celeBREX   This may have changed:    - medication strength  - when to take this   Used for:  Primary osteoarthritis involving multiple joints   Changed by:  Siddharth Kim MD        Dose:  200 mg   Take 2 capsules (200 mg) by mouth daily   Quantity:  180 capsule   Refills:  4       fluocinonide 0.05 % cream   Commonly known as:  LIDEX   This may have changed:    - how to take this  - when to take this  - additional instructions   Used for:  Rash and nonspecific skin eruption   Changed by:  Siddharth Kim MD        Use daily as needed for rash.   Quantity:  30 g   Refills:  11       potassium chloride SA 10 MEQ CR tablet   Commonly " known as:  KLOR-CON   This may have changed:  See the new instructions.   Used for:  Drug-induced hypokalemia   Changed by:  Siddharth Kim MD        Dose:  10 mEq   Take 1 tablet (10 mEq) by mouth daily   Quantity:  90 tablet   Refills:  4            Where to get your medicines      These medications were sent to [facility] - [place], [place] - [address] [place] [id number] [facility] [address]     Phone:  [phone]     atorvastatin 40 MG tablet    celecoxib 100 MG capsule    fluocinonide 0.05 % cream    FLUoxetine 20 MG capsule    hydrochlorothiazide 25 MG tablet    levothyroxine 150 MCG tablet    metoprolol tartrate 100 MG tablet    oxybutynin 5 MG tablet    potassium chloride SA 10 MEQ CR tablet    sucralfate 1 GM tablet                Primary Care Provider Office Phone # Fax #    Siddharth Kim [phone] [address] [facility] [address]        Equal Access to Services     [facility]: Hadii aad ku hadasho Soomaali, waaxda luqadaha, qaybta kaalmada adeegyada, waxay idiin hayaan april laguerre . So [facility] [phone].    ATENCIÓN: Si habla español, tiene a hernandez disposición servicios gratuitos de asistencia lingüística. [facility] [phone].    We comply with applicable federal civil rights laws and [place] laws. We do not discriminate on the basis of race, color, national origin, age, disability, sex, sexual orientation, or gender identity.            Thank you!     Thank you for choosing [facility]  for your care. Our goal is always to provide you with excellent care. Hearing back from our patients is one way we can continue to improve our services. Please take a few minutes to complete the written survey that you may receive in the mail after your visit with us. Thank you!             Your Updated Medication List - Protect others around you: Learn how to safely  use, store and throw away your medicines at www.disposemymeds.org.          This list is accurate as of 6/27/18  8:58 AM.  Always use your most recent med list.                   Brand Name Dispense Instructions for use Diagnosis    aspirin 81 MG chewable tablet      Take 81 mg by mouth daily with food        atorvastatin 40 MG tablet    LIPITOR    90 tablet    Take 1 tablet (40 mg) by mouth daily    Mixed hyperlipidemia       benzonatate 100 MG capsule    TESSALON     Take 100 mg by mouth 3 times daily as needed        celecoxib 100 MG capsule    celeBREX    180 capsule    Take 2 capsules (200 mg) by mouth daily    Primary osteoarthritis involving multiple joints       clindamycin 300 MG capsule    CLEOCIN     TK 2 CS PO 1 HOUR PRIOR TO PROCEDURE        fluocinonide 0.05 % cream    LIDEX    30 g    Use daily as needed for rash.    Rash and nonspecific skin eruption       FLUoxetine 20 MG capsule    PROzac    90 capsule    Take 1 capsule (20 mg) by mouth every morning    Mood disorder (H)       hydrochlorothiazide 25 MG tablet    HYDRODIURIL    90 tablet    Take 1 tablet (25 mg) by mouth daily    Essential hypertension       levothyroxine 150 MCG tablet    SYNTHROID/LEVOTHROID    90 tablet    Take 1 tablet (150 mcg) by mouth daily    Postoperative hypothyroidism       metoprolol tartrate 100 MG tablet    LOPRESSOR    180 tablet    Take 1 tablet (100 mg) by mouth 2 times daily    Essential hypertension       MULTI-VITAMINS Tabs           oxybutynin 5 MG tablet    DITROPAN    90 tablet    Take 1 tablet (5 mg) by mouth daily    Urge incontinence       potassium chloride SA 10 MEQ CR tablet    KLOR-CON    90 tablet    Take 1 tablet (10 mEq) by mouth daily    Drug-induced hypokalemia       sucralfate 1 GM tablet    CARAFATE    120 tablet    Take 1 tablet (1 g) by mouth 3 times daily (before meals)    Gastroesophageal reflux disease, esophagitis presence not specified       Ubiquinol 100 MG Caps

## 2018-06-27 NOTE — NURSING NOTE
Patient presents to clinic for yearly medication check and annual PX.  Erica Sultana LPN ....................  6/27/2018   8:03 AM

## 2018-06-27 NOTE — TELEPHONE ENCOUNTER
I called and spoke with Ms. Beltran. TSH has risen.      ICD-10-CM    1. Acquired hypothyroidism E03.9 levothyroxine (SYNTHROID/LEVOTHROID) 175 MCG tablet     Thyrotropin GH      Orders Placed This Encounter   Medications     levothyroxine (SYNTHROID/LEVOTHROID) 175 MCG tablet     Sig: Take 1 tablet (175 mcg) by mouth daily     Dispense:  90 tablet     Refill:  1     Orders Placed This Encounter   Procedures     Thyrotropin GH      -- Increase levothyroxine to 175 mcg   -- Lab only in 6 weeks for TSH    Signed, Siddharth Kim MD  Internal Medicine & Pediatrics

## 2018-06-27 NOTE — PROGRESS NOTES
Subjective  Madiha Beltran is a 78 year old female who presents for yearly physical.  She also has several questions.  She fell about a month ago.  [place] and hitting her shins bilaterally.  She had significant bruising which has nearly resolved.  She continues to have discomfort there and she wonders if this is normal.  She thinks she might have carpal tunnel syndrome.  She has pain in the wrists which radiates up to the elbow and shoulders.  Seems to worsen at nighttime, with using the computer.  Nothing seems to make it better.  She had testing in the past for carpal tunnel which was negative.  She continues to have ongoing swallowing difficulty.  She will eat about half her meal and then feel like she is full.  Sometimes she gets nauseated and will throw up.  She saw Dr. Christy for endoscopy last year and was started on Carafate.  She does not really think it is helping that much.  No chest pains with exertion.  No palpitations.  No dysuria.  No constipation.  She has a history of postsurgical hypothyroidism and continues on levothyroxine.  She continues on aspirin prophylaxis.  History of hyperlipidemia which is stable on Lipitor.  History of osteoarthritis and continues on Celebrex.  Currently taking 200 mg daily.  History of mood disorder which has been stable many years on Prozac.  History of hypertension which is stable on metoprolol, hydrochlorothiazide.  Has hypokalemia caused by hydrochlorothiazide which stabilized with potassium supplementation.  History of overactive bladder which has been stable on oxybutynin.    Problem List/PMH: reviewed in EMR, and made relevant updates today.  Medications: reviewed in EMR, and made relevant updates today.  Allergies: reviewed in EMR, and made relevant updates today.    Social Hx:  Social History   Substance Use Topics     Smoking status: Never Smoker     Smokeless tobacco: Never Used     Alcohol  "use No     Social History     Social History Narrative    , lives with  Janessa.   Moved into the apartments on [facility] in [place]     I reviewed social history and made relevant updates today.    Family Hx:   Family History   Problem Relation Age of Onset     Hypertension Mother      Hypertension     HEART DISEASE Mother      Heart Disease,CHF     Hypertension Father      Hypertension     HEART DISEASE Father      Heart Disease,CHF     Other - See Comments Maternal Grandmother      Stroke     Hypertension Maternal Grandmother      Hypertension     Other - See Comments Maternal Grandfather      Stroke     Family History Negative Maternal Grandfather      Good Health,Emphysema     Breast Cancer Other 39     Cancer-breast,fast growing     Diabetes Brother      Diabetes     Family History Negative Sister      Good Health     Arthritis Brother      Arthritis,psoriatic arthritis     Other - See Comments Brother       from burst blood vessel, pneumonia on Enbrel     Breast Cancer Other      Cancer-breast,couple of aunts, youngest at mid 40s     Asthma No family hx of      Asthma     Rhinitis No family hx of      Allergic rhinitis       Objective  Vitals: reviewed in EMR.  /80 (BP Location: Right arm, Patient Position: Sitting, Cuff Size: Adult Large)  Pulse 62  Ht 5' 4.25\" (1.632 m)  Wt 192 lb 8 oz (87.3 kg)  BMI 32.79 kg/m2    Gen: Pleasant female, NAD.  HEENT: MMM, no OP erythema.   Neck: Supple, no JVD, no bruits.  CV: RRR no m/r/g.   Pulm: CTAB no w/r/r  GI: Soft, NT, ND. No HSM. No masses. Bowel sounds present.  Neuro: Grossly intact.  Upper extremity strength 5/5 bilaterally.  Msk: No lower extremity edema.  Slight bruising anterior shins bilaterally.  Negative Neer and Moncada right shoulder.  Negative can test bilaterally.  Skin: No concerning lesions.  Psychiatric: Normal affect and insight. Does not appear anxious or depressed.    Labs:  Lab Results   Component Value " Date    HGB 15.2 06/22/2017    HCT 43.4 06/22/2017     06/22/2017    TRIG 204 (H) 06/22/2017    HDL 43 06/22/2017     06/22/2017    BUN 15 06/22/2017    CO2 30 06/22/2017         Assessment    ICD-10-CM    1. Fall, initial encounter W19.XXXA    2. Drug-induced hypokalemia E87.6 potassium chloride SA (KLOR-CON) 10 MEQ CR tablet    T50.905A    3. Right shoulder pain, unspecified chronicity M25.511 ORTHOPEDICS ADULT REFERRAL   4. Gastroesophageal reflux disease, esophagitis presence not specified K21.9 sucralfate (CARAFATE) 1 GM tablet   5. Mixed hyperlipidemia E78.2 atorvastatin (LIPITOR) 40 MG tablet     Lipid Profile   6. Prediabetes R73.03 Hemoglobin A1c     Comprehensive metabolic panel   7. Elevated LFTs R79.89 Comprehensive metabolic panel   8. Essential hypertension I10 hydrochlorothiazide (HYDRODIURIL) 25 MG tablet     metoprolol tartrate (LOPRESSOR) 100 MG tablet     Comprehensive metabolic panel   9. Non morbid obesity due to excess calories E66.09    10. Urge incontinence N39.41 oxybutynin (DITROPAN) 5 MG tablet   11. Rash and nonspecific skin eruption R21 fluocinonide (LIDEX) 0.05 % cream   12. Primary osteoarthritis involving multiple joints M15.0 celecoxib (CELEBREX) 100 MG capsule   13. Mood disorder (H) F39 FLUoxetine (PROZAC) 20 MG capsule   14. Postoperative hypothyroidism E89.0 levothyroxine (SYNTHROID/LEVOTHROID) 150 MCG tablet     Thyrotropin GH   15. Dysphagia, unspecified type R13.10 GENERAL SURG ADULT REFERRAL   16. Hiatal hernia K44.9 GENERAL SURG ADULT REFERRAL   17. Gastritis, bile acid reflux K29.60 GENERAL SURG ADULT REFERRAL     Orders Placed This Encounter   Procedures     Hemoglobin A1c     Comprehensive metabolic panel     Lipid Profile     Thyrotropin GH     ORTHOPEDICS ADULT REFERRAL     GENERAL SURG ADULT REFERRAL       Mammogram up-to-date, due this fall.  Colonoscopy 2016, likely last one.  Status post nephrectomy due to cancer.  With regards to her swallowing  difficulties I had like her to see Dr. Christy again to see if other treatment options are available.  I think it is likely multifactorial including hiatal hernia, bile acid reflux, obesity, etc.  No stricture was seen at that time.  With regards to her shoulder and arm pain differential diagnosis includes cervical radiculopathy, rotator cuff tendinitis, cubital tunnel syndrome, carpal tunnel syndrome, others.  I believe it is likely multifactorial from rotator cuff tendinopathy and some arthritis in the neck with cervical radiculopathy.  We discussed options and decided to start with orthopedics consultation.    Plan   -- Expected clinical course discussed   -- Medications and their side effects discussed  Patient Instructions    -- Orthopedics consult for arm/shoulder pain   -- Consider PT referral   -- Referral back to Dr. Christy for swallowing difficulty   -- Consider GI consult        Hiatal Hernia  The diaphragm is a thin sheet of muscle that runs across the top of the stomach. The small opening in the diaphragm where the esophagus and stomach meet is called the hiatus. When you eat, the muscle around the hiatus relaxes to let food pass from the esophagus into the stomach. The hiatus tightens to keep food and acid in the stomach.  In some people, the hiatus is too large or the muscle around it is weak. Then the top of the stomach can push upward through the hiatus. This is called a hiatal hernia. A hiatal hernia can let stomach acid flow back up the esophagus. This is called acid reflux.  Hiatal hernias are common. The cause of a hiatal hernia is not certain, but some things may make it more likely. These may include obesity, pregnancy, and vomiting or coughing.  Many people with hiatal hernia do not have symptoms. Often the symptoms are like those of GERD or acid reflux. They can include:    Burning feeling under the breastbone (heartburn)    Other chest discomfort    Frequent burping    Food coming back up into  the throat or mouth    Acid taste in the mouth    Trouble swallowing food or liquid    Nighttime choking, coughing, or wheezing    Feeling full after eating only a small amount of food    Nausea and vomiting  Treatment can reduce symptoms. This includes medicines and lifestyle changes. In severe cases, you may need surgery to tighten the hiatus.  Home care  Medicines help control symptoms. They cannot fix the hernia.     Antacids help neutralize the normal acids in your stomach. You may find one works better than another for you.     Acid blockers (H2 blockers) decrease acid production.     Acid inhibitors (PPIs) decrease acid production in a different way than the blockers.     Don't take NSAIDs such as aspirin, ibuprofen, and naproxen. These may worsen symptoms in some people. If you are taking these medicines for another medical problem, talk to your healthcare provider before stopping them.  Lifestyle changes are important in treating your symptoms. You can help reduce or relieve your symptoms if you:    Stop smoking and using tobacco. Also if possible, avoid second-hand smoke.    Lose excess weight. Excess weight puts pressure on the stomach and esophagus.    Symptoms can be worsened by certain foods. Limit or avoid fatty, fried, and spicy foods, as well as coffee, chocolate, mint, and foods with high acid content such as tomatoes and citrus fruit and juices (orange, grapefruit, lemon).     Eat smaller amounts at a time. Avoid getting overfull.    Don't use alcohol, caffeine, or tobacco. They can delay healing and worsen your symptoms.  Follow-up care  Follow up with your healthcare provider. Regular visits may be needed to check on your health. Be sure to keep all your appointments. In some cases, you may need surgery to stop symptoms. Your healthcare provider will talk with you about this option if needed.  When to seek medical advice  Call your healthcare provider right away if any of these occur:    Severe  pain in your chest or abdomen    Can t keep down food or liquid    Symptoms get worse    Other symptoms as indicated by your healthcare provider  Call 911  Call 911 if any of these occur:    Trouble breathing or swallowing    Worsening chest pain, especially if different from usual    Vomiting blood    Large amounts of blood in stool  Date Last Reviewed: 6/24/2015 2000-2017 [employer]. [address] [place] [address]. All rights reserved. This information is not intended as a substitute for professional medical care. Always follow your healthcare professional's instructions.            Return in about 1 year (around 6/27/2019) for medication management.    Signed, Siddharth Kim MD  Internal Medicine & Pediatrics

## 2018-06-27 NOTE — PATIENT INSTRUCTIONS
-- Orthopedics consult for arm/shoulder pain   -- Consider PT referral   -- Referral back to Dr. Christy for swallowing difficulty   -- Consider GI consult        Hiatal Hernia  The diaphragm is a thin sheet of muscle that runs across the top of the stomach. The small opening in the diaphragm where the esophagus and stomach meet is called the hiatus. When you eat, the muscle around the hiatus relaxes to let food pass from the esophagus into the stomach. The hiatus tightens to keep food and acid in the stomach.  In some people, the hiatus is too large or the muscle around it is weak. Then the top of the stomach can push upward through the hiatus. This is called a hiatal hernia. A hiatal hernia can let stomach acid flow back up the esophagus. This is called acid reflux.  Hiatal hernias are common. The cause of a hiatal hernia is not certain, but some things may make it more likely. These may include obesity, pregnancy, and vomiting or coughing.  Many people with hiatal hernia do not have symptoms. Often the symptoms are like those of GERD or acid reflux. They can include:    Burning feeling under the breastbone (heartburn)    Other chest discomfort    Frequent burping    Food coming back up into the throat or mouth    Acid taste in the mouth    Trouble swallowing food or liquid    Nighttime choking, coughing, or wheezing    Feeling full after eating only a small amount of food    Nausea and vomiting  Treatment can reduce symptoms. This includes medicines and lifestyle changes. In severe cases, you may need surgery to tighten the hiatus.  Home care  Medicines help control symptoms. They cannot fix the hernia.     Antacids help neutralize the normal acids in your stomach. You may find one works better than another for you.     Acid blockers (H2 blockers) decrease acid production.     Acid inhibitors (PPIs) decrease acid production in a different way than the blockers.     Don't take NSAIDs such as aspirin, ibuprofen, and  naproxen. These may worsen symptoms in some people. If you are taking these medicines for another medical problem, talk to your healthcare provider before stopping them.  Lifestyle changes are important in treating your symptoms. You can help reduce or relieve your symptoms if you:    Stop smoking and using tobacco. Also if possible, avoid second-hand smoke.    Lose excess weight. Excess weight puts pressure on the stomach and esophagus.    Symptoms can be worsened by certain foods. Limit or avoid fatty, fried, and spicy foods, as well as coffee, chocolate, mint, and foods with high acid content such as tomatoes and citrus fruit and juices (orange, grapefruit, lemon).     Eat smaller amounts at a time. Avoid getting overfull.    Don't use alcohol, caffeine, or tobacco. They can delay healing and worsen your symptoms.  Follow-up care  Follow up with your healthcare provider. Regular visits may be needed to check on your health. Be sure to keep all your appointments. In some cases, you may need surgery to stop symptoms. Your healthcare provider will talk with you about this option if needed.  When to seek medical advice  Call your healthcare provider right away if any of these occur:    Severe pain in your chest or abdomen    Can t keep down food or liquid    Symptoms get worse    Other symptoms as indicated by your healthcare provider  Call 911  Call 911 if any of these occur:    Trouble breathing or swallowing    Worsening chest pain, especially if different from usual    Vomiting blood    Large amounts of blood in stool  Date Last Reviewed: 6/24/2015 2000-2017 [employer]. [address] [place] [address], [place] [address]. All rights reserved. This information is not intended as a substitute for professional medical care. Always follow your healthcare professional's instructions.

## 2018-06-28 ASSESSMENT — PATIENT HEALTH QUESTIONNAIRE - PHQ9: SUM OF ALL RESPONSES TO PHQ QUESTIONS 1-9: 0

## 2018-06-28 ASSESSMENT — ANXIETY QUESTIONNAIRES: GAD7 TOTAL SCORE: 0

## 2018-07-06 ENCOUNTER — OFFICE VISIT (OUTPATIENT)
Dept: SURGERY | Facility: OTHER | Age: 78
End: 2018-07-06
Attending: INTERNAL MEDICINE
Payer: COMMERCIAL

## 2018-07-06 VITALS
SYSTOLIC BLOOD PRESSURE: 110 MMHG | BODY MASS INDEX: 32.95 KG/M2 | HEART RATE: 60 BPM | HEIGHT: 64 IN | TEMPERATURE: 96.6 F | WEIGHT: 193 LBS | DIASTOLIC BLOOD PRESSURE: 80 MMHG

## 2018-07-06 DIAGNOSIS — K44.9 HIATAL HERNIA: ICD-10-CM

## 2018-07-06 DIAGNOSIS — K29.60 GASTRITIS, BILE ACID REFLUX: ICD-10-CM

## 2018-07-06 DIAGNOSIS — R13.10 DYSPHAGIA, UNSPECIFIED TYPE: Primary | ICD-10-CM

## 2018-07-06 PROCEDURE — 99215 OFFICE O/P EST HI 40 MIN: CPT | Performed by: SURGERY

## 2018-07-06 PROCEDURE — G0463 HOSPITAL OUTPT CLINIC VISIT: HCPCS

## 2018-07-06 ASSESSMENT — PAIN SCALES - GENERAL: PAINLEVEL: NO PAIN (0)

## 2018-07-06 NOTE — PROGRESS NOTES
OFFICECONSULTATION NOTE  Patient Name: Madiha Beltran  Address: [address] [facility] [address] [place] [id number]  Age:78 year old  Sex: female     Primary Care Physician: Siddharth Kim MD    I was requested to see this patient in consultation by Siddharth Kim MD   for evaluation of ongoing dysphagia and epigastric abdominal pain. A copy of this note will be sent to Siddharth Kim MD.    HPI:   The patient is 78 year old female with complaints of episodic dysphagia. She has problems with food going all the way down. It feels like the food sticks and then after a few more bites, she vomits undigested food. The sticking sensation is at the bottom of her ribs. She has intermittent epigastic pain as well. That pain radiates into her back at times. She is taking carafate. She had an EGD less than a year ago showing bile reflux, no Barretts and no stricture or esophagitis. This has been an ongoing problem for her for years. She has tried ppi in the past as well without improvement. The patient denies nausea, constipation and diarrhea. She hasn't had black stools or blood in her bowel movements.  No problems with urinating. Previous testing: egd 7/2017.    CONSULTATION ASSESSMENT AND PLAN/RECOMMENDATIONS:   I explained to the patient that the lining of her esophagus looked pretty good last year and that I recommend an esophagram to evaluate how the esophagus is working. I also recommended starting ranitidine to see if that helps the occasional pain. The patient's questions were answered and the patient wished to proceed. This will be scheduled at a time that is convenient for the patient. She will follow up after the study to review results.     REVIEW OF SYSTEMS  GENERAL: No fevers or chills. Denies fatigue, recent weight loss.  HEENT: No sinus drainage. No changes with vision or hearing.   LYMPHATICS:  No swollen nodes in axilla, neck or groin.  CARDIOVASCULAR: Denies chest pain,  palpitations and dyspnea on exertion.  PULMONARY: No shortness of breath or cough. No increase in sputum production.  GI: Denies melena, bright red blood in stools. No hematemesis. No constipation or diarrhea.  : No dysuria orhematuria.  SKIN: No recent rashes or ulcers.   HEMATOLOGY:  No history of easy bruising or bleeding.  ENDOCRINE:  No history of diabetes, has thyroid problems.  NEUROLOGY:  No history of seizures or headaches. No motor or sensory changes.  PAST MEDICAL HISTORY  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, unspecified     Benign myxoma, right knee     Chronic hepatitis (H)     Chronic hepatitis, mild, stable     Diaphragmatic hernia without obstruction or gangrene     No Comments Provided     Disease of stomach and duodenum     Peptic acid disease, esophageal reflux     Frequency of micturition     No Comments Provided     Gastro-esophageal reflux disease without esophagitis     No Comments Provided     Hypothyroidism     No Comments Provided     Major depressive disorder, recurrent, in full remission (H)     No Comments Provided     Malignant neoplasm of endometrium (H)     No Comments Provided     Nontoxic multinodular goiter     No Comments Provided     Other bursitis of hip, right hip     No Comments Provided     Other fecal abnormalities     No Comments Provided     Other seborrheic keratosis     No Comments Provided     Other specified disorders of bone density and structure, unspecified site (CODE)     No Comments Provided     Other specified disorders of veins     No Comments Provided     Prediabetes     No Comments Provided     Primary generalized (osteo)arthritis     No Comments Provided     Pure hypercholesterolemia     No Comments Provided     Pure hyperglyceridemia     No Comments Provided     Tear of right meniscus as current injury     Torn medial and lateral meniscus right knee with popliteal cyst     Wheezing     Wheezing  with possible angina     PAST SURGICAL HISTORY  Past Surgical History:   Procedure Laterality Date     ARTHROPLASTY KNEE      6/2011,right knee arthroplasty     ARTHROPLASTY KNEE      07/2011, left knee arthroplasty     ARTHROSCOPY KNEE      Right knee surgery removal of myxoma     ARTHROSCOPY KNEE      1/16/01,Arthroscopy left knee and right knee.     CHOLECYSTECTOMY      No Comments Provided     COLONOSCOPY      1997,Colonoscopy - Next one due 01/07     COLONOSCOPY      7/31/06,Colonoscopy normal, colonoscopy due in 2016     COLONOSCOPY      8/4/2016,wnl no need for fu     ESOPHAGOSCOPY, GASTROSCOPY, DUODENOSCOPY (EGD), COMBINED      No Comments Provided     ESOPHAGOSCOPY, GASTROSCOPY, DUODENOSCOPY (EGD), COMBINED      7/27/2017     FINGER SURGERY      05/07,Reconstruct left first metacarpocarpal joint     HYSTERECTOMY TOTAL ABDOMINAL, BILATERAL SALPINGO-OOPHORECTOMY, COMBINED      1/21/97,ILIANA/BSO for endometrial carcinoma     HYSTERECTOMY TOTAL ABDOMINAL, BILATERAL SALPINGO-OOPHORECTOMY, COMBINED      1999     LAPAROSCOPIC TUBAL LIGATION      No Comments Provided     LAPAROSCOPY DIAGNOSTIC (GENERAL)      No Comments Provided     OTHER SURGICAL HISTORY      06/08,,HERNIA REPAIR,Periumbilical incisional hernia, repaired     OTHER SURGICAL HISTORY      06/03,024672,HM DEXA SCAN,DXA normal     OTHER SURGICAL HISTORY      7/31/2014,40245.0,ND COLONOSCOPY BIOPSY SINGLE OR MULTIPLE     THYROIDECTOMY      09/2004,Bilateral thyroidectomy for Hashimoto's thyroiditis     CURRENT MEDS  Current Outpatient Prescriptions   Medication     aspirin 81 MG chewable tablet     atorvastatin (LIPITOR) 40 MG tablet     benzonatate (TESSALON) 100 MG capsule     celecoxib (CELEBREX) 100 MG capsule     clindamycin (CLEOCIN) 300 MG capsule     fluocinonide (LIDEX) 0.05 % cream     FLUoxetine (PROZAC) 20 MG capsule     hydrochlorothiazide (HYDRODIURIL) 25 MG tablet     levothyroxine (SYNTHROID/LEVOTHROID) 175 MCG tablet     metoprolol  tartrate (LOPRESSOR) 100 MG tablet     Multiple Vitamin (MULTI-VITAMINS) TABS     oxybutynin (DITROPAN) 5 MG tablet     potassium chloride SA (KLOR-CON) 10 MEQ CR tablet     sucralfate (CARAFATE) 1 GM tablet     Ubiquinol 100 MG CAPS     No current facility-administered medications for this visit.      ALLERGIES/SENSITIVITIES  Allergies   Allergen Reactions     Bicillin C-R, Nausea     Became faint     Diatrizoate Hives     Sulfa Drugs Rash     FAMILY HISTORY  Family History   Problem Relation Age of Onset     Hypertension Mother      Hypertension     HEART DISEASE Mother      Heart Disease,CHF     Hypertension Father      Hypertension     HEART DISEASE Father      Heart Disease,CHF     Other - See Comments Maternal Grandmother      Stroke     Hypertension Maternal Grandmother      Hypertension     Other - See Comments Maternal Grandfather      Stroke     Family History Negative Maternal Grandfather      Good Health,Emphysema     Breast Cancer Other 37     Cancer-breast,fast growing     Diabetes Brother      Diabetes     Family History Negative Sister      Good Health     Arthritis Brother      Arthritis,psoriatic arthritis     Other - See Comments Brother       from burst blood vessel, pneumonia on Enbrel     Breast Cancer Other      Cancer-breast,couple of aunts, youngest at mid 40s     Asthma No family hx of      Asthma     Rhinitis No family hx of      Allergic rhinitis     SOCIALHISTORY  Social History     Social History     Marital status:      Spouse name: N/A     Number of children: N/A     Years of education: N/A     Social History Main Topics     Smoking status: Never Smoker     Smokeless tobacco: Never Used     Alcohol use No     Drug use: None      Comment: Drug use: Not Asked     Sexual activity: Not Asked     Other Topics Concern     None     Social History Narrative    , lives with  Janessa.   Moved into the apartments on [facility]      The above history  "was reviewed today 7/6/2018    PHYSICAL EXAM  Vitals: /80 (BP Location: Right arm, Patient Position: Sitting, Cuff Size: Adult Large)  Pulse 60  Temp 96.6  F (35.9  C)  Ht 5' 4.25\" (1.632 m)  Wt 193 lb (87.5 kg)  BMI 32.87 kg/m2    GENERAL: Healthy appearing patient in no acute distress. Pleasant and cooperative with exam and interview.   HEENT:Head-normocephalic. Eyes-no scleral icterus, pupils equal, round, and reactive to light. Nose-no nasal drainage. No lesions. Mouth-oral mucosa pink and moist, no lesions.  NECK: Supple. No thyroid nodules. Tracheamidline.  LYMPHATICS:  No cervical, axillary or supraclavicular adenopathy.  CV: Regular rate and rhythm, no murmurs. No peripheral edema.  LUNGS:  No respiratory distress. Clear bilaterally to auscultation.  ABDOMEN: Non distended. Bowel sounds active. Soft, mild epigastric tenderness, no hepatosplenomegaly or umbilical hernia. No peritoneal signs.  SKIN: Pink, warm and dry. No jaundice. No rash.  NEURO:  Cranial nerves II-XII grossly intact. Alert and oriented.  PSYCH: Appropriate mood and affect.    I reviewed the patient's recent EGD photos and pathology . Pertinent findings: small hiatal hernia, no stricture, no Sherwood's changes    "

## 2018-07-06 NOTE — MR AVS SNAPSHOT
After Visit Summary   7/6/2018    Madiha Beltran    MRN: [id number]           Patient Information     Date Of Birth          1940        Visit Information        Provider Department      7/6/2018 8:50 AM Catherine Christy MD [facility]        Today's Diagnoses     Dysphagia, unspecified type    -  1    Gastritis, bile acid reflux        Hiatal hernia          Care Instructions    Start the Zantac once a day in the mornings. See me after the XR study. Call for concerns.          Follow-ups after your visit        Follow-up notes from your care team     Return in about 2 weeks (around 7/20/2018).      Your next 10 appointments already scheduled     Jul 09, 2018 12:00 PM CDT   XR ESOPHAGRAM with EB CHAVARRIA   [facility]    [address]  [place] [id number] [phone]           Please bring a list of your current medicines to your exam. (Include vitamins, minerals and over-the-counter medicines.) Leave your valuables at home.  Tell the doctor if there is a chance you could be pregnant.  Do not eat for 4 hours before the exam. Keep drinking clear liquids until 2 hours before the exam.  You may take pain medicine (with a sip of water) up to 4 hours before the exam.  Do not swallow any other medicines unless your doctor tells you to. Talk to your doctor to be sure it s safe to stop your medicines.  Please call the Imaging Department at your exam site with any questions.            Jul 20, 2018  8:40 AM CDT   Return Visit with Catherine Christy MD   [facility]    [address]  [place] [address] [phone]              Future tests that were ordered for you today     Open Future Orders        Priority Expected Expires Ordered    XR Esophagram Routine 7/6/2018 7/6/2019 7/6/2018            Who to contact     If you have questions or need  "follow up information about today's clinic visit or your schedule please contact [facility] directly at [phone].  Normal or non-critical lab and imaging results will be communicated to you by Code Kingdomshart, letter or phone within 4 business days after the clinic has received the results. If you do not hear from us within 7 days, please contact the clinic through Bio[employer] or phone. If you have a critical or abnormal lab result, we will notify you by phone as soon as possible.  Submit refill requests through "[employer] or call your pharmacy and they will forward the refill request to us. Please allow 3 business days for your refill to be completed.          Additional Information About Your Visit        Code Kingdom[employer]ar[employer] Information     "[employer] gives you secure access to your electronic health record. If you see a primary care provider, you can also send messages to your care team and make appointments. If you have questions, please call your primary care clinic.  If you do not have a primary care provider, please call [phone] and they will assist you.        Care EveryWhere ID     This is your Care EveryWhere ID. This could be used by other organizations to access your [facility] medical records  [id number]        Your Vitals Were     Pulse Temperature Height BMI (Body Mass Index)          60 96.6  F (35.9  C) 5' 4.25\" (1.632 m) 32.87 kg/m2         Blood Pressure from Last 3 Encounters:   07/06/18 110/80   06/27/18 124/80   08/14/17 124/82    Weight from Last 3 Encounters:   07/06/18 193 lb (87.5 kg)   06/27/18 192 lb 8 oz (87.3 kg)   08/14/17 184 lb (83.5 kg)                 Today's Medication Changes          These changes are accurate as of 7/6/18 11:07 AM.  If you have any questions, ask your nurse or doctor.               Start taking these medicines.        Dose/Directions    ranitidine 150 MG tablet   Commonly known as:  ZANTAC   Used for:  Gastritis, bile acid reflux, Dysphagia, unspecified type "   Started by:  Catherine Christy MD        Dose:  150 mg   Take 1 tablet (150 mg) by mouth daily as needed for heartburn   Quantity:  60 tablet   Refills:  1            Where to get your medicines      These medications were sent to [facility] - [place], [place] - [address],  [facility] [address]     Phone:  [phone]     ranitidine 150 MG tablet                Primary Care Provider Office Phone # Fax #    Siddharth Colin Kim [phone]       [address]  [facility] [address]        Equal Access to Services     [facility]: Hadii aad ku hadasho Soomaali, waaxda luqadaha, qaybta kaalmada adeegyada, katie laguerre . So [facility] [phone].    ATENCIÓN: Si habla español, tiene a hernandez disposición servicios gratuitos de asistencia lingüística. [facility] [phone].    We comply with applicable federal civil rights laws and [place] laws. We do not discriminate on the basis of race, color, national origin, age, disability, sex, sexual orientation, or gender identity.            Thank you!     Thank you for choosing [facility]  for your care. Our goal is always to provide you with excellent care. Hearing back from our patients is one way we can continue to improve our services. Please take a few minutes to complete the written survey that you may receive in the mail after your visit with us. Thank you!             Your Updated Medication List - Protect others around you: Learn how to safely use, store and throw away your medicines at www.disposemymeds.org.          This list is accurate as of 7/6/18 11:07 AM.  Always use your most recent med list.                   Brand Name Dispense Instructions for use Diagnosis    aspirin 81 MG chewable tablet      Take 81 mg by mouth daily with food        atorvastatin 40 MG tablet    LIPITOR    90 tablet    Take 1 tablet (40 mg) by mouth daily     Mixed hyperlipidemia       benzonatate 100 MG capsule    TESSALON     Take 100 mg by mouth 3 times daily as needed        celecoxib 100 MG capsule    celeBREX    180 capsule    Take 2 capsules (200 mg) by mouth daily    Primary osteoarthritis involving multiple joints       clindamycin 300 MG capsule    CLEOCIN     TK 2 CS PO 1 HOUR PRIOR TO PROCEDURE        fluocinonide 0.05 % cream    LIDEX    30 g    Use daily as needed for rash.    Rash and nonspecific skin eruption       FLUoxetine 20 MG capsule    PROzac    90 capsule    Take 1 capsule (20 mg) by mouth every morning    Mood disorder (H)       hydrochlorothiazide 25 MG tablet    HYDRODIURIL    90 tablet    Take 1 tablet (25 mg) by mouth daily    Essential hypertension       levothyroxine 175 MCG tablet    SYNTHROID/LEVOTHROID    90 tablet    Take 1 tablet (175 mcg) by mouth daily    Acquired hypothyroidism       metoprolol tartrate 100 MG tablet    LOPRESSOR    180 tablet    Take 1 tablet (100 mg) by mouth 2 times daily    Essential hypertension       MULTI-VITAMINS Tabs           oxybutynin 5 MG tablet    DITROPAN    90 tablet    Take 1 tablet (5 mg) by mouth daily    Urge incontinence       potassium chloride SA 10 MEQ CR tablet    KLOR-CON    90 tablet    Take 1 tablet (10 mEq) by mouth daily    Drug-induced hypokalemia       ranitidine 150 MG tablet    ZANTAC    60 tablet    Take 1 tablet (150 mg) by mouth daily as needed for heartburn    Gastritis, bile acid reflux, Dysphagia, unspecified type       sucralfate 1 GM tablet    CARAFATE    120 tablet    Take 1 tablet (1 g) by mouth 3 times daily (before meals)    Gastroesophageal reflux disease, esophagitis presence not specified       Ubiquinol 100 MG Caps

## 2018-07-09 ENCOUNTER — HOSPITAL ENCOUNTER (OUTPATIENT)
Dept: GENERAL RADIOLOGY | Facility: OTHER | Age: 78
Discharge: HOME | End: 2018-07-09
Attending: SURGERY | Admitting: SURGERY
Payer: MEDICARE

## 2018-07-09 DIAGNOSIS — R13.10 DYSPHAGIA, UNSPECIFIED TYPE: ICD-10-CM

## 2018-07-09 DIAGNOSIS — K44.9 HIATAL HERNIA: ICD-10-CM

## 2018-07-09 PROCEDURE — 25500045 ZZH RX 255: Performed by: SURGERY

## 2018-07-09 PROCEDURE — 74220 X-RAY XM ESOPHAGUS 1CNTRST: CPT

## 2018-07-09 RX ADMIN — ANTACID/ANTIFLATULENT 4 G: 380; 550; 10; 10 GRANULE, EFFERVESCENT ORAL at 13:05

## 2018-07-20 ENCOUNTER — OFFICE VISIT (OUTPATIENT)
Dept: SURGERY | Facility: OTHER | Age: 78
End: 2018-07-20
Attending: SURGERY
Payer: COMMERCIAL

## 2018-07-20 VITALS
HEIGHT: 64 IN | DIASTOLIC BLOOD PRESSURE: 82 MMHG | WEIGHT: 194.2 LBS | SYSTOLIC BLOOD PRESSURE: 132 MMHG | BODY MASS INDEX: 33.16 KG/M2

## 2018-07-20 DIAGNOSIS — R13.19 ESOPHAGEAL DYSPHAGIA: Primary | ICD-10-CM

## 2018-07-20 PROCEDURE — 99213 OFFICE O/P EST LOW 20 MIN: CPT | Performed by: SURGERY

## 2018-07-20 PROCEDURE — G0463 HOSPITAL OUTPT CLINIC VISIT: HCPCS

## 2018-07-20 ASSESSMENT — PAIN SCALES - GENERAL: PAINLEVEL: NO PAIN (0)

## 2018-07-20 NOTE — MR AVS SNAPSHOT
After Visit Summary   7/20/2018    Madiha Beltran    MRN: [id number]           Patient Information     Date Of Birth          1940        Visit Information        Provider Department      7/20/2018 8:40 AM Catherine Christy MD [facility]        Today's Diagnoses     Esophageal dysphagia    -  1      Care Instructions    Call if you have concerns.           Follow-ups after your visit        Follow-up notes from your care team     Return if symptoms worsen or fail to improve.      Your next 10 appointments already scheduled     Aug 03, 2018  8:45 AM CDT   New Visit with Pantera Kaplan MD   [facility] ([facility])    [address]  [place] [address] [phone]              Who to contact     If you have questions or need follow up information about today's clinic visit or your schedule please contact [facility] directly at [phone].  Normal or non-critical lab and imaging results will be communicated to you by Averailhart, letter or phone within 4 business days after the clinic has received the results. If you do not hear from us within 7 days, please contact the clinic through Aver[employer] or phone. If you have a critical or abnormal lab result, we will notify you by phone as soon as possible.  Submit refill requests through [employer] or call your pharmacy and they will forward the refill request to us. Please allow 3 business days for your refill to be completed.          Additional Information About Your Visit        [employer] Information     [employer] gives you secure access to your electronic health record. If you see a primary care provider, you can also send messages to your care team and make appointments. If you have questions, please call your primary care clinic.  If you do not have a primary care provider, please call [phone] and they will assist you.        Care EveryWhere ID     This is  "your Care EveryWhere ID. This could be used by other organizations to access your [facility] medical records  [id number]        Your Vitals Were     Height BMI (Body Mass Index)                5' 4.25\" (1.632 m) 33.08 kg/m2           Blood Pressure from Last 3 Encounters:   07/20/18 132/82   07/06/18 110/80   06/27/18 124/80    Weight from Last 3 Encounters:   07/20/18 194 lb 3.2 oz (88.1 kg)   07/06/18 193 lb (87.5 kg)   06/27/18 192 lb 8 oz (87.3 kg)              Today, you had the following     No orders found for display       Primary Care Provider Office Phone # Fax #    Siddharth Colin Kim [phone] [address] [facility] [address]        Equal Access to Services     [facility]: Hadii milan yeager hadasho Soomaali, waaxda luqadaha, qaybta kaalmada adetishyada, katie laguerre . So [facility] [phone].    ATENCIÓN: Si habla español, tiene a hernandez disposición servicios gratuitos de asistencia lingüística. Llame al [phone].    We comply with applicable federal civil rights laws and [place] laws. We do not discriminate on the basis of race, color, national origin, age, disability, sex, sexual orientation, or gender identity.            Thank you!     Thank you for choosing [facility]  for your care. Our goal is always to provide you with excellent care. Hearing back from our patients is one way we can continue to improve our services. Please take a few minutes to complete the written survey that you may receive in the mail after your visit with us. Thank you!             Your Updated Medication List - Protect others around you: Learn how to safely use, store and throw away your medicines at www.disposemymeds.org.          This list is accurate as of 7/20/18 11:59 PM.  Always use your most recent med list.                   Brand Name Dispense Instructions for use Diagnosis    aspirin 81 MG chewable tablet      Take 81 mg by mouth " daily with food        atorvastatin 40 MG tablet    LIPITOR    90 tablet    Take 1 tablet (40 mg) by mouth daily    Mixed hyperlipidemia       benzonatate 100 MG capsule    TESSALON     Take 100 mg by mouth 3 times daily as needed        celecoxib 100 MG capsule    celeBREX    180 capsule    Take 2 capsules (200 mg) by mouth daily    Primary osteoarthritis involving multiple joints       clindamycin 300 MG capsule    CLEOCIN     TK 2 CS PO 1 HOUR PRIOR TO PROCEDURE        fluocinonide 0.05 % cream    LIDEX    30 g    Use daily as needed for rash.    Rash and nonspecific skin eruption       FLUoxetine 20 MG capsule    PROzac    90 capsule    Take 1 capsule (20 mg) by mouth every morning    Mood disorder (H)       hydrochlorothiazide 25 MG tablet    HYDRODIURIL    90 tablet    Take 1 tablet (25 mg) by mouth daily    Essential hypertension       levothyroxine 175 MCG tablet    SYNTHROID/LEVOTHROID    90 tablet    Take 1 tablet (175 mcg) by mouth daily    Acquired hypothyroidism       metoprolol tartrate 100 MG tablet    LOPRESSOR    180 tablet    Take 1 tablet (100 mg) by mouth 2 times daily    Essential hypertension       MULTI-VITAMINS Tabs           oxybutynin 5 MG tablet    DITROPAN    90 tablet    Take 1 tablet (5 mg) by mouth daily    Urge incontinence       potassium chloride SA 10 MEQ CR tablet    KLOR-CON    90 tablet    Take 1 tablet (10 mEq) by mouth daily    Drug-induced hypokalemia       ranitidine 150 MG tablet    ZANTAC    60 tablet    Take 1 tablet (150 mg) by mouth daily as needed for heartburn    Gastritis, bile acid reflux, Dysphagia, unspecified type       sucralfate 1 GM tablet    CARAFATE    120 tablet    Take 1 tablet (1 g) by mouth 3 times daily (before meals)    Gastroesophageal reflux disease, esophagitis presence not specified       Ubiquinol 100 MG Caps

## 2018-07-25 NOTE — PROGRESS NOTES
Primary Care Physician: Siddharth Kim MD      HPI:   Patient is here for follow up. The patient is 78 year old female with dysphagia. She was seen a couple of weeks ago and started on carafate. She had an esophogram as well. She reports that she has done well since starting the carafate. She is also making sure she takes her time with chewing and is cutting her food in small pieces. She hasn't had any vomiting or abdominal pain.      ASSESSMENT AND PLAN/RECOMMENDATIONS:   Esophageal dysmotility-discussed findings on XR with the patient. No fixed narrowing or blockage. Lining looks healthy. She has muscle dysfunction that is at times keeping even the liquid from going down. We discussed possible speech and swallowing consult. She will continue the carfate as the coating seems to be helping. She will call with concerns. She denies questions at this time.   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, unspecified     Benign myxoma, right knee     Chronic hepatitis (H)     Chronic hepatitis, mild, stable     Diaphragmatic hernia without obstruction or gangrene     No Comments Provided     Disease of stomach and duodenum     Peptic acid disease, esophageal reflux     Frequency of micturition     No Comments Provided     Gastro-esophageal reflux disease without esophagitis     No Comments Provided     Hypothyroidism     No Comments Provided     Major depressive disorder, recurrent, in full remission (H)     No Comments Provided     Malignant neoplasm of endometrium (H)     No Comments Provided     Nontoxic multinodular goiter     No Comments Provided     Other bursitis of hip, right hip     No Comments Provided     Other fecal abnormalities     No Comments Provided     Other seborrheic keratosis     No Comments Provided     Other specified disorders of bone density and structure, unspecified site (CODE)     No Comments Provided     Other specified disorders of  veins     No Comments Provided     Prediabetes     No Comments Provided     Primary generalized (osteo)arthritis     No Comments Provided     Pure hypercholesterolemia     No Comments Provided     Pure hyperglyceridemia     No Comments Provided     Tear of right meniscus as current injury     Torn medial and lateral meniscus right knee with popliteal cyst     Wheezing     Wheezing with possible angina      Past Surgical History:   Procedure Laterality Date     ARTHROPLASTY KNEE      6/2011,right knee arthroplasty     ARTHROPLASTY KNEE      07/2011, left knee arthroplasty     ARTHROSCOPY KNEE      Right knee surgery removal of myxoma     ARTHROSCOPY KNEE      1/16/01,Arthroscopy left knee and right knee.     CHOLECYSTECTOMY      No Comments Provided     COLONOSCOPY      1997,Colonoscopy - Next one due 01/07     COLONOSCOPY      7/31/06,Colonoscopy normal, colonoscopy due in 2016     COLONOSCOPY      8/4/2016,wnl no need for fu     ESOPHAGOSCOPY, GASTROSCOPY, DUODENOSCOPY (EGD), COMBINED      No Comments Provided     ESOPHAGOSCOPY, GASTROSCOPY, DUODENOSCOPY (EGD), COMBINED      7/27/2017     FINGER SURGERY      05/07,Reconstruct left first metacarpocarpal joint     HYSTERECTOMY TOTAL ABDOMINAL, BILATERAL SALPINGO-OOPHORECTOMY, COMBINED      1/21/97,ILIANA/BSO for endometrial carcinoma     HYSTERECTOMY TOTAL ABDOMINAL, BILATERAL SALPINGO-OOPHORECTOMY, COMBINED      1999     LAPAROSCOPIC TUBAL LIGATION      No Comments Provided     LAPAROSCOPY DIAGNOSTIC (GENERAL)      No Comments Provided     OTHER SURGICAL HISTORY      06/08,,HERNIA REPAIR,Periumbilical incisional hernia, repaired     OTHER SURGICAL HISTORY      06/03,200003,HM DEXA SCAN,DXA normal     OTHER SURGICAL HISTORY      7/31/2014,54744.0,GA COLONOSCOPY BIOPSY SINGLE OR MULTIPLE     THYROIDECTOMY      09/2004,Bilateral thyroidectomy for Hashimoto's thyroiditis     Family History   Problem Relation Age of Onset     Hypertension Mother      Hypertension      HEART DISEASE Mother      Heart Disease,CHF     Hypertension Father      Hypertension     HEART DISEASE Father      Heart Disease,CHF     Other - See Comments Maternal Grandmother      Stroke     Hypertension Maternal Grandmother      Hypertension     Other - See Comments Maternal Grandfather      Stroke     Family History Negative Maternal Grandfather      Good Health,Emphysema     Breast Cancer Other 37     Cancer-breast,fast growing     Diabetes Brother      Diabetes     Family History Negative Sister      Good Health     Arthritis Brother      Arthritis,psoriatic arthritis     Other - See Comments Brother       from burst blood vessel, pneumonia on Enbrel     Breast Cancer Other      Cancer-breast,couple of aunts, youngest at mid 40s     Asthma No family hx of      Asthma     Rhinitis No family hx of      Allergic rhinitis     Social History     Social History     Marital status:      Spouse name: N/A     Number of children: N/A     Years of education: N/A     Social History Main Topics     Smoking status: Never Smoker     Smokeless tobacco: Never Used     Alcohol use No     Drug use: None      Comment: Drug use: Not Asked     Sexual activity: Not Asked     Other Topics Concern     None     Social History Narrative    , lives with  Janessa.   Moved into the apartments on [address] in [place]     Current Outpatient Prescriptions   Medication     aspirin 81 MG chewable tablet     atorvastatin (LIPITOR) 40 MG tablet     benzonatate (TESSALON) 100 MG capsule     celecoxib (CELEBREX) 100 MG capsule     clindamycin (CLEOCIN) 300 MG capsule     fluocinonide (LIDEX) 0.05 % cream     FLUoxetine (PROZAC) 20 MG capsule     hydrochlorothiazide (HYDRODIURIL) 25 MG tablet     levothyroxine (SYNTHROID/LEVOTHROID) 175 MCG tablet     metoprolol tartrate (LOPRESSOR) 100 MG tablet     Multiple Vitamin (MULTI-VITAMINS) TABS     oxybutynin (DITROPAN) 5 MG tablet     potassium chloride SA  "(KLOR-CON) 10 MEQ CR tablet     ranitidine (ZANTAC) 150 MG tablet     sucralfate (CARAFATE) 1 GM tablet     Ubiquinol 100 MG CAPS     No current facility-administered medications for this visit.      Allergies   Allergen Reactions     Bicillin C-R, Nausea     Became faint     Diatrizoate Hives     Sulfa Drugs Rash     REVIEW OF SYSTEMS  GENERAL: No fevers or chills. Denies fatigue, recent weight loss.  HEENT: No sinus drainage. No changes with vision or hearing. No difficulty swallowing.   LYMPHATICS:  No swollen nodes in axilla, neck or groin.  CARDIOVASCULAR: Denies chest pain, palpitations and dyspnea on exertion.  PULMONARY: No shortness of breath or cough. No increase in sputum production.  GI: Denies melena, bright red blood in stools. No hematemesis. No constipation or diarrhea.  : No dysuria or hematuria.  SKIN: No recent rashes or ulcers.   HEMATOLOGY:  No history of easy bruising or bleeding.  ENDOCRINE:  No history of diabetes, takes thyroid medicine.  NEUROLOGY:  No seizures or headaches. No motor or sensory changes.    PHYSICAL EXAM  Vitals: /82 (BP Location: Right arm, Patient Position: Sitting, Cuff Size: Adult Regular)  Ht 5' 4.25\" (1.632 m)  Wt 194 lb 3.2 oz (88.1 kg)  BMI 33.08 kg/m2  GENERAL: Healthy appearing patient in no acute distress. Pleasant and cooperative with exam and interview.   HEENT:Head-normocephalic. Eyes-no scleral icterus. Nose-no nasal drainage. No lesions. Mouth-oral mucosa pink and moist, no lesions.  NECK: Supple. No thyroid nodules. Tracheamidline.  LYMPHATICS:  No cervical, axillary or supraclavicular adenopathy.  LUNGS:  No respiratory distress. Clear bilaterally to auscultation.  ABDOMEN: Non distended. Bowel sounds active. Soft, non-tender, no hepatosplenomegaly or hernias. No peritoneal signs.  SKIN: Pink, warm and dry. No jaundice. No rash.  NEURO:  Cranial nerves II-XII grossly intact. Alert and oriented.  PSYCH: Appropriate mood and " affect.    IMAGING/LAB  I personally reviewed patient's esophagram images and report-as above-small sliding hiatal hernia noted as well.

## 2018-08-03 ENCOUNTER — HOSPITAL ENCOUNTER (OUTPATIENT)
Dept: GENERAL RADIOLOGY | Facility: OTHER | Age: 78
Discharge: HOME | End: 2018-08-03
Attending: SPECIALIST | Admitting: SPECIALIST
Payer: MEDICARE

## 2018-08-03 ENCOUNTER — TRANSFERRED RECORDS (OUTPATIENT)
Dept: HEALTH INFORMATION MANAGEMENT | Facility: OTHER | Age: 78
End: 2018-08-03

## 2018-08-03 ENCOUNTER — OFFICE VISIT (OUTPATIENT)
Dept: ORTHOPEDICS | Facility: OTHER | Age: 78
End: 2018-08-03
Attending: SPECIALIST
Payer: MEDICARE

## 2018-08-03 VITALS
DIASTOLIC BLOOD PRESSURE: 80 MMHG | HEART RATE: 60 BPM | BODY MASS INDEX: 32.44 KG/M2 | HEIGHT: 64 IN | WEIGHT: 190 LBS | SYSTOLIC BLOOD PRESSURE: 130 MMHG

## 2018-08-03 DIAGNOSIS — M25.511 RIGHT SHOULDER PAIN, UNSPECIFIED CHRONICITY: Primary | ICD-10-CM

## 2018-08-03 DIAGNOSIS — M25.511 RIGHT SHOULDER PAIN, UNSPECIFIED CHRONICITY: ICD-10-CM

## 2018-08-03 PROCEDURE — 73030 X-RAY EXAM OF SHOULDER: CPT | Mod: RT

## 2018-08-03 PROCEDURE — G0463 HOSPITAL OUTPT CLINIC VISIT: HCPCS

## 2018-08-03 ASSESSMENT — PAIN SCALES - GENERAL: PAINLEVEL: NO PAIN (0)

## 2018-08-03 NOTE — MR AVS SNAPSHOT
"              After Visit Summary   8/3/2018    Madiha Beltran    MRN: [id number]           Patient Information     Date Of Birth          1940        Visit Information        Provider Department      8/3/2018 8:45 AM Pantera Kaplan MD [facility]        Today's Diagnoses     Right shoulder pain, unspecified chronicity    -  1       Follow-ups after your visit        Who to contact     If you have questions or need follow up information about today's clinic visit or your schedule please contact [facility] directly at [phone].  Normal or non-critical lab and imaging results will be communicated to you by StyleSeekhart, letter or phone within 4 business days after the clinic has received the results. If you do not hear from us within 7 days, please contact the clinic through tenXert or phone. If you have a critical or abnormal lab result, we will notify you by phone as soon as possible.  Submit refill requests through Resoomay or call your pharmacy and they will forward the refill request to us. Please allow 3 business days for your refill to be completed.          Additional Information About Your Visit        MyChart Information     Resoomay gives you secure access to your electronic health record. If you see a primary care provider, you can also send messages to your care team and make appointments. If you have questions, please call your primary care clinic.  If you do not have a primary care provider, please call [phone] and they will assist you.        Care EveryWhere ID     This is your Care EveryWhere ID. This could be used by other organizations to access your [facility] medical records  [id number]        Your Vitals Were     Pulse Height BMI (Body Mass Index)             60 1.626 m (5' 4\") 32.61 kg/m2          Blood Pressure from Last 3 Encounters:   08/03/18 130/80   07/20/18 132/82   07/06/18 110/80    Weight from Last 3 Encounters:   08/03/18 86.2 kg " (190 lb)   07/20/18 88.1 kg (194 lb 3.2 oz)   07/06/18 87.5 kg (193 lb)              Today, you had the following     No orders found for display       Primary Care Provider Office Phone # Fax #    Siddharth Colin Kim [phone]       [address]  [place] [address]        Equal Access to Services     [facility]: Hadii aad ku hadasho Soomaali, waaxda luqadaha, qaybta kaalmada adeegyada, waxay idiin hayaan adeeg carmelitadaxaanna lakezian . So [facility] [phone].    ATENCIÓN: Si ashala leora, tiene a hernandez disposición servicios gratuitos de asistencia lingüística. Cieraame al [phone].    We comply with applicable federal civil rights laws and [place] laws. We do not discriminate on the basis of race, color, national origin, age, disability, sex, sexual orientation, or gender identity.            Thank you!     Thank you for choosing [facility] AND [facility]  for your care. Our goal is always to provide you with excellent care. Hearing back from our patients is one way we can continue to improve our services. Please take a few minutes to complete the written survey that you may receive in the mail after your visit with us. Thank you!             Your Updated Medication List - Protect others around you: Learn how to safely use, store and throw away your medicines at www.disposemymeds.org.          This list is accurate as of 8/3/18 11:59 PM.  Always use your most recent med list.                   Brand Name Dispense Instructions for use Diagnosis    aspirin 81 MG chewable tablet      Take 81 mg by mouth daily with food        atorvastatin 40 MG tablet    LIPITOR    90 tablet    Take 1 tablet (40 mg) by mouth daily    Mixed hyperlipidemia       benzonatate 100 MG capsule    TESSALON     Take 100 mg by mouth 3 times daily as needed        celecoxib 100 MG capsule    celeBREX    180 capsule    Take 2 capsules (200 mg) by mouth daily    Primary osteoarthritis involving multiple joints        clindamycin 300 MG capsule    CLEOCIN     TK 2 CS PO 1 HOUR PRIOR TO PROCEDURE        fluocinonide 0.05 % cream    LIDEX    30 g    Use daily as needed for rash.    Rash and nonspecific skin eruption       FLUoxetine 20 MG capsule    PROzac    90 capsule    Take 1 capsule (20 mg) by mouth every morning    Mood disorder (H)       hydrochlorothiazide 25 MG tablet    HYDRODIURIL    90 tablet    Take 1 tablet (25 mg) by mouth daily    Essential hypertension       levothyroxine 175 MCG tablet    SYNTHROID/LEVOTHROID    90 tablet    Take 1 tablet (175 mcg) by mouth daily    Acquired hypothyroidism       metoprolol tartrate 100 MG tablet    LOPRESSOR    180 tablet    Take 1 tablet (100 mg) by mouth 2 times daily    Essential hypertension       MULTI-VITAMINS Tabs           oxybutynin 5 MG tablet    DITROPAN    90 tablet    Take 1 tablet (5 mg) by mouth daily    Urge incontinence       potassium chloride SA 10 MEQ CR tablet    KLOR-CON    90 tablet    Take 1 tablet (10 mEq) by mouth daily    Drug-induced hypokalemia       ranitidine 150 MG tablet    ZANTAC    60 tablet    Take 1 tablet (150 mg) by mouth daily as needed for heartburn    Gastritis, bile acid reflux, Dysphagia, unspecified type       sucralfate 1 GM tablet    CARAFATE    120 tablet    Take 1 tablet (1 g) by mouth 3 times daily (before meals)    Gastroesophageal reflux disease, esophagitis presence not specified       Ubiquinol 100 MG Caps

## 2018-08-30 ENCOUNTER — MYC MEDICAL ADVICE (OUTPATIENT)
Dept: PEDIATRICS | Facility: OTHER | Age: 78
End: 2018-08-30

## 2018-08-30 DIAGNOSIS — E03.9 ACQUIRED HYPOTHYROIDISM: ICD-10-CM

## 2018-08-30 LAB — TSH SERPL DL<=0.05 MIU/L-ACNC: 1.82 IU/ML (ref 0.34–5.6)

## 2018-08-30 PROCEDURE — 36415 COLL VENOUS BLD VENIPUNCTURE: CPT | Performed by: INTERNAL MEDICINE

## 2018-08-30 PROCEDURE — 84443 ASSAY THYROID STIM HORMONE: CPT | Performed by: INTERNAL MEDICINE

## 2018-08-30 RX ORDER — LEVOTHYROXINE SODIUM 175 UG/1
175 TABLET ORAL DAILY
Qty: 90 TABLET | Refills: 3 | Status: SHIPPED | OUTPATIENT
Start: 2018-08-30 | End: 2019-06-27

## 2018-09-04 ENCOUNTER — HOSPITAL ENCOUNTER (OUTPATIENT)
Dept: MAMMOGRAPHY | Facility: OTHER | Age: 78
Discharge: HOME | End: 2018-09-04
Attending: INTERNAL MEDICINE | Admitting: INTERNAL MEDICINE
Payer: MEDICARE

## 2018-09-04 DIAGNOSIS — Z12.39 SCREENING BREAST EXAMINATION: ICD-10-CM

## 2018-09-04 PROCEDURE — 77067 SCR MAMMO BI INCL CAD: CPT

## 2018-10-02 ENCOUNTER — ALLIED HEALTH/NURSE VISIT (OUTPATIENT)
Dept: FAMILY MEDICINE | Facility: OTHER | Age: 78
End: 2018-10-02
Payer: MEDICARE

## 2018-10-02 DIAGNOSIS — Z23 NEED FOR PROPHYLACTIC VACCINATION AND INOCULATION AGAINST INFLUENZA: Primary | ICD-10-CM

## 2018-10-02 PROCEDURE — 90662 IIV NO PRSV INCREASED AG IM: CPT

## 2018-10-02 PROCEDURE — G0008 ADMIN INFLUENZA VIRUS VAC: HCPCS

## 2018-10-02 NOTE — MR AVS SNAPSHOT
After Visit Summary   10/2/2018    Madiha Beltran    MRN: [id number]           Patient Information     Date Of Birth          1940        Visit Information        Provider Department      10/2/2018 8:00 AM GH FLU [facility]        Today's Diagnoses     Need for prophylactic vaccination and inoculation against influenza    -  1       Follow-ups after your visit        Who to contact     If you have questions or need follow up information about today's clinic visit or your schedule please contact [facility] directly at [phone].  Normal or non-critical lab and imaging results will be communicated to you by [email or web address], letter or phone within 4 business days after the clinic has received the results. If you do not hear from us within 7 days, please contact the clinic through Jobzella or phone. If you have a critical or abnormal lab result, we will notify you by phone as soon as possible.  Submit refill requests through Jobzella or call your pharmacy and they will forward the refill request to us. Please allow 3 business days for your refill to be completed.          Additional Information About Your Visit        MyChart Information     Jobzella gives you secure access to your electronic health record. If you see a primary care provider, you can also send messages to your care team and make appointments. If you have questions, please call your primary care clinic.  If you do not have a primary care provider, please call [phone] and they will assist you.        Care EveryWhere ID     This is your Care EveryWhere ID. This could be used by other organizations to access your [facility] medical records  [id number]         Blood Pressure from Last 3 Encounters:   08/03/18 130/80   07/20/18 132/82   07/06/18 110/80    Weight from Last 3 Encounters:   08/03/18 190 lb (86.2 kg)   07/20/18 194 lb 3.2 oz (88.1 kg)   07/06/18 193 lb (87.5 kg)              We  Performed the Following     HC FLU VACCINE, INCREASED ANTIGEN, PRESV FREE        Primary Care Provider Office Phone # Fax #    Siddharth Colin Kim [phone] [address]    [facility] [address]        Equal Access to Services     [facility] : Hadsade yeager camo Sosagrarioali, waaxda luqadaha, qaybta kaalmada adetishyada, waxivan maine marymj maedaxaanna gallegos. So [facility] [phone].    ATENCIÓN: Si habla español, tiene a hernandez disposición servicios gratuitos de asistencia lingüística. Llame al [phone].    We comply with applicable federal civil rights laws and [place] laws. We do not discriminate on the basis of race, color, national origin, age, disability, sex, sexual orientation, or gender identity.            Thank you!     Thank you for choosing [facility]  for your care. Our goal is always to provide you with excellent care. Hearing back from our patients is one way we can continue to improve our services. Please take a few minutes to complete the written survey that you may receive in the mail after your visit with us. Thank you!             Your Updated Medication List - Protect others around you: Learn how to safely use, store and throw away your medicines at www.disposemymeds.org.          This list is accurate as of 10/2/18  3:29 PM.  Always use your most recent med list.                   Brand Name Dispense Instructions for use Diagnosis    aspirin 81 MG chewable tablet      Take 81 mg by mouth daily with food        atorvastatin 40 MG tablet    LIPITOR    90 tablet    Take 1 tablet (40 mg) by mouth daily    Mixed hyperlipidemia       benzonatate 100 MG capsule    TESSALON     Take 100 mg by mouth 3 times daily as needed        celecoxib 100 MG capsule    celeBREX    180 capsule    Take 2 capsules (200 mg) by mouth daily    Primary osteoarthritis involving multiple joints       clindamycin 300 MG capsule    CLEOCIN     TK 2 CS PO 1 HOUR PRIOR  TO PROCEDURE        fluocinonide 0.05 % cream    LIDEX    30 g    Use daily as needed for rash.    Rash and nonspecific skin eruption       FLUoxetine 20 MG capsule    PROzac    90 capsule    Take 1 capsule (20 mg) by mouth every morning    Mood disorder (H)       hydrochlorothiazide 25 MG tablet    HYDRODIURIL    90 tablet    Take 1 tablet (25 mg) by mouth daily    Essential hypertension       levothyroxine 175 MCG tablet    SYNTHROID/LEVOTHROID    90 tablet    Take 1 tablet (175 mcg) by mouth daily    Acquired hypothyroidism       metoprolol tartrate 100 MG tablet    LOPRESSOR    180 tablet    Take 1 tablet (100 mg) by mouth 2 times daily    Essential hypertension       MULTI-VITAMINS Tabs           oxybutynin 5 MG tablet    DITROPAN    90 tablet    Take 1 tablet (5 mg) by mouth daily    Urge incontinence       potassium chloride SA 10 MEQ CR tablet    KLOR-CON    90 tablet    Take 1 tablet (10 mEq) by mouth daily    Drug-induced hypokalemia       ranitidine 150 MG tablet    ZANTAC    60 tablet    Take 1 tablet (150 mg) by mouth daily as needed for heartburn    Gastritis, bile acid reflux, Dysphagia, unspecified type       sucralfate 1 GM tablet    CARAFATE    120 tablet    Take 1 tablet (1 g) by mouth 3 times daily (before meals)    Gastroesophageal reflux disease, esophagitis presence not specified       Ubiquinol 100 MG Caps

## 2018-10-02 NOTE — PROGRESS NOTES

## 2018-12-29 DIAGNOSIS — E87.6 DRUG-INDUCED HYPOKALEMIA: ICD-10-CM

## 2018-12-29 DIAGNOSIS — T50.905A DRUG-INDUCED HYPOKALEMIA: ICD-10-CM

## 2019-01-03 RX ORDER — POTASSIUM CHLORIDE 750 MG/1
TABLET, EXTENDED RELEASE ORAL
Qty: 90 TABLET | Refills: 0 | OUTPATIENT
Start: 2019-01-03

## 2019-01-03 NOTE — TELEPHONE ENCOUNTER
Filled 6/27/18 #90 x 4. Pharmacy alerted. Unable to complete prescription refill per RNMedication Refill Policy.................... Lexi Mello ....................  1/3/2019   12:07 PM    potassium chloride SA (KLOR-CON) 10 MEQ CR tablet 90 tablet 4 6/27/2018  No   Sig - Route: Take 1 tablet (10 mEq) by mouth daily - Oral   Sent to pharmacy as: potassium chloride SA (KLOR-CON) 10 MEQ CR tablet   Class: E-Prescribe   Order: [id number]   E-Prescribing Status: Receipt confirmed by pharmacy (6/27/2018  8:49 AM CDT)   Printout Tracking     External Result Report   Pharmacy     [facility] - [place], [place] - [address] AT PORTAL TO REGISTERED [facility]

## 2019-04-13 DIAGNOSIS — F39 MOOD DISORDER (H): ICD-10-CM

## 2019-04-13 DIAGNOSIS — N39.41 URGE INCONTINENCE: ICD-10-CM

## 2019-04-13 DIAGNOSIS — I10 ESSENTIAL HYPERTENSION: ICD-10-CM

## 2019-04-15 RX ORDER — OXYBUTYNIN CHLORIDE 5 MG/1
TABLET ORAL
Qty: 90 TABLET | Refills: 0 | Status: SHIPPED | OUTPATIENT
Start: 2019-04-15 | End: 2019-05-24

## 2019-04-15 RX ORDER — METOPROLOL TARTRATE 100 MG
TABLET ORAL
Qty: 180 TABLET | Refills: 0 | Status: SHIPPED | OUTPATIENT
Start: 2019-04-15 | End: 2019-05-24

## 2019-04-15 NOTE — TELEPHONE ENCOUNTER
"Requested Prescriptions   Pending Prescriptions Disp Refills     metoprolol tartrate (LOPRESSOR) 100 MG tablet [Pharmacy Med Name: METOPROLOL TAB [id number]] 180 tablet 3     Sig: TAKE 1 TABLET TWICE A DAY       Beta-Blockers Protocol Passed - 4/13/2019  3:33 PM        Passed - Blood pressure under 140/90 in past 12 months     BP Readings from Last 3 Encounters:   08/03/18 130/80   07/20/18 132/82   07/06/18 110/80                 Passed - Patient is age 6 or older        Passed - Recent (12 mo) or future (30 days) visit within the authorizing provider's specialty     Patient had office visit in the last 12 months or has a visit in the next 30 days with authorizing provider or within the authorizing provider's specialty.  See \"Patient Info\" tab in inbasket, or \"Choose Columns\" in Meds & Orders section of the refill encounter.              Passed - Medication is active on med list        oxybutynin (DITROPAN) 5 MG tablet [Pharmacy Med Name: OXYBUTYNIN TAB 5MG] 90 tablet 3     Sig: TAKE 1 TABLET DAILY       Muscarinic Antagonists (Urinary Incontinence Agents) Passed - 4/13/2019  3:33 PM        Passed - Recent (12 mo) or future (30 days) visit within the authorizing provider's specialty     Patient had office visit in the last 12 months or has a visit in the next 30 days with authorizing provider or within the authorizing provider's specialty.  See \"Patient Info\" tab in inbasket, or \"Choose Columns\" in Meds & Orders section of the refill encounter.              Passed - Medication is Oxybutynin and patient is 5 years of age or older        Passed - Patient does not have a diagnosis of glaucoma on the problem list     If glaucoma diagnosis is new, refer refill to physician.          Passed - Medication is active on med list        Passed - Patient is 18 years of age or older        FLUoxetine (PROZAC) 20 MG capsule [Pharmacy Med Name: FLUOXETIN(P) CAP 20MG] 90 capsule 3     Sig: TAKE 1 CAPSULE EVERY       MORNING       " "SSRIs Protocol Passed - 4/13/2019  3:33 PM        Passed - Recent (12 mo) or future (30 days) visit within the authorizing provider's specialty     Patient had office visit in the last 12 months or has a visit in the next 30 days with authorizing provider or within the authorizing provider's specialty.  See \"Patient Info\" tab in inbasket, or \"Choose Columns\" in Meds & Orders section of the refill encounter.              Passed - Medication is active on med list        Passed - Patient is age 18 or older        Passed - No active pregnancy on record        Passed - No positive pregnancy test in last 12 months      LOV06/27/2019  Prescription approved per [facility] Refill Protocol.      "

## 2019-04-24 ENCOUNTER — MYC REFILL (OUTPATIENT)
Dept: PEDIATRICS | Facility: OTHER | Age: 79
End: 2019-04-24

## 2019-04-24 ENCOUNTER — MYC MEDICAL ADVICE (OUTPATIENT)
Dept: PEDIATRICS | Facility: OTHER | Age: 79
End: 2019-04-24

## 2019-04-24 DIAGNOSIS — I10 ESSENTIAL HYPERTENSION: ICD-10-CM

## 2019-04-24 RX ORDER — HYDROCHLOROTHIAZIDE 25 MG/1
25 TABLET ORAL DAILY
Qty: 90 TABLET | Refills: 4 | Status: CANCELLED | OUTPATIENT
Start: 2019-04-24

## 2019-04-25 ENCOUNTER — MYC REFILL (OUTPATIENT)
Dept: PEDIATRICS | Facility: OTHER | Age: 79
End: 2019-04-25

## 2019-04-25 DIAGNOSIS — I10 ESSENTIAL HYPERTENSION: ICD-10-CM

## 2019-04-25 RX ORDER — HYDROCHLOROTHIAZIDE 25 MG/1
25 TABLET ORAL DAILY
Qty: 90 TABLET | Refills: 4 | Status: SHIPPED | OUTPATIENT
Start: 2019-04-25 | End: 2019-04-25

## 2019-04-26 NOTE — TELEPHONE ENCOUNTER
hydrochlorothiazide (HYDRODIURIL) 25 MG tablet 90 tablet 4 4/25/2019  No   Sig - Route: Take 1 tablet (25 mg) by mouth daily - Oral   Sent to pharmacy as: hydrochlorothiazide (HYDRODIURIL) 25 MG tablet   Class: E-Prescribe

## 2019-04-29 RX ORDER — HYDROCHLOROTHIAZIDE 25 MG/1
25 TABLET ORAL DAILY
Qty: 60 TABLET | Refills: 0 | Status: SHIPPED | OUTPATIENT
Start: 2019-04-29 | End: 2019-06-27

## 2019-04-29 NOTE — TELEPHONE ENCOUNTER
"Requested Prescriptions   Pending Prescriptions Disp Refills     hydrochlorothiazide (HYDRODIURIL) 25 MG tablet 90 tablet 4     Sig: Take 1 tablet (25 mg) by mouth daily       Diuretics (Including Combos) Protocol Passed - 4/26/2019  8:03 AM        Passed - Blood pressure under 140/90 in past 12 months     BP Readings from Last 3 Encounters:   08/03/18 130/80   07/20/18 132/82   07/06/18 110/80                 Passed - Recent (12 mo) or future (30 days) visit within the authorizing provider's specialty     Patient had office visit in the last 12 months or has a visit in the next 30 days with authorizing provider or within the authorizing provider's specialty.  See \"Patient Info\" tab in inbasket, or \"Choose Columns\" in Meds & Orders section of the refill encounter.              Passed - Medication is active on med list        Passed - Patient is age 18 or older        Passed - No active pregancy on record        Passed - Normal serum creatinine on file in past 12 months     Recent Labs   Lab Test 06/27/18  0900   CR 0.86              Passed - Normal serum potassium on file in past 12 months     Recent Labs   Lab Test 06/27/18  0900   POTASSIUM 3.8                    Passed - Normal serum sodium on file in past 12 months     Recent Labs   Lab Test 06/27/18  0900                 Passed - No positive pregnancy test in past 12 months        LOV 6/27/18    Prescription approved per [facility] Refill Protocol.    "

## 2019-05-24 DIAGNOSIS — T50.905A DRUG-INDUCED HYPOKALEMIA: ICD-10-CM

## 2019-05-24 DIAGNOSIS — E87.6 DRUG-INDUCED HYPOKALEMIA: ICD-10-CM

## 2019-05-24 DIAGNOSIS — F39 MOOD DISORDER (H): ICD-10-CM

## 2019-05-24 DIAGNOSIS — I10 ESSENTIAL HYPERTENSION: ICD-10-CM

## 2019-05-24 DIAGNOSIS — N39.41 URGE INCONTINENCE: ICD-10-CM

## 2019-05-24 DIAGNOSIS — E78.2 MIXED HYPERLIPIDEMIA: ICD-10-CM

## 2019-05-24 DIAGNOSIS — M15.0 PRIMARY OSTEOARTHRITIS INVOLVING MULTIPLE JOINTS: ICD-10-CM

## 2019-05-28 RX ORDER — METOPROLOL TARTRATE 100 MG
TABLET ORAL
Qty: 180 TABLET | Refills: 0 | Status: SHIPPED | OUTPATIENT
Start: 2019-05-28 | End: 2019-06-27

## 2019-05-28 RX ORDER — POTASSIUM CHLORIDE 750 MG/1
TABLET, EXTENDED RELEASE ORAL
Qty: 90 TABLET | Refills: 0 | Status: SHIPPED | OUTPATIENT
Start: 2019-05-28 | End: 2019-06-27

## 2019-05-28 RX ORDER — ATORVASTATIN CALCIUM 40 MG/1
TABLET, FILM COATED ORAL
Qty: 90 TABLET | Refills: 0 | Status: SHIPPED | OUTPATIENT
Start: 2019-05-28 | End: 2019-06-27

## 2019-05-28 RX ORDER — OXYBUTYNIN CHLORIDE 5 MG/1
TABLET ORAL
Qty: 90 TABLET | Refills: 0 | Status: SHIPPED | OUTPATIENT
Start: 2019-05-28 | End: 2019-06-27

## 2019-05-28 RX ORDER — CELECOXIB 100 MG/1
CAPSULE ORAL
Qty: 180 CAPSULE | Refills: 0 | Status: SHIPPED | OUTPATIENT
Start: 2019-05-28 | End: 2019-06-27

## 2019-05-28 NOTE — TELEPHONE ENCOUNTER
Routing refill request to provider for review/approval because:  Drug not on the [facility] refill protocol for over age 64 yrs  Drug interaction warning with fluoxetine   Labs abnormal: CBC    LOV 6-27-18.  Joycelyn Kaye RN on 5/28/2019 at 3:48 PM

## 2019-06-27 ENCOUNTER — OFFICE VISIT (OUTPATIENT)
Dept: PEDIATRICS | Facility: OTHER | Age: 79
End: 2019-06-27
Attending: INTERNAL MEDICINE
Payer: MEDICARE

## 2019-06-27 VITALS
BODY MASS INDEX: 31.53 KG/M2 | DIASTOLIC BLOOD PRESSURE: 80 MMHG | SYSTOLIC BLOOD PRESSURE: 134 MMHG | WEIGHT: 184.7 LBS | HEIGHT: 64 IN | RESPIRATION RATE: 14 BRPM | TEMPERATURE: 96.5 F | HEART RATE: 52 BPM

## 2019-06-27 DIAGNOSIS — N39.41 URGE INCONTINENCE: ICD-10-CM

## 2019-06-27 DIAGNOSIS — E03.9 ACQUIRED HYPOTHYROIDISM: ICD-10-CM

## 2019-06-27 DIAGNOSIS — K44.9 HIATAL HERNIA: ICD-10-CM

## 2019-06-27 DIAGNOSIS — L82.0 INFLAMED SEBORRHEIC KERATOSIS: ICD-10-CM

## 2019-06-27 DIAGNOSIS — I10 ESSENTIAL HYPERTENSION: ICD-10-CM

## 2019-06-27 DIAGNOSIS — I83.90 SPIDER VEIN OF LOWER EXTREMITY: Primary | ICD-10-CM

## 2019-06-27 DIAGNOSIS — E87.6 DRUG-INDUCED HYPOKALEMIA: ICD-10-CM

## 2019-06-27 DIAGNOSIS — T50.905A DRUG-INDUCED HYPOKALEMIA: ICD-10-CM

## 2019-06-27 DIAGNOSIS — E78.2 MIXED HYPERLIPIDEMIA: ICD-10-CM

## 2019-06-27 DIAGNOSIS — F39 MOOD DISORDER (H): ICD-10-CM

## 2019-06-27 DIAGNOSIS — K22.4 ESOPHAGEAL DYSMOTILITY: ICD-10-CM

## 2019-06-27 DIAGNOSIS — K21.9 GASTROESOPHAGEAL REFLUX DISEASE, ESOPHAGITIS PRESENCE NOT SPECIFIED: ICD-10-CM

## 2019-06-27 DIAGNOSIS — R10.11 RUQ ABDOMINAL PAIN: ICD-10-CM

## 2019-06-27 DIAGNOSIS — M15.0 PRIMARY OSTEOARTHRITIS INVOLVING MULTIPLE JOINTS: ICD-10-CM

## 2019-06-27 LAB
ALBUMIN SERPL-MCNC: 3.9 G/DL (ref 3.5–5.7)
ALP SERPL-CCNC: 96 U/L (ref 34–104)
ALT SERPL W P-5'-P-CCNC: 21 U/L (ref 7–52)
AMYLASE SERPL-CCNC: 14 U/L (ref 29–103)
ANION GAP SERPL CALCULATED.3IONS-SCNC: 9 MMOL/L (ref 3–14)
AST SERPL W P-5'-P-CCNC: 24 U/L (ref 13–39)
BASOPHILS # BLD AUTO: 0.1 10E9/L (ref 0–0.2)
BASOPHILS NFR BLD AUTO: 0.8 %
BILIRUB SERPL-MCNC: 1 MG/DL (ref 0.3–1)
BUN SERPL-MCNC: 11 MG/DL (ref 7–25)
CALCIUM SERPL-MCNC: 9 MG/DL (ref 8.6–10.3)
CHLORIDE SERPL-SCNC: 99 MMOL/L (ref 98–107)
CHOLEST SERPL-MCNC: 187 MG/DL
CO2 SERPL-SCNC: 32 MMOL/L (ref 21–31)
CREAT SERPL-MCNC: 0.79 MG/DL (ref 0.6–1.2)
DIFFERENTIAL METHOD BLD: NORMAL
EOSINOPHIL # BLD AUTO: 0.1 10E9/L (ref 0–0.7)
EOSINOPHIL NFR BLD AUTO: 1.4 %
ERYTHROCYTE [DISTWIDTH] IN BLOOD BY AUTOMATED COUNT: 12.2 % (ref 10–15)
GFR SERPL CREATININE-BSD FRML MDRD: 71 ML/MIN/{1.73_M2}
GLUCOSE SERPL-MCNC: 108 MG/DL (ref 70–105)
HCT VFR BLD AUTO: 45.7 % (ref 35–47)
HDLC SERPL-MCNC: 47 MG/DL (ref 23–92)
HGB BLD-MCNC: 15.4 G/DL (ref 11.7–15.7)
IMM GRANULOCYTES # BLD: 0 10E9/L (ref 0–0.4)
IMM GRANULOCYTES NFR BLD: 0.4 %
LDLC SERPL CALC-MCNC: 73 MG/DL
LIPASE SERPL-CCNC: 8 U/L (ref 11–82)
LYMPHOCYTES # BLD AUTO: 1.8 10E9/L (ref 0.8–5.3)
LYMPHOCYTES NFR BLD AUTO: 24.6 %
MCH RBC QN AUTO: 31.6 PG (ref 26.5–33)
MCHC RBC AUTO-ENTMCNC: 33.7 G/DL (ref 31.5–36.5)
MCV RBC AUTO: 94 FL (ref 78–100)
MONOCYTES # BLD AUTO: 0.7 10E9/L (ref 0–1.3)
MONOCYTES NFR BLD AUTO: 10.1 %
NEUTROPHILS # BLD AUTO: 4.6 10E9/L (ref 1.6–8.3)
NEUTROPHILS NFR BLD AUTO: 62.7 %
NONHDLC SERPL-MCNC: 140 MG/DL
PLATELET # BLD AUTO: 246 10E9/L (ref 150–450)
POTASSIUM SERPL-SCNC: 3.4 MMOL/L (ref 3.5–5.1)
PROT SERPL-MCNC: 6.8 G/DL (ref 6.4–8.9)
RBC # BLD AUTO: 4.87 10E12/L (ref 3.8–5.2)
SODIUM SERPL-SCNC: 140 MMOL/L (ref 134–144)
TRIGL SERPL-MCNC: 334 MG/DL
TSH SERPL DL<=0.05 MIU/L-ACNC: <0.2 IU/ML (ref 0.34–5.6)
WBC # BLD AUTO: 7.3 10E9/L (ref 4–11)

## 2019-06-27 PROCEDURE — 83690 ASSAY OF LIPASE: CPT | Mod: ZL | Performed by: INTERNAL MEDICINE

## 2019-06-27 PROCEDURE — 85025 COMPLETE CBC W/AUTO DIFF WBC: CPT | Mod: ZL | Performed by: INTERNAL MEDICINE

## 2019-06-27 PROCEDURE — 80061 LIPID PANEL: CPT | Mod: ZL | Performed by: INTERNAL MEDICINE

## 2019-06-27 PROCEDURE — 82150 ASSAY OF AMYLASE: CPT | Mod: ZL | Performed by: INTERNAL MEDICINE

## 2019-06-27 PROCEDURE — 99214 OFFICE O/P EST MOD 30 MIN: CPT | Performed by: INTERNAL MEDICINE

## 2019-06-27 PROCEDURE — G0463 HOSPITAL OUTPT CLINIC VISIT: HCPCS

## 2019-06-27 PROCEDURE — 36415 COLL VENOUS BLD VENIPUNCTURE: CPT | Mod: ZL | Performed by: INTERNAL MEDICINE

## 2019-06-27 PROCEDURE — 84443 ASSAY THYROID STIM HORMONE: CPT | Mod: ZL | Performed by: INTERNAL MEDICINE

## 2019-06-27 PROCEDURE — 80053 COMPREHEN METABOLIC PANEL: CPT | Mod: ZL | Performed by: INTERNAL MEDICINE

## 2019-06-27 RX ORDER — METOPROLOL TARTRATE 100 MG
100 TABLET ORAL 2 TIMES DAILY
Qty: 180 TABLET | Refills: 4 | Status: SHIPPED | OUTPATIENT
Start: 2019-06-27 | End: 2020-06-29

## 2019-06-27 RX ORDER — OXYBUTYNIN CHLORIDE 5 MG/1
5 TABLET ORAL DAILY
Qty: 90 TABLET | Refills: 4 | Status: SHIPPED | OUTPATIENT
Start: 2019-06-27 | End: 2020-06-29

## 2019-06-27 RX ORDER — SUCRALFATE 1 G/1
1 TABLET ORAL
Qty: 270 TABLET | Refills: 4 | Status: SHIPPED | OUTPATIENT
Start: 2019-06-27 | End: 2020-06-29

## 2019-06-27 RX ORDER — LEVOTHYROXINE SODIUM 150 UG/1
150 TABLET ORAL DAILY
Qty: 90 TABLET | Refills: 3 | Status: SHIPPED | OUTPATIENT
Start: 2019-06-27 | End: 2019-10-22

## 2019-06-27 RX ORDER — POTASSIUM CHLORIDE 750 MG/1
10 TABLET, EXTENDED RELEASE ORAL DAILY
Qty: 90 TABLET | Refills: 4 | Status: SHIPPED | OUTPATIENT
Start: 2019-06-27 | End: 2020-06-29

## 2019-06-27 RX ORDER — ATORVASTATIN CALCIUM 40 MG/1
40 TABLET, FILM COATED ORAL DAILY
Qty: 90 TABLET | Refills: 4 | Status: SHIPPED | OUTPATIENT
Start: 2019-06-27 | End: 2020-06-29

## 2019-06-27 RX ORDER — CELECOXIB 100 MG/1
100 CAPSULE ORAL 2 TIMES DAILY
Qty: 180 CAPSULE | Refills: 4 | Status: SHIPPED | OUTPATIENT
Start: 2019-06-27 | End: 2020-06-29

## 2019-06-27 RX ORDER — HYDROCHLOROTHIAZIDE 25 MG/1
25 TABLET ORAL DAILY
Qty: 90 TABLET | Refills: 4 | Status: SHIPPED | OUTPATIENT
Start: 2019-06-27 | End: 2020-06-29

## 2019-06-27 RX ORDER — LEVOTHYROXINE SODIUM 175 UG/1
175 TABLET ORAL DAILY
Qty: 90 TABLET | Refills: 4 | Status: SHIPPED | OUTPATIENT
Start: 2019-06-27 | End: 2019-06-27

## 2019-06-27 ASSESSMENT — PATIENT HEALTH QUESTIONNAIRE - PHQ9: SUM OF ALL RESPONSES TO PHQ QUESTIONS 1-9: 3

## 2019-06-27 ASSESSMENT — PAIN SCALES - GENERAL: PAINLEVEL: MILD PAIN (3)

## 2019-06-27 ASSESSMENT — MIFFLIN-ST. JEOR: SCORE: 1307.79

## 2019-06-27 NOTE — NURSING NOTE
Patient presents to clinic for annual PX and medication review.  Erica Sherwood LPN ....................  6/27/2019   8:39 AM      No LMP recorded. Patient has had a hysterectomy.  Medication Reconciliation: complete    Erica Sultana LPN  6/27/2019 8:39 AM

## 2019-06-27 NOTE — PROGRESS NOTES
Subjective  Madiha Beltran is a 79 year old female who presents for medication management.  She has a history of osteoarthritis of multiple joints and continues on Celebrex.  History of hyperlipidemia which is stable on Lipitor.  History of mood disorder which is stable on Prozac.  History of hypertension which is stable on hydrochlorothiazide, metoprolol with potassium supplementation.  History of hypothyroidism which is stable on levothyroxine.  She is been having a lot of right upper quadrant discomfort and rare left upper quadrant.  She is been taking Carafate and Zantac.  She is not sure its helping that much.  She only takes Zantac daily.  She tends to have more heartburn at nighttime.  She underwent an EGD with Dr. Christy in the past, as well as a swallowing test.  When she tries to swallow food sometimes gets stuck down in the lower esophagus.  She often cannot finish a full meal at a restaurant but has to take the meal home.  Later she can return to the meal.  History of overactive bladder which is stable with oxybutynin.  She is noticed that there is a nodule on her earlobe on both sides.  She wonders if there might be some fluid there.  No chest pains with exertion.  She has a spot on her medial right breast and her groin area.  One on the groin is a little roughness around it.  They have been there a minimum of 7 years.  No major changes.    Problem List/PMH: reviewed in EMR, and made relevant updates today.  Medications: reviewed in EMR, and made relevant updates today.  Allergies: reviewed in EMR, and made relevant updates today.    Social Hx:  Social History     Tobacco Use     Smoking status: Never Smoker     Smokeless tobacco: Never Used   Substance Use Topics     Alcohol use: No     Drug use: Unknown     Types: Other     Comment: Drug use: Not Asked     Social History     Social History Narrative    , lives with  Janessa.   Moved into the apartments on [facility] in  "[place]     I reviewed social history and made relevant updates today.    Family Hx:   Family History   Problem Relation Age of Onset     Hypertension Mother         Hypertension     Heart Disease Mother         Heart Disease,CHF     Hypertension Father         Hypertension     Heart Disease Father         Heart Disease,CHF     Other - See Comments Maternal Grandmother         Stroke     Hypertension Maternal Grandmother         Hypertension     Other - See Comments Maternal Grandfather         Stroke     Family History Negative Maternal Grandfather         Good Health,Emphysema     Breast Cancer Other 37        Cancer-breast,fast growing     Diabetes Brother         Diabetes     Family History Negative Sister         Good Health     Arthritis Brother         Arthritis,psoriatic arthritis     Other - See Comments Brother          from burst blood vessel, pneumonia on Enbrel     Breast Cancer Other         Cancer-breast,couple of aunts, youngest at mid 40s     Asthma No family hx of         Asthma     Rhinitis No family hx of         Allergic rhinitis       Objective  Vitals: reviewed in EMR.  /80 (BP Location: Right arm, Patient Position: Sitting, Cuff Size: Adult Large)   Pulse 52   Temp 96.5  F (35.8  C) (Tympanic)   Resp 14   Ht 1.626 m (5' 4\")   Wt 83.8 kg (184 lb 11.2 oz)   Breastfeeding? No   BMI 31.70 kg/m      Gen: Pleasant female, NAD.  HEENT: MMM, no OP erythema.   Neck: Supple, no JVD, no bruits.  CV: RRR no m/r/g.   Pulm: CTAB no w/r/r  GI: Soft, NT, ND. No HSM. No masses. Bowel sounds present.  Neuro: Grossly intact  Msk: No lower extremity edema.  Skin: Stuck on appearing brown plaque on the medial right breast without pigmentary asymmetry or bleeding  Psychiatric: Normal affect and insight. Does not appear anxious or depressed.    Labs:  Lab Results   Component Value Date    WBC 7.3 2019    HGB 15.4 2019    HCT 45.7 2019     2019    CHOL 187 2019 "    TRIG 334 (H) 06/27/2019    HDL 47 06/27/2019    ALT 21 06/27/2019    AST 24 06/27/2019     06/27/2019    BUN 11 06/27/2019    CO2 32 (H) 06/27/2019     Labs:  Glucose   Date Value Ref Range Status   06/27/2019 108 (H) 70 - 105 mg/dL Final   06/27/2018 107 (H) 70 - 105 mg/dL Final     Hemoglobin A1C   Date Value Ref Range Status   06/27/2018 5.9 4.0 - 6.0 % Final     Creatinine   Date Value Ref Range Status   06/27/2019 0.79 0.60 - 1.20 mg/dL Final   06/27/2018 0.86 0.60 - 1.20 mg/dL Final     LDL Cholesterol Calculated   Date Value Ref Range Status   06/27/2019 73 <100 mg/dL Final     Comment:     Desirable:       <100 mg/dl       Exam Information     Exam Date Exam Time Accession # Performing Department Results    7/9/18 12:55 PM [id number] [facility]    PACS Images      Show images for XR Esophagram   Study Result     PROCEDURE: XR ESOPHAGRAM     HISTORY:  ; Hiatal hernia; Dysphagia, unspecified type     TECHNIQUE:  A biphasic esophagram was performed. Effervescent crystals  and thick followed by thin barium were administered orally. 47 seconds  of fluoroscopy time.     COMPARISON:  7/7/14     FINDINGS:       There is a small variable sliding hiatal hernia. Esophageal  distensibility is normal. The esophageal mucosa is smooth. There is  moderate esophageal dysmotility with retained contents and 2 and fro  motion. No reflux was seen or elicited. Limited images of the stomach  are unremarkable.                                                                      IMPRESSION:       Variable sliding hiatal hernia. Moderate thoracic esophageal  dysmotility.     DONY HARMON MD           Assessment    ICD-10-CM    1. Spider vein of lower extremity I83.90    2. Gastroesophageal reflux disease, esophagitis presence not specified K21.9 sucralfate (CARAFATE) 1 GM tablet     omeprazole (PRILOSEC) 20 MG DR capsule     GASTROENTEROLOGY ADULT REF CONSULT ONLY   3. Urge incontinence  N39.41 oxybutynin (DITROPAN) 5 MG tablet   4. Essential hypertension I10 metoprolol tartrate (LOPRESSOR) 100 MG tablet     hydrochlorothiazide (HYDRODIURIL) 25 MG tablet   5. Acquired hypothyroidism E03.9 Thyrotropin GH     Thyrotropin GH     levothyroxine (SYNTHROID/LEVOTHROID) 150 MCG tablet     Thyrotropin GH     DISCONTINUED: levothyroxine (SYNTHROID/LEVOTHROID) 175 MCG tablet   6. Drug-induced hypokalemia E87.6 potassium chloride ER (KLOR-CON) 10 MEQ CR tablet    T50.905A    7. Mood disorder (H) F39 FLUoxetine (PROZAC) 20 MG capsule   8. Primary osteoarthritis involving multiple joints M15.0 celecoxib (CELEBREX) 100 MG capsule   9. Mixed hyperlipidemia E78.2 atorvastatin (LIPITOR) 40 MG tablet     Lipid Profile     Lipid Profile   10. Esophageal dysmotility K22.4 omeprazole (PRILOSEC) 20 MG DR capsule     GASTROENTEROLOGY ADULT REF CONSULT ONLY   11. Hiatal hernia K44.9 omeprazole (PRILOSEC) 20 MG DR capsule     GASTROENTEROLOGY ADULT REF CONSULT ONLY   12. RUQ abdominal pain R10.11 CBC with platelets differential     Comprehensive metabolic panel     Amylase     Lipase     Lipase     Amylase     Comprehensive metabolic panel     CBC with platelets differential   13. Inflamed seborrheic keratosis L82.0      Orders Placed This Encounter   Procedures     CBC with platelets differential     Comprehensive metabolic panel     Amylase     Lipase     Lipid Profile     Thyrotropin GH     Thyrotropin GH     GASTROENTEROLOGY ADULT REF CONSULT ONLY       Given the ongoing right upper quadrant symptoms that are most consistent with gastritis and the findings of bile acid reflux on EGD, with hiatal hernia and esophageal dysmotility on imaging study I recommend GI consultation.  Its reasonable to start a PPI in order to try to both diagnostically and therapeutically answer that question whether or not the acid is causing a component.    Plan   -- Expected clinical course discussed   -- Medications and their side effects  discussed  Patient Instructions      -- Continue carafate   -- Start omeprazole   -- Okay to use ranitidine twice daily as needed   -- GI consult for dysmotility   -- See if you can tolerate off of celebrex, as this may be affecting your stomach too   -- Shingles series at nurse-only or pharmacy    Patient Education     Understanding Seborrheic Keratosis  Seborrheic keratoses are wart-like growths on the skin. These growths are not cancer. Many people get them, especially after age 30.  How to say it  [id number]   What causes seborrheic keratoses?  Doctors do not know what causes seborrheic keratoses. They may run in families. In addition, they become more common as people get older.  What are the symptoms of seborrheic keratoses?  Here is what seborrheic keratoses often look like:    They tend to be round or oval in shape. They can be very small or about as big across as a quarter.    They can appear singly or in clusters.    They tend to be tan, brown, or black in color.    The edges may be scalloped or uneven-looking.    They can have a waxy or scaly look.    They can be a bit raised, appearing to be  stuck on  the skin.    They may become red and irritated if scratched or rubbed by clothing  Sebhorreic keratoses are not painful, but they may be itchy. They can become irritated if they are continually rubbed by skin or clothing. Seborrheic keratoses most often appear on the face, arms, chest, back, or belly.  How are seborrheic keratoses treated?  Seborrheic keratoses don t need to be treated unless they bother you. You may choose to have them removed because you find them unattractive. You may also want them removed because they get irritated and uncomfortable. A healthcare provider can remove them in an office visit. Ways that seborrheic keratoses can be removed include:    Freezing them off with liquid nitrogen    Cutting them off with a scalpel    Burning them off with electricity  What are  the complications of seborrheic keratoses?  Seborrheic keratoses are not cancer, but they can look like some types of skin cancer. So it s a good idea to ask your healthcare provider to check any new skin growths. Ask your healthcare provider about any skin problem that concerns you.  When should I call my healthcare provider?  Call your healthcare provider right away if any of these occur:    You develop a lot of seborrheic keratoses very quickly    You have a sore that does not heal within a few weeks, or heals and then comes back    You have a mole or skin growth that is changing in size, shape, or color    You have a mole or skin growth that looks different on one side from the other    You have a mole or skin growth that is not the same color throughout   Date Last Reviewed: 5/1/2016 2000-2018 [employer]. [address] [place] [address]. All rights reserved. This information is not intended as a substitute for professional medical care. Always follow your healthcare professional's instructions.               Return in about 1 year (around 6/27/2020) for medication management.    Siddharth Miranda MD  Internal Medicine & Pediatrics

## 2019-06-27 NOTE — PATIENT INSTRUCTIONS
-- Continue carafate   -- Start omeprazole   -- Okay to use ranitidine twice daily as needed   -- GI consult for dysmotility   -- See if you can tolerate off of celebrex, as this may be affecting your stomach too   -- Shingles series at nurse-only or pharmacy    Patient Education     Understanding Seborrheic Keratosis  Seborrheic keratoses are wart-like growths on the skin. These growths are not cancer. Many people get them, especially after age 30.  How to say it  [id number]   What causes seborrheic keratoses?  Doctors do not know what causes seborrheic keratoses. They may run in families. In addition, they become more common as people get older.  What are the symptoms of seborrheic keratoses?  Here is what seborrheic keratoses often look like:    They tend to be round or oval in shape. They can be very small or about as big across as a quarter.    They can appear singly or in clusters.    They tend to be tan, brown, or black in color.    The edges may be scalloped or uneven-looking.    They can have a waxy or scaly look.    They can be a bit raised, appearing to be  stuck on  the skin.    They may become red and irritated if scratched or rubbed by clothing  Sebhorreic keratoses are not painful, but they may be itchy. They can become irritated if they are continually rubbed by skin or clothing. Seborrheic keratoses most often appear on the face, arms, chest, back, or belly.  How are seborrheic keratoses treated?  Seborrheic keratoses don t need to be treated unless they bother you. You may choose to have them removed because you find them unattractive. You may also want them removed because they get irritated and uncomfortable. A healthcare provider can remove them in an office visit. Ways that seborrheic keratoses can be removed include:    Freezing them off with liquid nitrogen    Cutting them off with a scalpel    Burning them off with electricity  What are the complications of seborrheic  keratoses?  Seborrheic keratoses are not cancer, but they can look like some types of skin cancer. So it s a good idea to ask your healthcare provider to check any new skin growths. Ask your healthcare provider about any skin problem that concerns you.  When should I call my healthcare provider?  Call your healthcare provider right away if any of these occur:    You develop a lot of seborrheic keratoses very quickly    You have a sore that does not heal within a few weeks, or heals and then comes back    You have a mole or skin growth that is changing in size, shape, or color    You have a mole or skin growth that looks different on one side from the other    You have a mole or skin growth that is not the same color throughout   Date Last Reviewed: 5/1/2016 2000-2018 [employer]. [address] [place] [address], [place] [address]. All rights reserved. This information is not intended as a substitute for professional medical care. Always follow your healthcare professional's instructions.

## 2019-09-05 ENCOUNTER — HOSPITAL ENCOUNTER (OUTPATIENT)
Dept: MAMMOGRAPHY | Facility: OTHER | Age: 79
Discharge: HOME | End: 2019-09-05
Attending: INTERNAL MEDICINE | Admitting: INTERNAL MEDICINE
Payer: MEDICARE

## 2019-09-05 DIAGNOSIS — Z12.31 VISIT FOR SCREENING MAMMOGRAM: ICD-10-CM

## 2019-09-05 PROCEDURE — 77063 BREAST TOMOSYNTHESIS BI: CPT

## 2019-10-21 DIAGNOSIS — E03.9 ACQUIRED HYPOTHYROIDISM: ICD-10-CM

## 2019-10-21 LAB — TSH SERPL DL<=0.05 MIU/L-ACNC: <0.2 IU/ML (ref 0.34–5.6)

## 2019-10-21 PROCEDURE — 36415 COLL VENOUS BLD VENIPUNCTURE: CPT | Mod: ZL | Performed by: INTERNAL MEDICINE

## 2019-10-21 PROCEDURE — 84443 ASSAY THYROID STIM HORMONE: CPT | Mod: ZL | Performed by: INTERNAL MEDICINE

## 2019-10-22 ENCOUNTER — TELEPHONE (OUTPATIENT)
Dept: PEDIATRICS | Facility: OTHER | Age: 79
End: 2019-10-22

## 2019-10-22 DIAGNOSIS — E03.9 ACQUIRED HYPOTHYROIDISM: ICD-10-CM

## 2019-10-22 RX ORDER — LEVOTHYROXINE SODIUM 137 UG/1
150 TABLET ORAL DAILY
Qty: 90 TABLET | Refills: 3 | Status: SHIPPED | OUTPATIENT
Start: 2019-10-22 | End: 2020-01-23 | Stop reason: DRUGHIGH

## 2019-10-22 NOTE — TELEPHONE ENCOUNTER
Thyroid too low.      ICD-10-CM    1. Acquired hypothyroidism E03.9 levothyroxine (SYNTHROID/LEVOTHROID) 137 MCG tablet     Thyrotropin GH      Orders Placed This Encounter   Medications     levothyroxine (SYNTHROID/LEVOTHROID) 137 MCG tablet     Sig: Take 1 tablet (137 mcg) by mouth daily     Dispense:  90 tablet     Refill:  3     Orders Placed This Encounter   Procedures     Thyrotropin GH      -- Reduce levothyroxine dose from 150 to 137 mcg   -- Lab only for TSH in 3 months    Signed, Siddharth Kim MD, FAAP, FACP  Internal Medicine & Pediatrics

## 2019-10-23 NOTE — TELEPHONE ENCOUNTER
After verifying pts name and  pt informed of lab result and med change.  Pt voiced understanding and was then transferred to reception to make an appt for 3 month lab follow up.  Kate Toure LPN on 10/23/2019 at 9:30 AM

## 2019-10-24 ENCOUNTER — ALLIED HEALTH/NURSE VISIT (OUTPATIENT)
Dept: FAMILY MEDICINE | Facility: OTHER | Age: 79
End: 2019-10-24
Attending: INTERNAL MEDICINE
Payer: MEDICARE

## 2019-10-24 DIAGNOSIS — Z23 NEED FOR PROPHYLACTIC VACCINATION AND INOCULATION AGAINST INFLUENZA: Primary | ICD-10-CM

## 2019-10-24 PROCEDURE — G0008 ADMIN INFLUENZA VIRUS VAC: HCPCS

## 2019-10-24 PROCEDURE — 90662 IIV NO PRSV INCREASED AG IM: CPT

## 2019-11-27 DIAGNOSIS — K21.9 GASTROESOPHAGEAL REFLUX DISEASE, ESOPHAGITIS PRESENCE NOT SPECIFIED: ICD-10-CM

## 2019-11-27 DIAGNOSIS — K22.4 ESOPHAGEAL DYSMOTILITY: ICD-10-CM

## 2019-11-27 DIAGNOSIS — K44.9 HIATAL HERNIA: ICD-10-CM

## 2019-12-02 NOTE — TELEPHONE ENCOUNTER
"Requested Prescriptions   Pending Prescriptions Disp Refills     omeprazole (PRILOSEC) 20 MG DR capsule [Pharmacy Med Name: OMEPRAZOL DR CAP 20MG RX] 90 capsule 1     Sig: TAKE 1 CAPSULE DAILY       PPI Protocol Passed - 11/27/2019  9:11 PM        Passed - Not on Clopidogrel (unless Pantoprazole ordered)        Passed - No diagnosis of osteoporosis on record        Passed - Recent (12 mo) or future (30 days) visit within the authorizing provider's specialty     Patient has had an office visit with the authorizing provider or a provider within the authorizing providers department within the previous 12 mos or has a future within next 30 days. See \"Patient Info\" tab in inbasket, or \"Choose Columns\" in Meds & Orders section of the refill encounter.              Passed - Medication is active on med list        Passed - Patient is age 18 or older        Passed - No active pregnacy on record        Passed - No positive pregnancy test in past 12 months        lov 06/27/19  Prescription approved per [facility] Refill Protocol.    "

## 2020-01-23 DIAGNOSIS — E03.9 ACQUIRED HYPOTHYROIDISM: ICD-10-CM

## 2020-01-23 LAB — TSH SERPL DL<=0.05 MIU/L-ACNC: 0.21 IU/ML (ref 0.34–5.6)

## 2020-01-23 PROCEDURE — 36415 COLL VENOUS BLD VENIPUNCTURE: CPT | Mod: ZL | Performed by: INTERNAL MEDICINE

## 2020-01-23 PROCEDURE — 84443 ASSAY THYROID STIM HORMONE: CPT | Mod: ZL | Performed by: INTERNAL MEDICINE

## 2020-01-23 RX ORDER — LEVOTHYROXINE SODIUM 125 UG/1
125 TABLET ORAL DAILY
Qty: 90 TABLET | Refills: 3 | Status: SHIPPED | OUTPATIENT
Start: 2020-01-23 | End: 2020-04-20

## 2020-03-11 ENCOUNTER — HEALTH MAINTENANCE LETTER (OUTPATIENT)
Age: 80
End: 2020-03-11

## 2020-04-13 ENCOUNTER — MYC MEDICAL ADVICE (OUTPATIENT)
Dept: PEDIATRICS | Facility: OTHER | Age: 80
End: 2020-04-13

## 2020-04-13 DIAGNOSIS — E03.9 ACQUIRED HYPOTHYROIDISM: ICD-10-CM

## 2020-04-20 RX ORDER — LEVOTHYROXINE SODIUM 125 UG/1
125 TABLET ORAL DAILY
Qty: 90 TABLET | Refills: 1 | Status: SHIPPED | OUTPATIENT
Start: 2020-04-20 | End: 2020-09-09

## 2020-04-20 NOTE — TELEPHONE ENCOUNTER
After verifing patients last name and . She was informed that a script was sent to her pharmacy.    Fabiola Bentley LPN-----2020  9:37 AM

## 2020-06-29 ENCOUNTER — HOSPITAL ENCOUNTER (OUTPATIENT)
Dept: GENERAL RADIOLOGY | Facility: OTHER | Age: 80
End: 2020-06-29
Attending: INTERNAL MEDICINE
Payer: MEDICARE

## 2020-06-29 ENCOUNTER — OFFICE VISIT (OUTPATIENT)
Dept: PEDIATRICS | Facility: OTHER | Age: 80
End: 2020-06-29
Attending: INTERNAL MEDICINE
Payer: MEDICARE

## 2020-06-29 VITALS
SYSTOLIC BLOOD PRESSURE: 132 MMHG | WEIGHT: 176.6 LBS | HEIGHT: 64 IN | BODY MASS INDEX: 30.15 KG/M2 | TEMPERATURE: 96.9 F | DIASTOLIC BLOOD PRESSURE: 78 MMHG | OXYGEN SATURATION: 91 % | HEART RATE: 53 BPM | RESPIRATION RATE: 16 BRPM

## 2020-06-29 DIAGNOSIS — T50.905A DRUG-INDUCED HYPOKALEMIA: ICD-10-CM

## 2020-06-29 DIAGNOSIS — I10 ESSENTIAL HYPERTENSION: Chronic | ICD-10-CM

## 2020-06-29 DIAGNOSIS — M46.1 SACROILIITIS (H): ICD-10-CM

## 2020-06-29 DIAGNOSIS — M15.0 PRIMARY OSTEOARTHRITIS INVOLVING MULTIPLE JOINTS: ICD-10-CM

## 2020-06-29 DIAGNOSIS — R73.03 PREDIABETES: Chronic | ICD-10-CM

## 2020-06-29 DIAGNOSIS — E87.6 DRUG-INDUCED HYPOKALEMIA: ICD-10-CM

## 2020-06-29 DIAGNOSIS — M41.34 THORACOGENIC SCOLIOSIS OF THORACIC REGION: ICD-10-CM

## 2020-06-29 DIAGNOSIS — E78.2 MIXED HYPERLIPIDEMIA: Primary | Chronic | ICD-10-CM

## 2020-06-29 DIAGNOSIS — K21.9 GASTROESOPHAGEAL REFLUX DISEASE, ESOPHAGITIS PRESENCE NOT SPECIFIED: ICD-10-CM

## 2020-06-29 DIAGNOSIS — N39.41 URGE INCONTINENCE: ICD-10-CM

## 2020-06-29 DIAGNOSIS — E87.6 HYPOKALEMIA: ICD-10-CM

## 2020-06-29 DIAGNOSIS — E03.9 ACQUIRED HYPOTHYROIDISM: ICD-10-CM

## 2020-06-29 DIAGNOSIS — F39 MOOD DISORDER (H): ICD-10-CM

## 2020-06-29 LAB
ANION GAP SERPL CALCULATED.3IONS-SCNC: 9 MMOL/L (ref 3–14)
BUN SERPL-MCNC: 15 MG/DL (ref 7–25)
CALCIUM SERPL-MCNC: 8.8 MG/DL (ref 8.6–10.3)
CHLORIDE SERPL-SCNC: 99 MMOL/L (ref 98–107)
CHOLEST SERPL-MCNC: 192 MG/DL
CO2 SERPL-SCNC: 33 MMOL/L (ref 21–31)
CREAT SERPL-MCNC: 0.91 MG/DL (ref 0.6–1.2)
GFR SERPL CREATININE-BSD FRML MDRD: 60 ML/MIN/{1.73_M2}
GLUCOSE SERPL-MCNC: 109 MG/DL (ref 70–105)
HBA1C MFR BLD: 5.8 % (ref 4–6)
HDLC SERPL-MCNC: 47 MG/DL (ref 23–92)
LDLC SERPL CALC-MCNC: 94 MG/DL
NONHDLC SERPL-MCNC: 145 MG/DL
POTASSIUM SERPL-SCNC: 3 MMOL/L (ref 3.5–5.1)
SODIUM SERPL-SCNC: 141 MMOL/L (ref 134–144)
TRIGL SERPL-MCNC: 253 MG/DL
TSH SERPL DL<=0.05 MIU/L-ACNC: 0.4 IU/ML (ref 0.34–5.6)

## 2020-06-29 PROCEDURE — 72072 X-RAY EXAM THORAC SPINE 3VWS: CPT

## 2020-06-29 PROCEDURE — 80061 LIPID PANEL: CPT | Mod: ZL | Performed by: INTERNAL MEDICINE

## 2020-06-29 PROCEDURE — 99214 OFFICE O/P EST MOD 30 MIN: CPT | Performed by: INTERNAL MEDICINE

## 2020-06-29 PROCEDURE — 83036 HEMOGLOBIN GLYCOSYLATED A1C: CPT | Mod: ZL | Performed by: INTERNAL MEDICINE

## 2020-06-29 PROCEDURE — 36415 COLL VENOUS BLD VENIPUNCTURE: CPT | Mod: ZL | Performed by: INTERNAL MEDICINE

## 2020-06-29 PROCEDURE — 84443 ASSAY THYROID STIM HORMONE: CPT | Mod: ZL | Performed by: INTERNAL MEDICINE

## 2020-06-29 PROCEDURE — 73010 X-RAY EXAM OF SHOULDER BLADE: CPT | Mod: RT

## 2020-06-29 PROCEDURE — 80048 BASIC METABOLIC PNL TOTAL CA: CPT | Mod: ZL | Performed by: INTERNAL MEDICINE

## 2020-06-29 PROCEDURE — G0463 HOSPITAL OUTPT CLINIC VISIT: HCPCS

## 2020-06-29 PROCEDURE — G0463 HOSPITAL OUTPT CLINIC VISIT: HCPCS | Mod: 25

## 2020-06-29 RX ORDER — HYDROCHLOROTHIAZIDE 25 MG/1
25 TABLET ORAL DAILY
Qty: 90 TABLET | Refills: 3 | Status: SHIPPED | OUTPATIENT
Start: 2020-06-29 | End: 2021-07-05

## 2020-06-29 RX ORDER — CELECOXIB 100 MG/1
100 CAPSULE ORAL 2 TIMES DAILY
Qty: 180 CAPSULE | Refills: 3 | Status: SHIPPED | OUTPATIENT
Start: 2020-06-29 | End: 2021-07-05

## 2020-06-29 RX ORDER — ATORVASTATIN CALCIUM 40 MG/1
40 TABLET, FILM COATED ORAL DAILY
Qty: 90 TABLET | Refills: 3 | Status: SHIPPED | OUTPATIENT
Start: 2020-06-29 | End: 2021-07-05

## 2020-06-29 RX ORDER — METOPROLOL TARTRATE 100 MG
100 TABLET ORAL 2 TIMES DAILY
Qty: 180 TABLET | Refills: 3 | Status: SHIPPED | OUTPATIENT
Start: 2020-06-29 | End: 2021-07-05

## 2020-06-29 RX ORDER — POTASSIUM CHLORIDE 750 MG/1
20 TABLET, EXTENDED RELEASE ORAL DAILY
Qty: 180 TABLET | Refills: 3 | Status: SHIPPED | OUTPATIENT
Start: 2020-06-29 | End: 2020-08-13

## 2020-06-29 RX ORDER — OXYBUTYNIN CHLORIDE 5 MG/1
5 TABLET ORAL DAILY
Qty: 90 TABLET | Refills: 3 | Status: SHIPPED | OUTPATIENT
Start: 2020-06-29 | End: 2021-07-05

## 2020-06-29 RX ORDER — POTASSIUM CHLORIDE 750 MG/1
10 TABLET, EXTENDED RELEASE ORAL DAILY
Qty: 90 TABLET | Refills: 3 | Status: SHIPPED | OUTPATIENT
Start: 2020-06-29 | End: 2020-06-29

## 2020-06-29 RX ORDER — SUCRALFATE 1 G/1
1 TABLET ORAL
Qty: 270 TABLET | Refills: 3 | Status: SHIPPED | OUTPATIENT
Start: 2020-06-29 | End: 2021-07-05

## 2020-06-29 ASSESSMENT — PATIENT HEALTH QUESTIONNAIRE - PHQ9: SUM OF ALL RESPONSES TO PHQ QUESTIONS 1-9: 0

## 2020-06-29 ASSESSMENT — MIFFLIN-ST. JEOR: SCORE: 1258.11

## 2020-06-29 ASSESSMENT — PAIN SCALES - GENERAL: PAINLEVEL: NO PAIN (0)

## 2020-06-29 NOTE — NURSING NOTE
"Chief Complaint   Patient presents with     Recheck Medication     Patient is here for a medication check     Initial There were no vitals taken for this visit. Estimated body mass index is 31.7 kg/m  as calculated from the following:    Height as of 6/27/19: 1.626 m (5' 4\").    Weight as of 6/27/19: 83.8 kg (184 lb 11.2 oz).  Medication Reconciliation: complete    Donna Xavier MA  "

## 2020-06-29 NOTE — PROGRESS NOTES
Subjective  Madiha Beltran is a 80 year old female who presents for medication management.  She wants me to look at her weight in the chart.  She is been working very hard on losing weight.  She is down about 20 pounds.  She is been having some pain in her shoulder blades and tailbone.  It feels like a throbbing and aching sensation.  No known alleviating or exacerbating features.  No fall or trauma.  She has a history of chronic osteoarthritis of multiple joints which has been stable on Celebrex.  History of gastroesophageal reflux disease which is stable with omeprazole and sucralfate.  History of mood disorder which is stable with Prozac.  History of hypothyroidism stable on levothyroxine.  History of hyperlipidemia which is stable on atorvastatin.  History of hypertension which is stable on metoprolol, hydrochlorothiazide.  Chronic hypokalemia which has been stable on potassium supplementation.  No chest pains with exertion.    Problem List/PMH: reviewed in EMR, and made relevant updates today.  Medications: reviewed in EMR, and made relevant updates today.  Allergies: reviewed in EMR, and made relevant updates today.    Social Hx:  Social History     Tobacco Use     Smoking status: Never Smoker     Smokeless tobacco: Never Used   Substance Use Topics     Alcohol use: No     Drug use: Never     Types: Other     Social History     Social History Narrative    , lives with  Janessa.   Moved into the apartments on [facility]     I reviewed social history and made relevant updates today.    Family Hx:   Family History   Problem Relation Age of Onset     Hypertension Mother         Hypertension     Heart Disease Mother         Heart Disease,CHF     Hypertension Father         Hypertension     Heart Disease Father         Heart Disease,CHF     Other - See Comments Maternal Grandmother         Stroke     Hypertension Maternal Grandmother         Hypertension     Other - See Comments  "Maternal Grandfather         Stroke     Family History Negative Maternal Grandfather         Good Health,Emphysema     Breast Cancer Other 37        Cancer-breast,fast growing     Diabetes Brother         Diabetes     Family History Negative Sister         Good Health     Arthritis Brother         Arthritis,psoriatic arthritis     Other - See Comments Brother          from burst blood vessel, pneumonia on Enbrel     Breast Cancer Other         Cancer-breast,couple of aunts, youngest at mid 40s     Asthma No family hx of         Asthma     Rhinitis No family hx of         Allergic rhinitis       Objective  Vitals: reviewed in EMR.  /78 (BP Location: Left arm, Patient Position: Sitting, Cuff Size: Adult Regular)   Pulse 53   Temp 96.9  F (36.1  C) (Tympanic)   Resp 16   Ht 1.613 m (5' 3.5\")   Wt 80.1 kg (176 lb 9.6 oz)   LMP  (LMP Unknown)   SpO2 91%   Breastfeeding No   BMI 30.79 kg/m      Gen: Pleasant female, NAD.  HEENT: MMM, no OP erythema.   Neck: Supple, no JVD, no bruits.  CV: RRR no m/r/g.   Pulm: CTAB no w/r/r  Back: Significant scoliosis is present with elevation of the right posterior thorax.  No point bony tenderness to palpation or percussion.  Neuro: Grossly intact  Msk: No lower extremity edema.  Tenderness to palpation over the left sacroiliac joint.  Negative Juanpablo sign bilaterally.  Significant tightness in the hip flexor and extensors bilaterally.  Skin: No concerning lesions.  Psychiatric: Normal affect and insight. Does not appear anxious or depressed.    Labs:  Lab Results   Component Value Date    WBC 7.3 2019    HGB 15.4 2019    HCT 45.7 2019     2019    CHOL 192 2020    TRIG 253 (H) 2020    HDL 47 2020    ALT 21 2019    AST 24 2019     2020    BUN 15 2020    CO2 33 (H) 2020       Glucose   Date Value Ref Range Status   2020 109 (H) 70 - 105 mg/dL Final   2019 108 (H) 70 - 105 " mg/dL Final     Hemoglobin A1C   Date Value Ref Range Status   06/29/2020 5.8 4.0 - 6.0 % Final   06/27/2018 5.9 4.0 - 6.0 % Final     Creatinine   Date Value Ref Range Status   06/29/2020 0.91 0.60 - 1.20 mg/dL Final   06/27/2019 0.79 0.60 - 1.20 mg/dL Final     LDL Cholesterol Calculated   Date Value Ref Range Status   06/29/2020 94 <100 mg/dL Final     Comment:     Desirable:       <100 mg/dl     Thyrotropin   Date Value Ref Range Status   06/29/2020 0.40 0.34 - 5.60 IU/mL Final   01/23/2020 0.21 (L) 0.34 - 5.60 IU/mL Final   10/21/2019 <0.20 (L) 0.34 - 5.60 IU/mL Final   06/27/2019 <0.20 (L) 0.34 - 5.60 IU/mL Final   08/30/2018 1.82 0.34 - 5.60 IU/mL Final               Assessment    ICD-10-CM    1. Mixed hyperlipidemia  E78.2 Lipid Profile     Basic Metabolic Panel     atorvastatin (LIPITOR) 40 MG tablet     Basic Metabolic Panel     Lipid Profile   2. Prediabetes  R73.03 Basic Metabolic Panel     Hemoglobin A1c     Hemoglobin A1c     Basic Metabolic Panel   3. Essential hypertension  I10 Basic Metabolic Panel     hydrochlorothiazide (HYDRODIURIL) 25 MG tablet     metoprolol tartrate (LOPRESSOR) 100 MG tablet     Basic Metabolic Panel   4. Primary osteoarthritis involving multiple joints  M89.49 celecoxib (CELEBREX) 100 MG capsule   5. Mood disorder (H)  F39 FLUoxetine (PROZAC) 20 MG capsule   6. Urge incontinence  N39.41 oxybutynin (DITROPAN) 5 MG tablet   7. Drug-induced hypokalemia  E87.6 potassium chloride ER (KLOR-CON) 10 MEQ CR tablet    T50.905A DISCONTINUED: potassium chloride ER (KLOR-CON) 10 MEQ CR tablet   8. Gastroesophageal reflux disease, esophagitis presence not specified  K21.9 sucralfate (CARAFATE) 1 GM tablet   9. Thoracogenic scoliosis of thoracic region  M41.34 PHYSICAL THERAPY REFERRAL     XR Thoracic Spine 3 Views     XR Scapula Right     XR Scapula Left   10. Sacroiliitis (H)  M46.1 PHYSICAL THERAPY REFERRAL     XR Thoracic Spine 3 Views     XR Scapula Right     XR Scapula Left   11.  Acquired hypothyroidism  E03.9 Thyrotropin GH   12. Hypokalemia  E87.6 Basic metabolic panel     Orders Placed This Encounter   Procedures     XR Thoracic Spine 3 Views     XR Scapula Right     XR Scapula Left     Lipid Profile     Basic Metabolic Panel     Hemoglobin A1c     Basic metabolic panel     PHYSICAL THERAPY REFERRAL       With regards to her back pain I think it is multifactorial including osteoarthritis, scoliosis, physical deconditioning, significant tightness to hip flexor and extensors, others.  I applauded her ability for lifestyle modification, weight loss.  Medications are reviewed, renewed.  Immunizations are up-to-date.    Plan   -- Expected clinical course discussed   -- Medications and their side effects discussed  Patient Instructions    -- Get the new shingles vaccine series from a nurse-only visit, pharmacy or [facility] ([facility] RN).     -- X-rays today   -- PT for back      Signed, Siddharth Kim MD, FAAP, FACP  Internal Medicine & Pediatrics

## 2020-06-29 NOTE — PATIENT INSTRUCTIONS
-- Get the new shingles vaccine series from a nurse-only visit, pharmacy or [facility] ([facility] RN).     -- X-rays today   -- PT for back

## 2020-08-06 DIAGNOSIS — E87.6 HYPOKALEMIA: ICD-10-CM

## 2020-08-06 LAB
ANION GAP SERPL CALCULATED.3IONS-SCNC: 10 MMOL/L (ref 3–14)
BUN SERPL-MCNC: 18 MG/DL (ref 7–25)
CALCIUM SERPL-MCNC: 8.8 MG/DL (ref 8.6–10.3)
CHLORIDE SERPL-SCNC: 99 MMOL/L (ref 98–107)
CO2 SERPL-SCNC: 33 MMOL/L (ref 21–31)
CREAT SERPL-MCNC: 0.97 MG/DL (ref 0.6–1.2)
GFR SERPL CREATININE-BSD FRML MDRD: 56 ML/MIN/{1.73_M2}
GLUCOSE SERPL-MCNC: 173 MG/DL (ref 70–105)
POTASSIUM SERPL-SCNC: 3.3 MMOL/L (ref 3.5–5.1)
SODIUM SERPL-SCNC: 142 MMOL/L (ref 134–144)

## 2020-08-06 PROCEDURE — 36415 COLL VENOUS BLD VENIPUNCTURE: CPT | Mod: ZL | Performed by: INTERNAL MEDICINE

## 2020-08-06 PROCEDURE — 80048 BASIC METABOLIC PNL TOTAL CA: CPT | Mod: ZL | Performed by: INTERNAL MEDICINE

## 2020-08-11 ENCOUNTER — HOSPITAL ENCOUNTER (OUTPATIENT)
Dept: PHYSICAL THERAPY | Facility: OTHER | Age: 80
Setting detail: THERAPIES SERIES
End: 2020-08-11
Attending: INTERNAL MEDICINE
Payer: MEDICARE

## 2020-08-11 DIAGNOSIS — M41.34 THORACOGENIC SCOLIOSIS OF THORACIC REGION: ICD-10-CM

## 2020-08-11 DIAGNOSIS — M46.1 SACROILIITIS (H): ICD-10-CM

## 2020-08-11 PROCEDURE — 97110 THERAPEUTIC EXERCISES: CPT | Mod: GP

## 2020-08-11 PROCEDURE — 97161 PT EVAL LOW COMPLEX 20 MIN: CPT | Mod: GP

## 2020-08-12 ENCOUNTER — TELEPHONE (OUTPATIENT)
Dept: PEDIATRICS | Facility: OTHER | Age: 80
End: 2020-08-12

## 2020-08-12 DIAGNOSIS — E87.6 DRUG-INDUCED HYPOKALEMIA: ICD-10-CM

## 2020-08-12 DIAGNOSIS — R21 RASH AND NONSPECIFIC SKIN ERUPTION: ICD-10-CM

## 2020-08-12 DIAGNOSIS — T50.905A DRUG-INDUCED HYPOKALEMIA: ICD-10-CM

## 2020-08-12 NOTE — TELEPHONE ENCOUNTER
Spoke with patient. [facility] pharmacy never received rx for klor-con. Looks like it was sent to pharmacy on 6-29-20 She also needs refill of her Lidex cream.   Love Nazario LPN.........................8/12/2020  2:04 PM

## 2020-08-13 RX ORDER — FLUOCINONIDE 0.5 MG/G
CREAM TOPICAL
Qty: 30 G | Refills: 11 | Status: SHIPPED | OUTPATIENT
Start: 2020-08-13 | End: 2021-11-18

## 2020-08-13 RX ORDER — POTASSIUM CHLORIDE 750 MG/1
20 TABLET, EXTENDED RELEASE ORAL DAILY
Qty: 180 TABLET | Refills: 3 | Status: SHIPPED | OUTPATIENT
Start: 2020-08-13 | End: 2021-07-05

## 2020-08-13 NOTE — TELEPHONE ENCOUNTER
After verifying patient last name and date of birth, patient was given the information about her medications.   Celeste Gan on 8/13/2020 at 1:11 PM

## 2020-08-13 NOTE — PROGRESS NOTES
[facility]          OUTPATIENT PHYSICAL THERAPY ORTHOPEDIC EVALUATION  PLAN OF TREATMENT FOR OUTPATIENT REHABILITATION  (COMPLETE FOR INITIAL CLAIMS ONLY)  Patient's Last Name, First Name, M.I.  YOB: 1940  Madiha Beltran    Provider s Name:  [facility]   Medical Record No.  [id number]   Start of Care Date:  08/11/20   Onset Date:   Chronic   Type:     _X__PT   ___OT   ___SLP Medical Diagnosis:  Thoracogenic scoliosis of the thoracic region; sacroilitis     PT Diagnosis:  midline thoracic back pain   Visits from SOC:  1      _________________________________________________________________________________  Plan of Treatment/Functional Goals:  ROM, stretching, strengthening, neuromuscular re-education, joint mobilization, manual therapy     Ultrasound, Hot packs, Electrical stimulation, Cryotherapy     Goals  Goal Identifier: Strength  Goal Description: Pt will demonstrate improved strength of scapular muscles evidenced by improved scapular positioning/retraction.  Target Date: 09/24/20    Goal Identifier: Pain  Goal Description: Pt will self report a decreased frequency of back pain.  Target Date: 09/24/20    Goal Identifier: Cervical ROM  Goal Description: Pt will demonstrate improved ROM of neck, +10 degrees in all motions, to allow for increase ease of driving and other tasks.  Target Date: 09/24/20    Therapy Frequency:  1-2  times/week  Predicted Duration of Therapy Intervention:  1-2 months    Rao Luciano PT                 I CERTIFY THE NEED FOR THESE SERVICES FURNISHED UNDER        THIS PLAN OF TREATMENT AND WHILE UNDER MY CARE     (Physician co-signature of this document indicates review and certification of the therapy plan).                       Certification Date From:  08/11/20   Certification Date To:  09/24/20    Referring Provider:  Dr. Kim    Initial Assessment        See Epic Evaluation Start of Care Date: 08/11/20

## 2020-08-13 NOTE — PROGRESS NOTES
"   08/11/20 1600   General Information   Type of Visit Initial OP Ortho PT Evaluation   Start of Care Date 08/11/20   Referring Physician Dr. Kim   Orders Evaluate and Treat   Date of Order 06/29/20   Certification Required? Yes   Medical Diagnosis Thoracogenic scoliosis of thoracic region, sacroilitis   Surgical/Medical history reviewed Yes   Precautions/Limitations no known precautions/limitations   General Information Comments Pt is a 80-year old woman that presents to therapy for occasional back pain. She notes that she isnt in pain all the time and has gotten pretty good at identifing when her pain is going to come on. She does state that vacuuming for 20-30 minutes can bring on her pain but she mostly attributes it to eating. She avoids eating spicy foods but also has to be very careful with the amount she eats. Pt notes she has a hiatal hernia that she has had for quite a while and feels \"it is what it is, its too late to do anything about it\". Pt does have long history of scoliosis that she has had since she was a young girl but it never really gave her any trouble. She also has a significant kyphotic curve of her upper Thoracic and cervical spine.   Body Part(s)   Body Part(s) Lumbar Spine/SI;Cervical Spine   Presentation and Etiology   Impairments E. Decreased flexibility;D. Decreased ROM;A. Pain   Functional Limitations perform activities of daily living   Symptom Location midline thoracic back pain   How/Where did it occur From insidious onset   Chronicity Chronic   Pain rating (0-10 point scale) Worst (/10)   Worst (/10) 3   Pain quality B. Dull;C. Aching   Frequency of pain/symptoms D. Other   Pain frequency comment occurs with diet issues mostly   Pain/symptoms are: Worse during the day   Pain/symptoms eased by E. Changing positions;F. Certain positions   Current / Previous Interventions   Diagnostic Tests: X-ray   X-ray Results Results   X-ray results 26 degrees of rightward thoracolumbar " scoliosis measured between T7 and L 1   Prior Level of Function   Prior Level of Function-Mobility independent   Prior Level of Function-ADLs independent   Current Level of Function   Patient role/employment history F. Retired   Living environment House/townhome   Current equipment-Gait/Locomotion None   Current equipment-ADL None   Fall Risk Screen   Fall screen completed by PT   Is patient a fall risk? No   Lumbar Spine/SI Objective Findings   Observation no acute distress   Posture significant kyphotic posture. R rib cage prominent prosteriorly. Forward head and protracted shoulders.    Gait/Locomotion unremarkable   Right Side Bending ROM decreased   Lumbar ROM Comment Pt has decrease R rotation and R side bending likely secondary to scoliosis. None of these movements reproduced any pain that pt has been describing.   Pelvic Screen negative   Hip Screen negative   Neurological Testing Comments no neurological symptoms   Cervical Spine   Cervical Flexion ROM WFLS   Cervical Extension ROM WFLs   Cervical Right Side Bending ROM limited   Cervical Left Side Bending ROM limited   Cervical Right Rotation ROM limited   Cervical Left Rotation ROM limited   Cervical/Thoracic/Shoulder ROM Comments impaired rotation to the right with thoracic spine   Shoulder Shrug (C2-C4) Strength 4   Shoulder Abd (C5) Strength 4   Shoulder ER (C5, C6) Strength 4   Shoulder IR (C5, C6) Strength 4+   Elbow Flexion (C5, C6) Strength 5   Elbow Extension (C7) Strength 5   Shoulder/Wrist/Hand Strength Comments Pt has good UE strength and UE use did not reproduce sypmtoms   Upper Trapezius Flexibility impaired B   Levator Scapula Flexibility impaired B   Cervical/Shoulder Special Tests Comments special tests not indicated   Palpation decreased muscle mass on R sided rhomboids and scapular musculature   Dermatome/Sensory Testing negative   Planned Therapy Interventions   Planned Therapy Interventions ROM;stretching;strengthening;neuromuscular  re-education;joint mobilization;manual therapy   Planned Modality Interventions   Planned Modality Interventions Ultrasound;Hot packs;Electrical stimulation;Cryotherapy   Clinical Impression   Criteria for Skilled Therapeutic Interventions Met yes, treatment indicated   PT Diagnosis midline thoracic back pain   Influenced by the following impairments weakness, ROM deficits, visceral involvement   Functional limitations due to impairments impaired ADLs,   Clinical Presentation Stable/Uncomplicated   Clinical Decision Making (Complexity) Low complexity   Therapy Frequency 1 time/week   Predicted Duration of Therapy Intervention (days/wks) 1-2 months   Risk & Benefits of therapy have been explained Yes   Patient, Family & other staff in agreement with plan of care Yes   Clinical Impression Comments Pt presents to therapy with ROM and posture impairments that would benefit from skilled rehab services. Pt's pain also seems to be related to GI involvement which also would benefit from physical therapy.   ORTHO GOALS   PT Ortho Eval Goals 1;2;3   Ortho Goal 1   Goal Identifier Strength   Goal Description Pt will demonstrate improved strength of scapular muscles evidenced by improved scapular positioning/retraction.   Target Date 09/24/20   Ortho Goal 2   Goal Identifier Pain   Goal Description Pt will self report a decreased frequency of back pain.   Target Date 09/24/20   Ortho Goal 3   Goal Identifier Cervical ROM   Goal Description Pt will demonstrate improved ROM of neck, +10 degrees in all motions, to allow for increase ease of driving and other tasks.   Target Date 09/24/20   Total Evaluation Time   PT Eval, Low Complexity Minutes (97902) 30   Therapy Certification   Certification date from 08/11/20   Certification date to 09/24/20   Medical Diagnosis Thoracogenic scoliosis of the thoracic region; sacroilitis

## 2020-08-18 ENCOUNTER — HOSPITAL ENCOUNTER (OUTPATIENT)
Dept: PHYSICAL THERAPY | Facility: OTHER | Age: 80
Setting detail: THERAPIES SERIES
End: 2020-08-18
Attending: INTERNAL MEDICINE
Payer: MEDICARE

## 2020-08-18 PROCEDURE — 97112 NEUROMUSCULAR REEDUCATION: CPT | Mod: GP

## 2020-08-18 PROCEDURE — 97140 MANUAL THERAPY 1/> REGIONS: CPT | Mod: GP

## 2020-08-27 ENCOUNTER — HOSPITAL ENCOUNTER (OUTPATIENT)
Dept: PHYSICAL THERAPY | Facility: OTHER | Age: 80
Setting detail: THERAPIES SERIES
End: 2020-08-27
Attending: INTERNAL MEDICINE
Payer: MEDICARE

## 2020-08-27 PROCEDURE — 97140 MANUAL THERAPY 1/> REGIONS: CPT | Mod: GP

## 2020-08-27 PROCEDURE — 97112 NEUROMUSCULAR REEDUCATION: CPT | Mod: GP

## 2020-08-31 ENCOUNTER — HOSPITAL ENCOUNTER (OUTPATIENT)
Dept: PHYSICAL THERAPY | Facility: OTHER | Age: 80
Setting detail: THERAPIES SERIES
End: 2020-08-31
Attending: INTERNAL MEDICINE
Payer: MEDICARE

## 2020-08-31 PROCEDURE — 97110 THERAPEUTIC EXERCISES: CPT | Mod: GP

## 2020-08-31 PROCEDURE — 97140 MANUAL THERAPY 1/> REGIONS: CPT | Mod: GP

## 2020-09-07 DIAGNOSIS — E03.9 ACQUIRED HYPOTHYROIDISM: ICD-10-CM

## 2020-09-07 DIAGNOSIS — K44.9 HIATAL HERNIA: ICD-10-CM

## 2020-09-07 DIAGNOSIS — K21.9 GASTROESOPHAGEAL REFLUX DISEASE, ESOPHAGITIS PRESENCE NOT SPECIFIED: ICD-10-CM

## 2020-09-07 DIAGNOSIS — K22.4 ESOPHAGEAL DYSMOTILITY: ICD-10-CM

## 2020-09-09 RX ORDER — LEVOTHYROXINE SODIUM 125 UG/1
TABLET ORAL
Qty: 90 TABLET | Refills: 2 | Status: SHIPPED | OUTPATIENT
Start: 2020-09-09 | End: 2021-04-21

## 2020-09-09 NOTE — TELEPHONE ENCOUNTER
"[facility] for the following:   omeprazole (PRILOSEC) 20 MG DR capsule   Sig:TAKE 1 CAPSULE DAILY    Last Prescription Date:   12/02/2019  Last Fill Qty/Refills:         90, R-2     PPI Protocol Passed -         Passed - Not on Clopidogrel (unless Pantoprazole ordered)        Passed - No diagnosis of osteoporosis on record        Passed - Recent (12 mo) or future (30 days) visit within the authorizing provider's specialty     Patient has had an office visit with the authorizing provider or a provider within the authorizing providers department within the previous 12 mos or has a future within next 30 days. See \"Patient Info\" tab in inbasket, or \"Choose Columns\" in Meds & Orders section of the refill encounter.              Passed - Medication is active on med list        Passed - Patient is age 18 or older        Passed - No active pregnacy on record        Passed - No positive pregnancy test in past 12 months     levothyroxine (SYNTHROID/LEVOTHROID) 125 MCG tablet  Sig:TAKE 1 TABLET DAILY    Last Prescription Date:   04/20/2020  Last Fill Qty/Refills:         90, R-1    Last Office Visit:              06/29/2020   Future Office visit:           None noted      Thyroid Protocol Passed -         Passed - Patient is 12 years or older        Passed - Recent (12 mo) or future (30 days) visit within the authorizing provider's specialty     Patient has had an office visit with the authorizing provider or a provider within the authorizing providers department within the previous 12 mos or has a future within next 30 days. See \"Patient Info\" tab in inbasket, or \"Choose Columns\" in Meds & Orders section of the refill encounter.              Passed - Medication is active on med list        Passed - Normal TSH on file in past 12 months     No lab results found.           Passed - No active pregnancy on record     If patient is pregnant or has had a positive pregnancy test, please check " TSH.          Passed - No positive pregnancy test in past 12 months     If patient is pregnant or has had a positive pregnancy test, please check TSH.             Prescription approved per [facility] [place] [place] Refill Protocol.  Leidy Xavier RN ....................  9/9/2020   1:40 PM

## 2020-09-28 NOTE — PROGRESS NOTES
Outpatient Physical Therapy Discharge Note     Patient: Madiha Beltran  : 1940    Beginning/End Dates of Reporting Period:  2020 to 2020    Referring Provider: Siddharth Kim MD    Therapy Diagnosis: midline thoracic back pain     Client Self Report: Madiha was last seen in PT on 2020. At that time, she felt she could continue with her home program. She was doing well managing her pain with exercise, modifying activity and diet. She has not contacted the department to schedule further PT. Attempted to contact her by telephone last week: left message for her to return call if she had questions or concerns. Subjective report on 2020 as follows:    Notes she is ok. Notes that there are really no changes in her back--pain is very intermittent and she is able to manage it well. Notes that her exercises are going well.    Objective Measurements: Current status unknown. Clinical findings on 2020 as follows:    Objective Measure: postural loading  Details: limited loading through left and right shoulder, ,mild R/R at the pelvis; general listening to upper middle abdomen; local listening to stomach, lesser omentum  Objective Measure: myofascial  Details: Mild resriction in the lesser omentum, fascia of toldt, gastrophrenic ligament  Objective Measure: joint mobility  Details: FRS right T9, R T4        Goals:  Goal Identifier Strength   Goal Description Pt will demonstrate improved strength of scapular muscles evidenced by improved scapular positioning/retraction.   Target Date 20   Date Met      Progress:     Goal Identifier Pain   Goal Description Pt will self report a decreased frequency of back pain.   Target Date 20   Date Met      Progress:     Goal Identifier Cervical ROM   Goal Description Pt will demonstrate improved ROM of neck, +10 degrees in all motions, to allow for increase ease of driving and other tasks.   Target Date 20   Date Met      Progress:  "        Progress Toward Goals:   Status unknown as pt has not returned in 4 weeks. Was doing well with pain management, noted good compliance with HEP at time of last session.    Home Program: UT stretch 3 x 30\" B, Levator stretch 3x30\" B, R trunk side bend x 10, Scap retractions (low row) with red band x 10, scap depressions with red band x 10, Pec stretch 2 x20\" B, Supine LTR, Supine thoracic extension stretch, seated thoracic flexion and rotation, Supine QL stretch, Supine Fijian exercise--UE, LE, no towel, seated hip hike with shoulder abduction      Plan:  Discharge from therapy.    Discharge:    Reason for Discharge: Patient has failed to schedule further appointments.    Equipment Issued: [employer]    Discharge Plan: Patient to continue home program.    Jennifer Montesinos, PT on 9/28/2020 at 7:44 AM   "

## 2020-09-29 ENCOUNTER — HOSPITAL ENCOUNTER (OUTPATIENT)
Dept: MAMMOGRAPHY | Facility: OTHER | Age: 80
Discharge: HOME | End: 2020-09-29
Attending: INTERNAL MEDICINE | Admitting: INTERNAL MEDICINE
Payer: MEDICARE

## 2020-09-29 DIAGNOSIS — Z12.31 VISIT FOR SCREENING MAMMOGRAM: ICD-10-CM

## 2020-09-29 PROCEDURE — 77063 BREAST TOMOSYNTHESIS BI: CPT

## 2020-10-15 ENCOUNTER — ALLIED HEALTH/NURSE VISIT (OUTPATIENT)
Dept: FAMILY MEDICINE | Facility: OTHER | Age: 80
End: 2020-10-15
Attending: INTERNAL MEDICINE
Payer: MEDICARE

## 2020-10-15 DIAGNOSIS — Z23 NEED FOR PROPHYLACTIC VACCINATION AND INOCULATION AGAINST INFLUENZA: Primary | ICD-10-CM

## 2020-10-15 PROCEDURE — G0008 ADMIN INFLUENZA VIRUS VAC: HCPCS

## 2020-10-15 PROCEDURE — C9803 HOPD COVID-19 SPEC COLLECT: HCPCS

## 2020-10-15 PROCEDURE — 90662 IIV NO PRSV INCREASED AG IM: CPT

## 2021-02-09 ENCOUNTER — IMMUNIZATION (OUTPATIENT)
Dept: FAMILY MEDICINE | Facility: OTHER | Age: 81
End: 2021-02-09
Attending: INTERNAL MEDICINE
Payer: MEDICARE

## 2021-02-09 PROCEDURE — 91300 PR COVID VAC PFIZER DIL RECON 30 MCG/0.3 ML IM: CPT

## 2021-03-02 ENCOUNTER — IMMUNIZATION (OUTPATIENT)
Dept: FAMILY MEDICINE | Facility: OTHER | Age: 81
End: 2021-03-02
Attending: FAMILY MEDICINE
Payer: MEDICARE

## 2021-03-02 PROCEDURE — 91300 PR COVID VAC PFIZER DIL RECON 30 MCG/0.3 ML IM: CPT

## 2021-04-20 DIAGNOSIS — E03.9 ACQUIRED HYPOTHYROIDISM: ICD-10-CM

## 2021-04-21 RX ORDER — LEVOTHYROXINE SODIUM 125 UG/1
TABLET ORAL
Qty: 90 TABLET | Refills: 0 | Status: SHIPPED | OUTPATIENT
Start: 2021-04-21 | End: 2021-04-22

## 2021-04-21 NOTE — TELEPHONE ENCOUNTER
"Requested Prescriptions   Pending Prescriptions Disp Refills     levothyroxine (SYNTHROID/LEVOTHROID) 125 MCG tablet [Pharmacy Med Name: LEVOTHYROXIN TAB 0.125MG] 90 tablet 2     Sig: TAKE 1 TABLET DAILY       Thyroid Protocol Passed - 4/20/2021  7:11 PM        Passed - Patient is 12 years or older        Passed - Recent (12 mo) or future (30 days) visit within the authorizing provider's specialty     Patient has had an office visit with the authorizing provider or a provider within the authorizing providers department within the previous 12 mos or has a future within next 30 days. See \"Patient Info\" tab in inbasket, or \"Choose Columns\" in Meds & Orders section of the refill encounter.              Passed - Medication is active on med list        Passed - Normal TSH on file in past 12 months     No lab results found.           Passed - No active pregnancy on record     If patient is pregnant or has had a positive pregnancy test, please check TSH.          Passed - No positive pregnancy test in past 12 months     If patient is pregnant or has had a positive pregnancy test, please check TSH.             LOV 6/29/20 Julio  Prescription approved per [facility] Refill Protocol.  Brenda J. Goodell, RN on 4/21/2021 at 9:58 AM    "

## 2021-04-22 RX ORDER — LEVOTHYROXINE SODIUM 125 UG/1
125 TABLET ORAL DAILY
Qty: 90 TABLET | Refills: 0 | Status: SHIPPED | OUTPATIENT
Start: 2021-04-22 | End: 2021-07-05

## 2021-04-22 NOTE — TELEPHONE ENCOUNTER
levothyroxine (SYNTHROID/LEVOTHROID) 125 MCG tablet 90 tablet 0 4/21/2021  No   Sig: TAKE 1 TABLET DAILY   Sent to pharmacy as: Levothyroxine Sodium 125 MCG Oral Tablet (SYNTHROID/LEVOTHROID)   Class: E-Prescribe   Order: [id number]   E-Prescribing Status: Transmission to pharmacy failed (4/21/2021 11:35 AM CDT)     [facility] - [place], [place] - [facility] - [place] [address] AT [address] TO [facility]       Prescribing Provider's NPI: [id number]  Siddharth Kim    New prescription sent to pharmacy, per above written order.     E-Prescribing Status: Receipt confirmed by pharmacy (4/22/2021  8:10 AM CDT)    Joycelyn Macario RN .............. 4/22/2021  8:10 AM

## 2021-04-25 ENCOUNTER — HEALTH MAINTENANCE LETTER (OUTPATIENT)
Age: 81
End: 2021-04-25

## 2021-06-02 DIAGNOSIS — K22.4 ESOPHAGEAL DYSMOTILITY: ICD-10-CM

## 2021-06-02 DIAGNOSIS — K21.9 GASTROESOPHAGEAL REFLUX DISEASE: ICD-10-CM

## 2021-06-02 DIAGNOSIS — K44.9 HIATAL HERNIA: ICD-10-CM

## 2021-06-03 NOTE — TELEPHONE ENCOUNTER
Prescription approved per [facility] Refill Protocol.  LOV: 6/29/2020    Cintia Lyles RN on 6/3/2021 at 11:08 AM

## 2021-07-05 ENCOUNTER — OFFICE VISIT (OUTPATIENT)
Dept: PEDIATRICS | Facility: OTHER | Age: 81
End: 2021-07-05
Attending: INTERNAL MEDICINE
Payer: MEDICARE

## 2021-07-05 VITALS
HEIGHT: 64 IN | RESPIRATION RATE: 14 BRPM | BODY MASS INDEX: 30.17 KG/M2 | HEART RATE: 62 BPM | SYSTOLIC BLOOD PRESSURE: 118 MMHG | TEMPERATURE: 97.4 F | WEIGHT: 176.7 LBS | OXYGEN SATURATION: 94 % | DIASTOLIC BLOOD PRESSURE: 80 MMHG

## 2021-07-05 DIAGNOSIS — K44.9 HIATAL HERNIA: ICD-10-CM

## 2021-07-05 DIAGNOSIS — T50.905A DRUG-INDUCED HYPOKALEMIA: ICD-10-CM

## 2021-07-05 DIAGNOSIS — R20.2 TINGLING OF UPPER EXTREMITY: ICD-10-CM

## 2021-07-05 DIAGNOSIS — G89.29 CHRONIC MIDLINE THORACIC BACK PAIN: ICD-10-CM

## 2021-07-05 DIAGNOSIS — E87.6 DRUG-INDUCED HYPOKALEMIA: ICD-10-CM

## 2021-07-05 DIAGNOSIS — K29.60 GASTRITIS, BILE ACID REFLUX: ICD-10-CM

## 2021-07-05 DIAGNOSIS — K22.4 ESOPHAGEAL DYSMOTILITY: ICD-10-CM

## 2021-07-05 DIAGNOSIS — N39.41 URGE INCONTINENCE: ICD-10-CM

## 2021-07-05 DIAGNOSIS — E78.2 MIXED HYPERLIPIDEMIA: Chronic | ICD-10-CM

## 2021-07-05 DIAGNOSIS — E03.9 ACQUIRED HYPOTHYROIDISM: ICD-10-CM

## 2021-07-05 DIAGNOSIS — R73.03 PREDIABETES: Chronic | ICD-10-CM

## 2021-07-05 DIAGNOSIS — M15.0 PRIMARY OSTEOARTHRITIS INVOLVING MULTIPLE JOINTS: ICD-10-CM

## 2021-07-05 DIAGNOSIS — Z00.00 ENCOUNTER FOR MEDICARE ANNUAL WELLNESS EXAM: Primary | ICD-10-CM

## 2021-07-05 DIAGNOSIS — F39 MOOD DISORDER (H): ICD-10-CM

## 2021-07-05 DIAGNOSIS — M54.6 CHRONIC MIDLINE THORACIC BACK PAIN: ICD-10-CM

## 2021-07-05 DIAGNOSIS — I10 ESSENTIAL HYPERTENSION: Chronic | ICD-10-CM

## 2021-07-05 DIAGNOSIS — K64.8 INTERNAL HEMORRHOID: ICD-10-CM

## 2021-07-05 DIAGNOSIS — K21.9 GASTROESOPHAGEAL REFLUX DISEASE, UNSPECIFIED WHETHER ESOPHAGITIS PRESENT: ICD-10-CM

## 2021-07-05 DIAGNOSIS — R05.3 CHRONIC COUGH: ICD-10-CM

## 2021-07-05 LAB
ANION GAP SERPL CALCULATED.3IONS-SCNC: 8 MMOL/L (ref 3–14)
BUN SERPL-MCNC: 16 MG/DL (ref 7–25)
CALCIUM SERPL-MCNC: 9 MG/DL (ref 8.6–10.3)
CHLORIDE SERPL-SCNC: 99 MMOL/L (ref 98–107)
CHOLEST SERPL-MCNC: 201 MG/DL
CO2 SERPL-SCNC: 34 MMOL/L (ref 21–31)
CREAT SERPL-MCNC: 0.89 MG/DL (ref 0.6–1.2)
ERYTHROCYTE [DISTWIDTH] IN BLOOD BY AUTOMATED COUNT: 12.1 % (ref 10–15)
GFR SERPL CREATININE-BSD FRML MDRD: 61 ML/MIN/{1.73_M2}
GLUCOSE SERPL-MCNC: 112 MG/DL (ref 70–105)
HBA1C MFR BLD: 5.8 % (ref 4–6)
HCT VFR BLD AUTO: 46.7 % (ref 35–47)
HDLC SERPL-MCNC: 48 MG/DL (ref 23–92)
HGB BLD-MCNC: 15.4 G/DL (ref 11.7–15.7)
LDLC SERPL CALC-MCNC: 109 MG/DL
MCH RBC QN AUTO: 31.1 PG (ref 26.5–33)
MCHC RBC AUTO-ENTMCNC: 33 G/DL (ref 31.5–36.5)
MCV RBC AUTO: 94 FL (ref 78–100)
NONHDLC SERPL-MCNC: 153 MG/DL
PLATELET # BLD AUTO: 242 10E9/L (ref 150–450)
POTASSIUM SERPL-SCNC: 3.5 MMOL/L (ref 3.5–5.1)
RBC # BLD AUTO: 4.95 10E12/L (ref 3.8–5.2)
SODIUM SERPL-SCNC: 141 MMOL/L (ref 134–144)
TRIGL SERPL-MCNC: 221 MG/DL
TSH SERPL DL<=0.05 MIU/L-ACNC: 2.15 IU/ML (ref 0.34–5.6)
WBC # BLD AUTO: 6.9 10E9/L (ref 4–11)

## 2021-07-05 PROCEDURE — 85027 COMPLETE CBC AUTOMATED: CPT | Mod: ZL | Performed by: INTERNAL MEDICINE

## 2021-07-05 PROCEDURE — G0439 PPPS, SUBSEQ VISIT: HCPCS | Performed by: INTERNAL MEDICINE

## 2021-07-05 PROCEDURE — 80061 LIPID PANEL: CPT | Mod: ZL | Performed by: INTERNAL MEDICINE

## 2021-07-05 PROCEDURE — 36415 COLL VENOUS BLD VENIPUNCTURE: CPT | Mod: ZL | Performed by: INTERNAL MEDICINE

## 2021-07-05 PROCEDURE — 99213 OFFICE O/P EST LOW 20 MIN: CPT | Mod: 25 | Performed by: INTERNAL MEDICINE

## 2021-07-05 PROCEDURE — 80048 BASIC METABOLIC PNL TOTAL CA: CPT | Mod: ZL | Performed by: INTERNAL MEDICINE

## 2021-07-05 PROCEDURE — G0463 HOSPITAL OUTPT CLINIC VISIT: HCPCS | Mod: 25

## 2021-07-05 PROCEDURE — G0009 ADMIN PNEUMOCOCCAL VACCINE: HCPCS

## 2021-07-05 PROCEDURE — 84443 ASSAY THYROID STIM HORMONE: CPT | Mod: ZL | Performed by: INTERNAL MEDICINE

## 2021-07-05 PROCEDURE — 83036 HEMOGLOBIN GLYCOSYLATED A1C: CPT | Mod: ZL | Performed by: INTERNAL MEDICINE

## 2021-07-05 RX ORDER — LEVOTHYROXINE SODIUM 125 UG/1
125 TABLET ORAL DAILY
Qty: 90 TABLET | Refills: 3 | Status: SHIPPED | OUTPATIENT
Start: 2021-07-05 | End: 2022-03-07

## 2021-07-05 RX ORDER — METOPROLOL TARTRATE 100 MG
100 TABLET ORAL 2 TIMES DAILY
Qty: 180 TABLET | Refills: 3 | Status: SHIPPED | OUTPATIENT
Start: 2021-07-05 | End: 2022-03-07

## 2021-07-05 RX ORDER — CELECOXIB 100 MG/1
100 CAPSULE ORAL 2 TIMES DAILY
Qty: 180 CAPSULE | Refills: 3 | Status: SHIPPED | OUTPATIENT
Start: 2021-07-05 | End: 2022-03-07

## 2021-07-05 RX ORDER — ATORVASTATIN CALCIUM 40 MG/1
40 TABLET, FILM COATED ORAL DAILY
Qty: 90 TABLET | Refills: 3 | Status: SHIPPED | OUTPATIENT
Start: 2021-07-05 | End: 2022-03-07

## 2021-07-05 RX ORDER — ZOSTER VACCINE RECOMBINANT, ADJUVANTED 50 MCG/0.5
1 KIT INTRAMUSCULAR ONCE
Qty: 0.5 ML | Refills: 1 | Status: SHIPPED | OUTPATIENT
Start: 2021-07-05 | End: 2021-07-05

## 2021-07-05 RX ORDER — SUCRALFATE 1 G/1
1 TABLET ORAL
Qty: 270 TABLET | Refills: 3 | Status: SHIPPED | OUTPATIENT
Start: 2021-07-05 | End: 2022-03-07

## 2021-07-05 RX ORDER — POTASSIUM CHLORIDE 750 MG/1
20 TABLET, EXTENDED RELEASE ORAL DAILY
Qty: 180 TABLET | Refills: 3 | Status: SHIPPED | OUTPATIENT
Start: 2021-07-05 | End: 2022-03-07

## 2021-07-05 RX ORDER — HYDROCHLOROTHIAZIDE 25 MG/1
25 TABLET ORAL DAILY
Qty: 90 TABLET | Refills: 3 | Status: SHIPPED | OUTPATIENT
Start: 2021-07-05 | End: 2022-03-07

## 2021-07-05 RX ORDER — OXYBUTYNIN CHLORIDE 5 MG/1
5 TABLET ORAL DAILY
Qty: 90 TABLET | Refills: 3 | Status: SHIPPED | OUTPATIENT
Start: 2021-07-05 | End: 2022-03-07

## 2021-07-05 ASSESSMENT — PAIN SCALES - GENERAL: PAINLEVEL: NO PAIN (0)

## 2021-07-05 ASSESSMENT — MIFFLIN-ST. JEOR: SCORE: 1261.51

## 2021-07-05 NOTE — NURSING NOTE
"Chief Complaint   Patient presents with     Medicare Visit     annual wellness      Patient presents to clinic today for annual medicare wellness visit.     Initial /80 (BP Location: Right arm, Patient Position: Sitting, Cuff Size: Adult Regular)   Pulse 62   Temp 97.4  F (36.3  C) (Tympanic)   Resp 14   Ht 1.626 m (5' 4\")   Wt 80.2 kg (176 lb 11.2 oz)   LMP  (LMP Unknown)   SpO2 94%   BMI 30.33 kg/m   Estimated body mass index is 30.33 kg/m  as calculated from the following:    Height as of this encounter: 1.626 m (5' 4\").    Weight as of this encounter: 80.2 kg (176 lb 11.2 oz).     FOOD SECURITY SCREENING QUESTIONS  Hunger Vital Signs:  Within the past 12 months we worried whether our food would run out before we got money to buy more. Never  Within the past 12 months the food we bought just didn't last and we didn't have money to get more. Never      Medication Reconciliation: Complete      Erica Sanches LPN   "

## 2021-07-05 NOTE — PATIENT INSTRUCTIONS
Patient Education   Personalized Prevention Plan  You are due for the preventive services outlined below.  Your care team is available to assist you in scheduling these services.  If you have already completed any of these items, please share that information with your care team to update in your medical record.  Health Maintenance Due   Topic Date Due     Depression Action Plan  Never done     Zoster (Shingles) Vaccine (2 of 3) 12/16/2009     Depression Assessment  12/29/2020     FALL RISK ASSESSMENT  06/29/2021        Patient Education   Personalized Prevention Plan  You are due for the preventive services outlined below.  Your care team is available to assist you in scheduling these services.  If you have already completed any of these items, please share that information with your care team to update in your medical record.  Health Maintenance Due   Topic Date Due     Depression Action Plan  Never done     Zoster (Shingles) Vaccine (2 of 3) 12/16/2009     Depression Assessment  12/29/2020     FALL RISK ASSESSMENT  06/29/2021

## 2021-07-05 NOTE — PROGRESS NOTES
"  SUBJECTIVE:   Madiha Beltran is a 81 year old female who presents for Preventive Visit.      Patient has been advised of split billing requirements and indicates understanding: Yes  Are you in the first 12 months of your Medicare Part B coverage?  No    Physical Health:    In general, how would you rate your overall physical health? good    Outside of work, how many days during the week do you exercise? 2-3 days/week    Outside of work, approximately how many minutes a day do you exercise?45-60 minutes    If you drink alcohol do you typically have >3 drinks per day or >7 drinks per week? No    Do you usually eat at least 4 servings of fruit and vegetables a day, include whole grains & fiber and avoid regularly eating high fat or \"junk\" foods? Yes    Do you have any problems taking medications regularly?  No    Do you have any side effects from medications? none    Needs assistance for the following daily activities: no assistance needed    Which of the following safety concerns are present in your home?  none identified     Hearing impairment: No    In the past 6 months, have you been bothered by leaking of urine? yes    Mental Health:    In general, how would you rate your overall mental or emotional health? good  PHQ-2 Score: 0    Do you feel safe in your environment? Yes    Have you ever done Advance Care Planning? (For example, a Health Directive, POLST, or a discussion with a medical provider or your loved ones about your wishes): Yes, patient states has an Advance Care Planning document and will bring a copy to the clinic.    Additional concerns to address?  YES     Fall risk:  Fallen 2 or more times in the past year?: No  Any fall with injury in the past year?: No  click delete button to remove this line now  Cognitive Screenin) Repeat 3 items (Leader, Season, Table)    2) Clock draw: NORMAL  3) 3 item recall: Recalls 3 objects  Results: 3 items recalled: COGNITIVE IMPAIRMENT LESS " LIKELY    Mini-CogTM Copyright S Brenden. Licensed by the author for use in [facility]; reprinted with permission (brian@[facility]). All rights reserved.      Do you have sleep apnea, excessive snoring or daytime drowsiness?: no        He also has the following issues to discuss today:  Her hands have been going to sleep.  She notices it with sitting and reading or doing sewing work.  No change with driving or looking over her shoulder.  No pain in the neck.  She has a chronic pain in the shoulder blade area.  Her cough is worse.  She worries she could have mesothelioma.  She always has a slightly low oxygen level.  No known exposure to asbestos.  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  She had a fall 2 years ago.  No recent fall or injury.  She has been noticing a bulge near the anus.  It happens with defecation.  It is not painful.  Sometimes she does not feel this bulge.  It is only on one side.    Reviewed and updated as needed this visit by clinical staff  Tobacco  Allergies  Meds  Problems  Med Hx  Surg Hx  Fam Hx  Soc Hx          Reviewed and updated as needed this visit by Provider                Social History     Tobacco Use     Smoking status: Never Smoker     Smokeless tobacco: Never Used   Substance Use Topics     Alcohol use: No                           Current providers sharing in care for this patient include:   Patient Care Team:  Siddharth Kim MD as PCP - General (Pediatrics)  Siddharth Kim MD as Assigned PCP    The following health maintenance items are reviewed in Epic and correct as of today:  Health Maintenance   Topic Date Due     DEPRESSION ACTION PLAN  Never done     ZOSTER IMMUNIZATION (2 of 3) 12/16/2009     MEDICARE ANNUAL WELLNESS VISIT  06/27/2019     PHQ-9  12/29/2020     FALL RISK ASSESSMENT  06/29/2021     INFLUENZA VACCINE (1) 09/01/2021     DTAP/TDAP/TD IMMUNIZATION (2 - Td) 06/07/2022     ADVANCE CARE  "PLANNING  06/27/2023     LIPID  06/29/2025     DEXA  09/01/2032     HEPATITIS C SCREENING  Completed     Pneumococcal Vaccine: 65+ Years  Completed     COVID-19 Vaccine  Completed     IPV IMMUNIZATION  Aged Out     MENINGITIS IMMUNIZATION  Aged Out     HEPATITIS B IMMUNIZATION  Aged Out     Labs reviewed in [facility]  Immunizations are up-to-date, recommend Shingrix  Mammogram up-to-date    ROS:  Constitutional, HEENT, cardiovascular, pulmonary, gi and gu systems are negative, except as otherwise noted.    OBJECTIVE:   /80 (BP Location: Right arm, Patient Position: Sitting, Cuff Size: Adult Regular)   Pulse 62   Temp 97.4  F (36.3  C) (Tympanic)   Resp 14   Ht 1.626 m (5' 4\")   Wt 80.2 kg (176 lb 11.2 oz)   LMP  (LMP Unknown)   SpO2 94%   BMI 30.33 kg/m   Estimated body mass index is 30.33 kg/m  as calculated from the following:    Height as of this encounter: 1.626 m (5' 4\").    Weight as of this encounter: 80.2 kg (176 lb 11.2 oz).  EXAM:   HEENT: No carotid bruits.  Cardiovascular: Regular rate and rhythm, no murmurs  Pulmonary: Clear  MSK: No edema    Diagnostic Test Results:  Labs reviewed in Epic    ASSESSMENT / PLAN:       ICD-10-CM    1. Encounter for Medicare annual wellness exam  Z00.00 zoster vaccine recombinant adjuvanted (SHINGRIX) injection   2. Mixed hyperlipidemia  E78.2 atorvastatin (LIPITOR) 40 MG tablet     Lipid Profile     Lipid Profile   3. Primary osteoarthritis involving multiple joints  M89.49 celecoxib (CELEBREX) 100 MG capsule   4. Mood disorder (H)  F39 FLUoxetine (PROZAC) 20 MG capsule   5. Essential hypertension  I10 hydrochlorothiazide (HYDRODIURIL) 25 MG tablet     metoprolol tartrate (LOPRESSOR) 100 MG tablet     Basic metabolic panel     Basic metabolic panel   6. Acquired hypothyroidism  E03.9 levothyroxine (SYNTHROID/LEVOTHROID) 125 MCG tablet     Thyrotropin GH     Thyrotropin GH   7. Gastroesophageal reflux disease, unspecified whether esophagitis present  K21.9 " omeprazole (PRILOSEC) 20 MG DR capsule   8. Esophageal dysmotility  K22.4 omeprazole (PRILOSEC) 20 MG DR capsule   9. Hiatal hernia  K44.9 omeprazole (PRILOSEC) 20 MG DR capsule     sucralfate (CARAFATE) 1 GM tablet   10. Urge incontinence  N39.41 oxybutynin (DITROPAN) 5 MG tablet   11. Drug-induced hypokalemia  E87.6 potassium chloride ER (KLOR-CON) 10 MEQ CR tablet    T50.905A    12. Tingling of upper extremity  R20.2 NEUROLOGY ADULT REFERRAL     Orthopedic  Referral     CBC with platelets     CBC with platelets   13. Prediabetes  R73.03 Hemoglobin A1c     Hemoglobin A1c   14. Gastritis, bile acid reflux  K29.60    15. Chronic cough  R05 CT Chest w/o Contrast   16. Chronic midline thoracic back pain  M54.6 CT Chest w/o Contrast    G89.29    17. Internal hemorrhoid  K64.8 GENERAL SURG ADULT REFERRAL     With regards to the tingling in the extremities I think the most likely etiology is carpal tunnel syndrome however differential diagnosis also includes cubital tunnel syndrome, cervical radiculopathy, others.  We discussed options and decided on EMG followed by orthopedics consultation.    With regards to her pain in the shoulder blade associated with coughing differential diagnosis includes gastroesophageal reflux disease with aspiration, thoracic aneurysm, occult malignancy, compression fracture, osteoarthritis with kyphosis, others.  We had a lengthy discussion today with regards to this differential.  We decided to obtain a CT scan as a neck step.    With regards to the bulge at the anus that is nonpainful most likely it is an internal hemorrhoid.  Recommend general surgery consultation to consider anoscopy.      Patient has been advised of split billing requirements and indicates understanding: Yes    COUNSELING:  Reviewed preventive health counseling, as reflected in patient instructions    Estimated body mass index is 30.33 kg/m  as calculated from the following:    Height as of this encounter: 1.626  "m (5' 4\").    Weight as of this encounter: 80.2 kg (176 lb 11.2 oz).    Weight management plan: Discussed healthy diet and exercise guidelines    She reports that she has never smoked. She has never used smokeless tobacco.    Appropriate preventive services were discussed with this patient, including applicable screening as appropriate for cardiovascular disease, diabetes, osteopenia/osteoporosis, and glaucoma.  As appropriate for age/gender, discussed screening for colorectal cancer, prostate cancer, breast cancer, and cervical cancer. Checklist reviewing preventive services available has been given to the patient.    Reviewed patients plan of care and provided an AVS. The Basic Care Plan (routine screening as documented in Health Maintenance) for Madiha meets the Care Plan requirement. This Care Plan has been established and reviewed with the Patient.    Counseling Resources:  ATP IV Guidelines  Pooled Cohorts Equation Calculator  Breast Cancer Risk Calculator  BRCA-Related Cancer Risk Assessment: FHS-7 Tool  FRAX Risk Assessment  ICSI Preventive Guidelines  Dietary Guidelines for Americans, 2010  USDA's MyPlate  ASA Prophylaxis  Lung CA Screening    Siddharth Kim MD  [facility]  "

## 2021-07-06 ENCOUNTER — OFFICE VISIT (OUTPATIENT)
Dept: SURGERY | Facility: OTHER | Age: 81
End: 2021-07-06
Attending: SURGERY
Payer: MEDICARE

## 2021-07-06 VITALS
OXYGEN SATURATION: 95 % | DIASTOLIC BLOOD PRESSURE: 82 MMHG | BODY MASS INDEX: 30.38 KG/M2 | RESPIRATION RATE: 14 BRPM | HEART RATE: 51 BPM | TEMPERATURE: 97.7 F | SYSTOLIC BLOOD PRESSURE: 130 MMHG | WEIGHT: 177 LBS

## 2021-07-06 DIAGNOSIS — K64.8 INTERNAL HEMORRHOID: Primary | ICD-10-CM

## 2021-07-06 PROCEDURE — 46600 DIAGNOSTIC ANOSCOPY SPX: CPT | Performed by: SURGERY

## 2021-07-06 PROCEDURE — G0463 HOSPITAL OUTPT CLINIC VISIT: HCPCS | Mod: 25

## 2021-07-06 PROCEDURE — 99202 OFFICE O/P NEW SF 15 MIN: CPT | Mod: 25 | Performed by: SURGERY

## 2021-07-06 PROCEDURE — G0463 HOSPITAL OUTPT CLINIC VISIT: HCPCS

## 2021-07-06 ASSESSMENT — PAIN SCALES - GENERAL: PAINLEVEL: NO PAIN (0)

## 2021-07-06 ASSESSMENT — PATIENT HEALTH QUESTIONNAIRE - PHQ9: SUM OF ALL RESPONSES TO PHQ QUESTIONS 1-9: 0

## 2021-07-06 NOTE — PROGRESS NOTES
"OFFICE CONSULTATION NOTE  Patient Name: Madiha Beltran  Address: [address]   [place] [address]  Age:81 year old  Sex: female     Primary Care Physician: Siddharth Kim    I wasrequested to see this patient in consultation by Siddharth Kim MD  for evaluation of anal bulge and discomfort. A copy of this note will be sent to Siddharth Kim MD.    HPI:   The patient is 81 year old female with anal bulge that has been present for awhile. The patient feels a bump at times after a bowel movement. There hasn't been any bleeding. There hasn't been any drainage. Bowel movements are soft to loose. No straining. The patient has a bowel movement daily at least. Patient stays active and drinks \"plenty\" of water. Last colonoscopy 2016 by me with no polyps. Photos reviewed-mild internal hemorrhoids noted at that time on images.    CONSULTATION ASSESSMENT AND PLAN/RECOMMENDATIONS:   Assessment: grade 1-2 internal hemorrhoids  Plan:   I discussed with the patient the pathophysiology of hemorrhoids. We discussed the risks, benefits and alternatives to anoscopy today in the office. he patient expressed understanding and wished to proceed. See below.  Continue with water intake to at least 64 oz a day-go slowly. Make sure to have high fiber in diet to 30 gms a day. Get 30 minutes of exercise a day. Try to find 30 minutes to focus on the exercise.  Discussed that if the internal hemorrhoids cause pain, bleeding or a more persistent lump-we could do a procedure in the office to help. At this time, no indication for banding or other hemorrhoid intervention.  The patient will call with questions or concerns.The patient denies questions at this time.    REVIEW OF SYSTEMS  GENERAL: No fevers or chills. Denies fatigue, recent weight loss.  HEENT: No sinus drainage. No changes with vision or hearing. No difficultyswallowing.   LYMPHATICS:  No swollen nodes in axilla, neck or " groin.  CARDIOVASCULAR: Denies chest pain, palpitations and dyspnea on exertion.  PULMONARY: No shortness of breath or cough. No increase in sputum production.  GI: Denies melena, bright red blood in stools. No hematemesis. No constipation.  : No dysuria or hematuria.  SKIN: No recent rashes or ulcers.   HEMATOLOGY:  No history of easy bruising or bleeding.  ENDOCRINE:  Has pre diabetes, takes thyroid medication.  NEUROLOGY:  No history of seizures or headaches. No motor changes. Has tingling of her hands at times.    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, unspecified     Benign myxoma, right knee     Chronic hepatitis (H)     Chronic hepatitis, mild, stable     Diaphragmatic hernia without obstruction or gangrene     No Comments Provided     Disease of stomach and duodenum     Peptic acid disease, esophageal reflux     Frequency of micturition     No Comments Provided     Gastro-esophageal reflux disease without esophagitis     No Comments Provided     Hypothyroidism     No Comments Provided     Major depressive disorder, recurrent, in full remission (H)     No Comments Provided     Malignant neoplasm of endometrium (H)     No Comments Provided     Nontoxic multinodular goiter     No Comments Provided     Other bursitis of hip, right hip     No Comments Provided     Other fecal abnormalities     No Comments Provided     Other seborrheic keratosis     No Comments Provided     Other specified disorders of bone density and structure, unspecified site (CODE)     No Comments Provided     Other specified disorders of veins     No Comments Provided     Prediabetes     No Comments Provided     Primary generalized (osteo)arthritis     No Comments Provided     Pure hypercholesterolemia     No Comments Provided     Pure hyperglyceridemia     No Comments Provided     Tear of right meniscus as current injury     Torn medial and lateral meniscus right knee with popliteal  cyst     Wheezing     Wheezing with possible angina     Past Surgical History:   Procedure Laterality Date     ARTHROPLASTY KNEE      6/2011,right knee arthroplasty     ARTHROPLASTY KNEE      07/2011, left knee arthroplasty     ARTHROSCOPY KNEE      Right knee surgery removal of myxoma     ARTHROSCOPY KNEE      1/16/01,Arthroscopy left knee and right knee.     CHOLECYSTECTOMY      No Comments Provided     COLONOSCOPY      1997,Colonoscopy - Next one due 01/07     COLONOSCOPY      7/31/06,Colonoscopy normal, colonoscopy due in 2016     COLONOSCOPY N/A     8/4/2016,wnl no need for fu     ESOPHAGOSCOPY, GASTROSCOPY, DUODENOSCOPY (EGD), COMBINED      No Comments Provided     ESOPHAGOSCOPY, GASTROSCOPY, DUODENOSCOPY (EGD), COMBINED      7/27/2017     FINGER SURGERY      05/07,Reconstruct left first metacarpocarpal joint     HYSTERECTOMY TOTAL ABDOMINAL, BILATERAL SALPINGO-OOPHORECTOMY, COMBINED      1/21/97,ILIANA/BSO for endometrial carcinoma     HYSTERECTOMY TOTAL ABDOMINAL, BILATERAL SALPINGO-OOPHORECTOMY, COMBINED      1999     LAPAROSCOPIC TUBAL LIGATION      No Comments Provided     LAPAROSCOPY DIAGNOSTIC (GENERAL)      No Comments Provided     OTHER SURGICAL HISTORY      06/08,,HERNIA REPAIR,Periumbilical incisional hernia, repaired     OTHER SURGICAL HISTORY      06/03,328166,HM DEXA SCAN,DXA normal     OTHER SURGICAL HISTORY      7/31/2014,12016.0,MT COLONOSCOPY BIOPSY SINGLE OR MULTIPLE     THYROIDECTOMY      09/2004,Bilateral thyroidectomy for Hashimoto's thyroiditis     Current Outpatient Medications   Medication     aspirin 81 MG chewable tablet     atorvastatin (LIPITOR) 40 MG tablet     celecoxib (CELEBREX) 100 MG capsule     clindamycin (CLEOCIN) 300 MG capsule     fluocinonide (LIDEX) 0.05 % external cream     FLUoxetine (PROZAC) 20 MG capsule     hydrochlorothiazide (HYDRODIURIL) 25 MG tablet     levothyroxine (SYNTHROID/LEVOTHROID) 125 MCG tablet     metoprolol tartrate (LOPRESSOR) 100 MG tablet      Multiple Vitamin (MULTI-VITAMINS) TABS     omeprazole (PRILOSEC) 20 MG DR capsule     oxybutynin (DITROPAN) 5 MG tablet     potassium chloride ER (KLOR-CON) 10 MEQ CR tablet     sucralfate (CARAFATE) 1 GM tablet     Ubiquinol 100 MG CAPS     No current facility-administered medications for this visit.      Allergies   Allergen Reactions     Bicillin C-R, Nausea     Became faint     Diatrizoate Hives     Sulfa Drugs Rash     Family History   Problem Relation Age of Onset     Hypertension Mother         Hypertension     Heart Disease Mother         Heart Disease,CHF     Hypertension Father         Hypertension     Heart Disease Father         Heart Disease,CHF     Other - See Comments Maternal Grandmother         Stroke     Hypertension Maternal Grandmother         Hypertension     Other - See Comments Maternal Grandfather         Stroke     Family History Negative Maternal Grandfather         Good Health,Emphysema     Breast Cancer Other 37        Cancer-breast,fast growing     Diabetes Brother         Diabetes     Family History Negative Sister         Good Health     Arthritis Brother         Arthritis,psoriatic arthritis     Other - See Comments Brother          from burst blood vessel, pneumonia on Enbrel     Breast Cancer Other         Cancer-breast,couple of aunts, youngest at mid 40s     Asthma No family hx of         Asthma     Rhinitis No family hx of         Allergic rhinitis      Social History     Socioeconomic History     Marital status:      Spouse name: None     Number of children: None     Years of education: None     Highest education level: None   Occupational History     None   Social Needs     Financial resource strain: None     Food insecurity     Worry: None     Inability: None     Transportation needs     Medical: None     Non-medical: None   Tobacco Use     Smoking status: Never Smoker     Smokeless tobacco: Never Used   Substance and Sexual Activity     Alcohol use: No     Drug  use: Never     Sexual activity: Not Currently   Lifestyle     Physical activity     Days per week: None     Minutes per session: None     Stress: None   Relationships     Social connections     Talks on phone: None     Gets together: None     Attends Sikhism service: None     Active member of club or organization: None     Attends meetings of clubs or organizations: None     Relationship status: None     Intimate partner violence     Fear of current or ex partner: None     Emotionally abused: None     Physically abused: None     Forced sexual activity: None   Other Topics Concern     Parent/sibling w/ CABG, MI or angioplasty before 65F 55M? Not Asked   Social History Narrative    , lives with  Janessa.   Moved into the apartments on [address]     The above history was reviewed today 7/6/2021  PHYSICAL EXAM  Vitals: /82 (BP Location: Right arm, Patient Position: Sitting, Cuff Size: Adult Regular)   Pulse 51   Temp 97.7  F (36.5  C) (Temporal)   Resp 14   Wt 80.3 kg (177 lb)   LMP  (LMP Unknown)   SpO2 95%   BMI 30.38 kg/m    BMI= Body mass index is 30.38 kg/m .  GENERAL: Healthy appearing patient in no acute distress. Pleasant and cooperative with exam and interview.   HEENT: Head-normocephalic. Eyes-no scleral icterus. Nose-no nasal drainage. No lesions. Mouth-oral mucosa pink and moist, no lesions.  NECK: Supple. No thyroid nodules. Trachea midline.  LYMPHATICS:  No cervical, axillary or supraclavicular adenopathy.  SKIN: Pink, warm and dry. No jaundice. No rash.  NEURO:  Cranial nerves II-XII grossly intact. Alert and oriented.  PSYCH: Appropriate mood and affect.  RECTAL: no external mass or erythema, no external hemorrhoids. No mass on anal exam, no bleeding, good sphincter tone.  ANOSCOPY: mild grade one internal hemorrhoids without inflammation or thrombosis. No tenderness. No mass. No prolapse change visualized.

## 2021-07-06 NOTE — PATIENT INSTRUCTIONS
Continue with current medications. Can use over the counter hemorrhoid cream if that hemorrhoid flares up and causes pain. High fiber, 30 minutes of exercise and maintaining good bowel habits will help to prevent the hemorrhoid from bothering you. Call if you have concerns.

## 2021-07-09 ENCOUNTER — HOSPITAL ENCOUNTER (OUTPATIENT)
Dept: CT IMAGING | Facility: OTHER | Age: 81
Discharge: HOME | End: 2021-07-09
Attending: INTERNAL MEDICINE | Admitting: INTERNAL MEDICINE
Payer: MEDICARE

## 2021-07-09 DIAGNOSIS — M54.6 CHRONIC MIDLINE THORACIC BACK PAIN: ICD-10-CM

## 2021-07-09 DIAGNOSIS — R05.3 CHRONIC COUGH: ICD-10-CM

## 2021-07-09 DIAGNOSIS — G89.29 CHRONIC MIDLINE THORACIC BACK PAIN: ICD-10-CM

## 2021-07-09 PROCEDURE — 71250 CT THORAX DX C-: CPT

## 2021-08-17 ENCOUNTER — OFFICE VISIT (OUTPATIENT)
Dept: NEUROLOGY | Facility: OTHER | Age: 81
End: 2021-08-17
Attending: PSYCHIATRY & NEUROLOGY
Payer: MEDICARE

## 2021-08-17 ENCOUNTER — TELEPHONE (OUTPATIENT)
Dept: PEDIATRICS | Facility: OTHER | Age: 81
End: 2021-08-17

## 2021-08-17 VITALS
RESPIRATION RATE: 16 BRPM | HEIGHT: 64 IN | HEART RATE: 55 BPM | TEMPERATURE: 98.2 F | SYSTOLIC BLOOD PRESSURE: 120 MMHG | WEIGHT: 182 LBS | OXYGEN SATURATION: 93 % | BODY MASS INDEX: 31.07 KG/M2 | DIASTOLIC BLOOD PRESSURE: 80 MMHG

## 2021-08-17 DIAGNOSIS — M54.12 CERVICAL RADICULOPATHY: Primary | ICD-10-CM

## 2021-08-17 DIAGNOSIS — M54.12 CERVICAL RADICULOPATHY: ICD-10-CM

## 2021-08-17 DIAGNOSIS — M47.812 CERVICAL FACET JOINT SYNDROME: ICD-10-CM

## 2021-08-17 DIAGNOSIS — G56.01 CARPAL TUNNEL SYNDROME OF RIGHT WRIST: Primary | ICD-10-CM

## 2021-08-17 DIAGNOSIS — M48.02 FORAMINAL STENOSIS OF CERVICAL REGION: ICD-10-CM

## 2021-08-17 PROCEDURE — 95913 NRV CNDJ TEST 13/> STUDIES: CPT | Mod: 26 | Performed by: PSYCHIATRY & NEUROLOGY

## 2021-08-17 PROCEDURE — G0463 HOSPITAL OUTPT CLINIC VISIT: HCPCS

## 2021-08-17 PROCEDURE — 95913 NRV CNDJ TEST 13/> STUDIES: CPT | Performed by: PSYCHIATRY & NEUROLOGY

## 2021-08-17 PROCEDURE — 95886 MUSC TEST DONE W/N TEST COMP: CPT | Performed by: PSYCHIATRY & NEUROLOGY

## 2021-08-17 PROCEDURE — 95886 MUSC TEST DONE W/N TEST COMP: CPT | Mod: 26 | Performed by: PSYCHIATRY & NEUROLOGY

## 2021-08-17 PROCEDURE — 99205 OFFICE O/P NEW HI 60 MIN: CPT | Mod: 25 | Performed by: PSYCHIATRY & NEUROLOGY

## 2021-08-17 ASSESSMENT — PAIN SCALES - GENERAL: PAINLEVEL: NO PAIN (0)

## 2021-08-17 ASSESSMENT — MIFFLIN-ST. JEOR: SCORE: 1285.55

## 2021-08-17 NOTE — PROGRESS NOTES
Visit Date: 08/17/2021    REFERRING PHYSICIAN:  Dr. Siddharth Kim.    HISTORY OF PRESENT ILLNESS:  The patient is a pleasant 81-year-old lady who relates a 1-year history of pain radiating from the shoulders down the arms and forearms and into the hands.  Symptoms in the hands are diffuse, and the right and left sides are said to be about equally affected.  She has a vague perception of weakness in the upper extremities.  Neck pain has not been prominent.  She does not have symptoms in the lower extremities.    PAST MEDICAL HISTORY:  Significant for obesity, but negative for diabetes.  There is a history of depression.    REVIEW OF SYSTEMS:  A 10-system review of systems is negative.    SOCIAL HISTORY:  The patient does not use tobacco or excessive alcohol.  She is a retired hairdresser.    FAMILY HISTORY:  Negative for neuropathy.    PHYSICAL EXAMINATION:  The patient is 64 inches tall.  Blood pressure is 136/74.  She is afebrile.  Her gait is normal.  There is mild weakness on the left for the forearm flexors, and there is equivocal weakness bilaterally for the triceps with normal strength for the intrinsic hand muscles and shoulder girdle.  Reflexes are 1/4 on the right for the biceps and brachioradialis tendons, but absent at these locations on the left and absent bilaterally for the triceps.  Pinprick is well preserved in the upper extremities.    NERVE CONDUCTION STUDIES:  Motor nerve conduction testing was performed bilaterally for the median and ulnar nerves.  Distal latencies, amplitudes and conduction velocities were normal.  H reflex waveforms were normal for the ulnar nerves, but absent for the median nerves.  This finding usually equates to C7 radiculopathy.    Antidromic sensory nerve studies were performed for the radial nerves and orthodromic mixed conduction studies were performed for the bilateral median and ulnar nerves.  The right median nerve showed mild latency prolongation with mild slowing at  the wrist.  Study of the other nerves was normal.    MONOPOLAR EMG NEEDLE EXAMINATION:  Monopolar needle exam was performed bilaterally for the first dorsal interosseous, extensor digitorum communis, triceps, biceps and deltoid.  The left brachioradialis was also studied.  There were chronic denervation changes bilaterally in the C7 myotome and milder abnormalities on the left in the C6 myotome.    IMPRESSION:  The patient has abnormalities on H reflex testing, physical exam, and EMG that all point to the presence of cervical radiculopathy.  She appears to have significant bilateral involvement at the C7 level and probably has some compromise of the C6 nerve root or anterior horn cell loss at C6 on the left.  MRI of the cervical spine should be obtained to rule out direct compression of the nerve roots, as well as cervical cord itself.    The only distal focal neuropathy for which there is evidence on nerve conduction testing is right carpal tunnel syndrome and this appears to be fairly mild.    Findings were reviewed with the patient.    Segun Wood MD        D: 2021   T: 2021   MT: [facility]    Name:     JUAN BAUTISTA  MRN:      [id number]        Account:    [id number]   :      1940           Visit Date: 2021     Document: [id number]    cc:  Siddharth Kim MD

## 2021-08-17 NOTE — NURSING NOTE
Patient presenting for bilateral EMG of her upper extremities.  Medication Reconciliation: complete  Advanced Care Directive Reviewed.    Yumiko Lyles LPN  8/17/2021 11:20 AM

## 2021-08-17 NOTE — LETTER
8/17/2021         RE: Madiha Beltran  [address]  [place] [address]        Dear Colleague,    Thank you for referring your patient, Madiha Beltran, to the [facility] AND [facility]. Please see a copy of my visit note below.    Visit Date: 08/17/2021    REFERRING PHYSICIAN:  Dr. Siddharth Kim.    HISTORY OF PRESENT ILLNESS:  The patient is a pleasant 81-year-old lady who relates a 1-year history of pain radiating from the shoulders down the arms and forearms and into the hands.  Symptoms in the hands are diffuse, and the right and left sides are said to be about equally affected.  She has a vague perception of weakness in the upper extremities.  Neck pain has not been prominent.  She does not have symptoms in the lower extremities.    PAST MEDICAL HISTORY:  Significant for obesity, but negative for diabetes.  There is a history of depression.    REVIEW OF SYSTEMS:  A 10-system review of systems is negative.    SOCIAL HISTORY:  The patient does not use tobacco or excessive alcohol.  She is a retired hairdresser.    FAMILY HISTORY:  Negative for neuropathy.    PHYSICAL EXAMINATION:  The patient is 64 inches tall.  Blood pressure is 136/74.  She is afebrile.  Her gait is normal.  There is mild weakness on the left for the forearm flexors, and there is equivocal weakness bilaterally for the triceps with normal strength for the intrinsic hand muscles and shoulder girdle.  Reflexes are 1/4 on the right for the biceps and brachioradialis tendons, but absent at these locations on the left and absent bilaterally for the triceps.  Pinprick is well preserved in the upper extremities.    NERVE CONDUCTION STUDIES:  Motor nerve conduction testing was performed bilaterally for the median and ulnar nerves.  Distal latencies, amplitudes and conduction velocities were normal.  H reflex waveforms were normal for the ulnar nerves, but absent for the median nerves.  This finding usually equates  to C7 radiculopathy.    Antidromic sensory nerve studies were performed for the radial nerves and orthodromic mixed conduction studies were performed for the bilateral median and ulnar nerves.  The right median nerve showed mild latency prolongation with mild slowing at the wrist.  Study of the other nerves was normal.    MONOPOLAR EMG NEEDLE EXAMINATION:  Monopolar needle exam was performed bilaterally for the first dorsal interosseous, extensor digitorum communis, triceps, biceps and deltoid.  The left brachioradialis was also studied.  There were chronic denervation changes bilaterally in the C7 myotome and milder abnormalities on the left in the C6 myotome.    IMPRESSION:  The patient has abnormalities on H reflex testing, physical exam, and EMG that all point to the presence of cervical radiculopathy.  She appears to have significant bilateral involvement at the C7 level and probably has some compromise of the C6 nerve root or anterior horn cell loss at C6 on the left.  MRI of the cervical spine should be obtained to rule out direct compression of the nerve roots, as well as cervical cord itself.    The only distal focal neuropathy for which there is evidence on nerve conduction testing is right carpal tunnel syndrome and this appears to be fairly mild.    Findings were reviewed with the patient.    Segun Wood MD        D: 2021   T: 2021   MT: [facility]    Name:     JUAN BAUTISTA  MRN:      [id number]        Account:    [id number]   :      1940           Visit Date: 2021     Document: [id number]    cc:  Siddharth Kim MD       Again, thank you for allowing me to participate in the care of your patient.        Sincerely,        Segun Wood MD

## 2021-08-17 NOTE — TELEPHONE ENCOUNTER
I called Ms. Ruby.  We discussed EMG.      ICD-10-CM    1. Cervical radiculopathy  M54.12 MR Cervical Spine w/o Contrast      -- Follow-up based on results     Signed, Siddharth Kim MD, FAAP, FACP  Internal Medicine & Pediatrics

## 2021-08-18 ENCOUNTER — HOSPITAL ENCOUNTER (OUTPATIENT)
Dept: MRI IMAGING | Facility: OTHER | Age: 81
Discharge: HOME | End: 2021-08-18
Attending: INTERNAL MEDICINE | Admitting: INTERNAL MEDICINE
Payer: MEDICARE

## 2021-08-18 DIAGNOSIS — M54.12 CERVICAL RADICULOPATHY: ICD-10-CM

## 2021-08-18 PROCEDURE — G1004 CDSM NDSC: HCPCS

## 2021-08-18 PROCEDURE — 72141 MRI NECK SPINE W/O DYE: CPT | Mod: ME

## 2021-08-20 ENCOUNTER — OFFICE VISIT (OUTPATIENT)
Dept: ORTHOPEDICS | Facility: OTHER | Age: 81
End: 2021-08-20
Attending: INTERNAL MEDICINE
Payer: MEDICARE

## 2021-08-20 DIAGNOSIS — R20.2 TINGLING OF UPPER EXTREMITY: ICD-10-CM

## 2021-08-20 PROCEDURE — 99203 OFFICE O/P NEW LOW 30 MIN: CPT | Performed by: SPECIALIST

## 2021-08-20 PROCEDURE — G0463 HOSPITAL OUTPT CLINIC VISIT: HCPCS

## 2021-08-20 NOTE — PROGRESS NOTES
Patient is here for consult on her right hand.   Anita Cottrell LPN .....................8/20/2021 3:05 PM

## 2021-08-21 NOTE — PROGRESS NOTES
Visit Date: 2021    HISTORY OF PRESENT ILLNESS:  Madiha Beltran is a 81-year-old female I am seeing today for evaluation of bilateral hand numbness.  She has noted this for some time.  The numbness is not well localized, and is quite bothersome.  The patient underwent EMG studies and was referred for evaluation.  She also had an MRI study.    PHYSICAL EXAMINATION:  Reveals a 81-year-old female.  She is alert and oriented x 3 and appropriate.  Gait and station are appropriate.  She is well groomed, well kempt.  Examination of both upper extremities reveals full and symmetric range of motion of elbows, wrists and hands.  She has evidence of significant intrinsic atrophy of the left abductor pollicis brevis, particularly on the left.  She reports this was likely related to her previous basal joint.  surgery performed a number of years ago.  In addition to this, she has mild reduction in digital range of motion.    X-RAYS:  None were obtained.      EMG studies performed by Dr. Wood on 2021 are reviewed.  The EMG studies reveal findings that are consistent to cervical radiculopathy, probably at C7 with C6 involvement as well.  In addition to this, she does show evidence of mild right carpal tunnel syndrome.      MRI of the cervical spine was also reviewed, dated 2021.  The MRI study shows moderate to severe multilevel facet arthropathy, worse on the left with narrowing at C4-C5.    IMPRESSION AND PLAN:  Numbness consistent with cervical radiculopathy.  No evidence of significant nerve root compression by EMG.  We discussed this at length.  I have recommended the patient see Dr. Amrik Sanches for evaluation, and this will be performed.  If there are any problems in the interim, we would be happy to see her.    Pantera Kaplan MD        D: 2021   T: 2021   MT: [facility]    Name:     MADIHA BELTRAN  MRN:      [id number]        Account:    [id number]   :      1940           Visit  Date: 08/20/2021     Document: [id number]

## 2021-08-30 DIAGNOSIS — M48.02 CERVICAL STENOSIS OF SPINAL CANAL: Primary | ICD-10-CM

## 2021-09-02 ENCOUNTER — OFFICE VISIT (OUTPATIENT)
Dept: ORTHOPEDICS | Facility: OTHER | Age: 81
End: 2021-09-02
Attending: STUDENT IN AN ORGANIZED HEALTH CARE EDUCATION/TRAINING PROGRAM
Payer: MEDICARE

## 2021-09-02 ENCOUNTER — HOSPITAL ENCOUNTER (OUTPATIENT)
Dept: GENERAL RADIOLOGY | Facility: OTHER | Age: 81
End: 2021-09-02
Attending: STUDENT IN AN ORGANIZED HEALTH CARE EDUCATION/TRAINING PROGRAM
Payer: MEDICARE

## 2021-09-02 VITALS — HEIGHT: 62 IN | WEIGHT: 180 LBS | BODY MASS INDEX: 33.13 KG/M2

## 2021-09-02 DIAGNOSIS — M48.02 CERVICAL STENOSIS OF SPINAL CANAL: ICD-10-CM

## 2021-09-02 DIAGNOSIS — M54.2 CERVICAL PAIN: Primary | ICD-10-CM

## 2021-09-02 PROCEDURE — 72040 X-RAY EXAM NECK SPINE 2-3 VW: CPT

## 2021-09-02 PROCEDURE — G0463 HOSPITAL OUTPT CLINIC VISIT: HCPCS | Mod: 25 | Performed by: STUDENT IN AN ORGANIZED HEALTH CARE EDUCATION/TRAINING PROGRAM

## 2021-09-02 PROCEDURE — 99214 OFFICE O/P EST MOD 30 MIN: CPT | Performed by: STUDENT IN AN ORGANIZED HEALTH CARE EDUCATION/TRAINING PROGRAM

## 2021-09-02 ASSESSMENT — MIFFLIN-ST. JEOR: SCORE: 1244.72

## 2021-09-02 NOTE — NURSING NOTE
"Chief Complaint   Patient presents with     Consult     Neck pain      Consult for neck pain     Initial Ht 1.575 m (5' 2\")   Wt 81.6 kg (180 lb)   LMP  (LMP Unknown)   BMI 32.92 kg/m   Estimated body mass index is 32.92 kg/m  as calculated from the following:    Height as of this encounter: 1.575 m (5' 2\").    Weight as of this encounter: 81.6 kg (180 lb).  Medication Reconciliation: complete    Fernanda Braun LPN  "

## 2021-09-02 NOTE — PROGRESS NOTES
Visit Date: 2021    HISTORY OF PRESENT ILLNESS:  Juan is a 81-year-old female who presents for evaluation of neck and arm pain.  She has been dealing with this for a number of years with pain that radiates up the back of her head and into bilateral upper extremities.  She has tried physical therapy and over-the-counter pain medication management, but has not had any sort of surgical intervention or injections.    PHYSICAL EXAMINATION:    GENERAL:  Well-appearing, well-nourished.  NEUROLOGIC:  As follows:  She has 5/5 strength in bilateral upper extremities including deltoid, biceps, triceps, wrist extension, flexion, hand , hand intrinsics.  She has normal sensation to light touch throughout bilateral upper extremities.    IMAGING:  Available for review today includes an MRI of the cervical spine, which shows only mild degenerative changes, worse at the level of C4-C5 with left greater than right sided neural foraminal narrowing.  She has flexion, extension films of the cervical spine, which shows no gross abnormal motion of flexion or extended position.    ASSESSMENT AND PLAN:  Juan is a 81-year-old female with what appears to be occipital neuralgia and C5 radicular pain.  She is going to be referred to Guzman Ramirez at pain clinic at [facility] for bilateral occipital nerve blocks and bilateral C4-C5 transforaminal epidural steroid injection.  She can return to clinic on an as needed basis.    This clinic visit took approximately 30 minutes.    Nguyễn Ramirez MD        D: 2021   T: 2021   MT: [id number]    Name:     JUAN BAUTISTA  MRN:      [id number]        Account:    [id number]   :      1940           Visit Date: 2021     Document: [id number]

## 2021-10-04 ENCOUNTER — IMMUNIZATION (OUTPATIENT)
Dept: FAMILY MEDICINE | Facility: OTHER | Age: 81
End: 2021-10-04
Attending: FAMILY MEDICINE
Payer: MEDICARE

## 2021-10-04 DIAGNOSIS — Z23 NEED FOR PROPHYLACTIC VACCINATION AND INOCULATION AGAINST INFLUENZA: Primary | ICD-10-CM

## 2021-10-04 PROCEDURE — G0008 ADMIN INFLUENZA VIRUS VAC: HCPCS

## 2021-10-04 PROCEDURE — 91300 PR COVID VAC PFIZER DIL RECON 30 MCG/0.3 ML IM: CPT

## 2021-10-04 PROCEDURE — 90662 IIV NO PRSV INCREASED AG IM: CPT

## 2021-10-04 NOTE — ADDENDUM NOTE
Addended by: SIVAN SKINNER on: 10/4/2021 11:39 AM     Modules accepted: Orders, Level of Service, SmartSet

## 2021-10-27 ENCOUNTER — HOSPITAL ENCOUNTER (OUTPATIENT)
Dept: MAMMOGRAPHY | Facility: OTHER | Age: 81
Discharge: HOME | End: 2021-10-27
Attending: INTERNAL MEDICINE | Admitting: INTERNAL MEDICINE
Payer: MEDICARE

## 2021-10-27 DIAGNOSIS — Z12.31 VISIT FOR SCREENING MAMMOGRAM: ICD-10-CM

## 2021-10-27 PROCEDURE — 77063 BREAST TOMOSYNTHESIS BI: CPT

## 2021-11-18 DIAGNOSIS — R21 RASH AND NONSPECIFIC SKIN ERUPTION: ICD-10-CM

## 2021-11-18 RX ORDER — FLUOCINONIDE 0.5 MG/G
CREAM TOPICAL
Qty: 30 G | Refills: 11 | Status: SHIPPED | OUTPATIENT
Start: 2021-11-18 | End: 2022-03-07

## 2022-02-05 ENCOUNTER — ALLIED HEALTH/NURSE VISIT (OUTPATIENT)
Dept: FAMILY MEDICINE | Facility: OTHER | Age: 82
End: 2022-02-05
Attending: FAMILY MEDICINE
Payer: MEDICARE

## 2022-02-05 DIAGNOSIS — R05.9 COUGH: Primary | ICD-10-CM

## 2022-02-05 DIAGNOSIS — J02.9 SORE THROAT: ICD-10-CM

## 2022-02-05 PROCEDURE — C9803 HOPD COVID-19 SPEC COLLECT: HCPCS

## 2022-02-05 PROCEDURE — U0005 INFEC AGEN DETEC AMPLI PROBE: HCPCS | Mod: ZL

## 2022-02-05 NOTE — PROGRESS NOTES
Patient here for Covid Testing. Scratchy/ sore throat and cough. No known exposure.    Colleen Calderon MA on 2/5/2022 at 2:09 PM    
no diabetes and no thyroid trouble.

## 2022-02-07 LAB — SARS-COV-2 RNA RESP QL NAA+PROBE: NEGATIVE

## 2022-02-17 PROBLEM — K21.9 GASTROESOPHAGEAL REFLUX DISEASE: Chronic | Status: ACTIVE | Noted: 2018-06-27

## 2022-02-24 ENCOUNTER — MYC MEDICAL ADVICE (OUTPATIENT)
Dept: PEDIATRICS | Facility: OTHER | Age: 82
End: 2022-02-24
Payer: COMMERCIAL

## 2022-02-24 DIAGNOSIS — R21 RASH AND NONSPECIFIC SKIN ERUPTION: ICD-10-CM

## 2022-02-24 DIAGNOSIS — K21.9 GASTROESOPHAGEAL REFLUX DISEASE, UNSPECIFIED WHETHER ESOPHAGITIS PRESENT: ICD-10-CM

## 2022-02-24 DIAGNOSIS — F39 MOOD DISORDER (H): ICD-10-CM

## 2022-02-24 DIAGNOSIS — E78.2 MIXED HYPERLIPIDEMIA: Chronic | ICD-10-CM

## 2022-02-24 DIAGNOSIS — N39.41 URGE INCONTINENCE: ICD-10-CM

## 2022-02-24 DIAGNOSIS — K22.4 ESOPHAGEAL DYSMOTILITY: ICD-10-CM

## 2022-02-24 DIAGNOSIS — I10 ESSENTIAL HYPERTENSION: Chronic | ICD-10-CM

## 2022-02-24 DIAGNOSIS — M15.0 PRIMARY OSTEOARTHRITIS INVOLVING MULTIPLE JOINTS: ICD-10-CM

## 2022-02-24 DIAGNOSIS — K44.9 HIATAL HERNIA: ICD-10-CM

## 2022-02-24 DIAGNOSIS — T50.905A DRUG-INDUCED HYPOKALEMIA: ICD-10-CM

## 2022-02-24 DIAGNOSIS — E87.6 DRUG-INDUCED HYPOKALEMIA: ICD-10-CM

## 2022-02-24 DIAGNOSIS — E03.9 ACQUIRED HYPOTHYROIDISM: ICD-10-CM

## 2022-03-07 RX ORDER — LEVOTHYROXINE SODIUM 125 UG/1
125 TABLET ORAL DAILY
Qty: 90 TABLET | Refills: 3 | Status: SHIPPED | OUTPATIENT
Start: 2022-03-07 | End: 2022-07-07

## 2022-03-07 RX ORDER — SUCRALFATE 1 G/1
1 TABLET ORAL
Qty: 270 TABLET | Refills: 3 | Status: SHIPPED | OUTPATIENT
Start: 2022-03-07 | End: 2022-07-07

## 2022-03-07 RX ORDER — ATORVASTATIN CALCIUM 40 MG/1
40 TABLET, FILM COATED ORAL DAILY
Qty: 90 TABLET | Refills: 3 | Status: SHIPPED | OUTPATIENT
Start: 2022-03-07 | End: 2022-07-07

## 2022-03-07 RX ORDER — POTASSIUM CHLORIDE 750 MG/1
20 TABLET, EXTENDED RELEASE ORAL DAILY
Qty: 180 TABLET | Refills: 3 | Status: SHIPPED | OUTPATIENT
Start: 2022-03-07 | End: 2022-07-07

## 2022-03-07 RX ORDER — OXYBUTYNIN CHLORIDE 5 MG/1
5 TABLET ORAL DAILY
Qty: 90 TABLET | Refills: 3 | Status: SHIPPED | OUTPATIENT
Start: 2022-03-07 | End: 2022-07-07

## 2022-03-07 RX ORDER — FLUOCINONIDE 0.5 MG/G
CREAM TOPICAL
Qty: 30 G | Refills: 11 | Status: SHIPPED | OUTPATIENT
Start: 2022-03-07 | End: 2023-12-27

## 2022-03-07 RX ORDER — CELECOXIB 100 MG/1
100 CAPSULE ORAL 2 TIMES DAILY
Qty: 180 CAPSULE | Refills: 3 | Status: SHIPPED | OUTPATIENT
Start: 2022-03-07 | End: 2022-07-07

## 2022-03-07 RX ORDER — HYDROCHLOROTHIAZIDE 25 MG/1
25 TABLET ORAL DAILY
Qty: 90 TABLET | Refills: 3 | Status: SHIPPED | OUTPATIENT
Start: 2022-03-07 | End: 2022-07-07

## 2022-03-07 RX ORDER — METOPROLOL TARTRATE 100 MG
100 TABLET ORAL 2 TIMES DAILY
Qty: 180 TABLET | Refills: 3 | Status: SHIPPED | OUTPATIENT
Start: 2022-03-07 | End: 2022-07-07

## 2022-04-05 ENCOUNTER — TELEPHONE (OUTPATIENT)
Dept: PEDIATRICS | Facility: OTHER | Age: 82
End: 2022-04-05
Payer: COMMERCIAL

## 2022-04-05 NOTE — TELEPHONE ENCOUNTER
Patient recently changed over the [employer] mail order.  Form completed with prescription information for on it, wait for MD signature at this time.      Damaris Burdick LPN 4/5/2022 5:17 PM

## 2022-04-21 ENCOUNTER — APPOINTMENT (OUTPATIENT)
Dept: ULTRASOUND IMAGING | Facility: OTHER | Age: 82
End: 2022-04-21
Attending: PHYSICIAN ASSISTANT
Payer: MEDICARE

## 2022-04-21 ENCOUNTER — HOSPITAL ENCOUNTER (EMERGENCY)
Facility: OTHER | Age: 82
Discharge: HOME | End: 2022-04-21
Attending: PHYSICIAN ASSISTANT | Admitting: PHYSICIAN ASSISTANT
Payer: MEDICARE

## 2022-04-21 ENCOUNTER — TELEPHONE (OUTPATIENT)
Dept: PEDIATRICS | Facility: OTHER | Age: 82
End: 2022-04-21

## 2022-04-21 ENCOUNTER — APPOINTMENT (OUTPATIENT)
Dept: CT IMAGING | Facility: OTHER | Age: 82
End: 2022-04-21
Attending: PHYSICIAN ASSISTANT
Payer: MEDICARE

## 2022-04-21 VITALS
SYSTOLIC BLOOD PRESSURE: 95 MMHG | WEIGHT: 180 LBS | HEART RATE: 73 BPM | BODY MASS INDEX: 33.13 KG/M2 | HEIGHT: 62 IN | OXYGEN SATURATION: 94 % | TEMPERATURE: 98 F | DIASTOLIC BLOOD PRESSURE: 62 MMHG | RESPIRATION RATE: 16 BRPM

## 2022-04-21 DIAGNOSIS — R79.89 ELEVATED LFTS: ICD-10-CM

## 2022-04-21 DIAGNOSIS — R10.13 ABDOMINAL PAIN, EPIGASTRIC: ICD-10-CM

## 2022-04-21 LAB
ALBUMIN SERPL-MCNC: 3.8 G/DL (ref 3.5–5.7)
ALBUMIN UR-MCNC: NEGATIVE MG/DL
ALP SERPL-CCNC: 204 U/L (ref 34–104)
ALT SERPL W P-5'-P-CCNC: 241 U/L (ref 7–52)
ANION GAP SERPL CALCULATED.3IONS-SCNC: 8 MMOL/L (ref 3–14)
APPEARANCE UR: CLEAR
AST SERPL W P-5'-P-CCNC: 357 U/L (ref 13–39)
BACTERIA #/AREA URNS HPF: ABNORMAL /HPF
BASOPHILS # BLD AUTO: 0 10E3/UL (ref 0–0.2)
BASOPHILS NFR BLD AUTO: 0 %
BILIRUB DIRECT SERPL-MCNC: 2 MG/DL (ref 0–0.2)
BILIRUB SERPL-MCNC: 2.8 MG/DL (ref 0.3–1)
BILIRUB SERPL-MCNC: 2.9 MG/DL (ref 0.3–1)
BILIRUB UR QL STRIP: NEGATIVE
BUN SERPL-MCNC: 19 MG/DL (ref 7–25)
CALCIUM SERPL-MCNC: 9 MG/DL (ref 8.6–10.3)
CHLORIDE BLD-SCNC: 100 MMOL/L (ref 98–107)
CO2 SERPL-SCNC: 29 MMOL/L (ref 21–31)
COLOR UR AUTO: YELLOW
CREAT SERPL-MCNC: 0.98 MG/DL (ref 0.6–1.2)
EOSINOPHIL # BLD AUTO: 0.1 10E3/UL (ref 0–0.7)
EOSINOPHIL NFR BLD AUTO: 0 %
ERYTHROCYTE [DISTWIDTH] IN BLOOD BY AUTOMATED COUNT: 11.9 % (ref 10–15)
GFR SERPL CREATININE-BSD FRML MDRD: 58 ML/MIN/1.73M2
GLUCOSE BLD-MCNC: 154 MG/DL (ref 70–105)
GLUCOSE UR STRIP-MCNC: NEGATIVE MG/DL
HCT VFR BLD AUTO: 44.5 % (ref 35–47)
HGB BLD-MCNC: 14.9 G/DL (ref 11.7–15.7)
HGB UR QL STRIP: NEGATIVE
IMM GRANULOCYTES # BLD: 0.1 10E3/UL
IMM GRANULOCYTES NFR BLD: 1 %
KETONES UR STRIP-MCNC: NEGATIVE MG/DL
LACTATE SERPL-SCNC: 1.3 MMOL/L (ref 0.7–2)
LACTATE SERPL-SCNC: 2.2 MMOL/L (ref 0.7–2)
LEUKOCYTE ESTERASE UR QL STRIP: ABNORMAL
LIPASE SERPL-CCNC: 29 U/L (ref 11–82)
LYMPHOCYTES # BLD AUTO: 0.5 10E3/UL (ref 0.8–5.3)
LYMPHOCYTES NFR BLD AUTO: 4 %
MCH RBC QN AUTO: 31.8 PG (ref 26.5–33)
MCHC RBC AUTO-ENTMCNC: 33.5 G/DL (ref 31.5–36.5)
MCV RBC AUTO: 95 FL (ref 78–100)
MONOCYTES # BLD AUTO: 0.6 10E3/UL (ref 0–1.3)
MONOCYTES NFR BLD AUTO: 5 %
MUCOUS THREADS #/AREA URNS LPF: PRESENT /LPF
NEUTROPHILS # BLD AUTO: 10.6 10E3/UL (ref 1.6–8.3)
NEUTROPHILS NFR BLD AUTO: 90 %
NITRATE UR QL: NEGATIVE
NRBC # BLD AUTO: 0 10E3/UL
NRBC BLD AUTO-RTO: 0 /100
PH UR STRIP: 5.5 [PH] (ref 5–9)
PLATELET # BLD AUTO: 224 10E3/UL (ref 150–450)
POTASSIUM BLD-SCNC: 3.2 MMOL/L (ref 3.5–5.1)
PROT SERPL-MCNC: 6.4 G/DL (ref 6.4–8.9)
RBC # BLD AUTO: 4.68 10E6/UL (ref 3.8–5.2)
RBC URINE: 1 /HPF
SODIUM SERPL-SCNC: 137 MMOL/L (ref 134–144)
SP GR UR STRIP: 1.01 (ref 1–1.03)
TROPONIN I SERPL-MCNC: 5.5 PG/ML (ref 0–34)
UROBILINOGEN UR STRIP-MCNC: 2 MG/DL
WBC # BLD AUTO: 11.8 10E3/UL (ref 4–11)
WBC URINE: 9 /HPF

## 2022-04-21 PROCEDURE — 96361 HYDRATE IV INFUSION ADD-ON: CPT | Performed by: PHYSICIAN ASSISTANT

## 2022-04-21 PROCEDURE — 82248 BILIRUBIN DIRECT: CPT | Performed by: PHYSICIAN ASSISTANT

## 2022-04-21 PROCEDURE — 96374 THER/PROPH/DIAG INJ IV PUSH: CPT | Performed by: PHYSICIAN ASSISTANT

## 2022-04-21 PROCEDURE — 74176 CT ABD & PELVIS W/O CONTRAST: CPT

## 2022-04-21 PROCEDURE — 93010 ELECTROCARDIOGRAM REPORT: CPT | Performed by: INTERNAL MEDICINE

## 2022-04-21 PROCEDURE — 99284 EMERGENCY DEPT VISIT MOD MDM: CPT | Performed by: PHYSICIAN ASSISTANT

## 2022-04-21 PROCEDURE — 76705 ECHO EXAM OF ABDOMEN: CPT

## 2022-04-21 PROCEDURE — 258N000003 HC RX IP 258 OP 636: Performed by: PHYSICIAN ASSISTANT

## 2022-04-21 PROCEDURE — 83690 ASSAY OF LIPASE: CPT | Performed by: PHYSICIAN ASSISTANT

## 2022-04-21 PROCEDURE — 99285 EMERGENCY DEPT VISIT HI MDM: CPT | Mod: 25 | Performed by: PHYSICIAN ASSISTANT

## 2022-04-21 PROCEDURE — 250N000011 HC RX IP 250 OP 636: Performed by: PHYSICIAN ASSISTANT

## 2022-04-21 PROCEDURE — 83605 ASSAY OF LACTIC ACID: CPT | Performed by: PHYSICIAN ASSISTANT

## 2022-04-21 PROCEDURE — 81003 URINALYSIS AUTO W/O SCOPE: CPT | Performed by: PHYSICIAN ASSISTANT

## 2022-04-21 PROCEDURE — 36415 COLL VENOUS BLD VENIPUNCTURE: CPT | Performed by: PHYSICIAN ASSISTANT

## 2022-04-21 PROCEDURE — 93005 ELECTROCARDIOGRAM TRACING: CPT | Performed by: PHYSICIAN ASSISTANT

## 2022-04-21 PROCEDURE — 87088 URINE BACTERIA CULTURE: CPT | Performed by: PHYSICIAN ASSISTANT

## 2022-04-21 PROCEDURE — 85025 COMPLETE CBC W/AUTO DIFF WBC: CPT | Performed by: PHYSICIAN ASSISTANT

## 2022-04-21 PROCEDURE — 80053 COMPREHEN METABOLIC PANEL: CPT | Performed by: PHYSICIAN ASSISTANT

## 2022-04-21 PROCEDURE — 84484 ASSAY OF TROPONIN QUANT: CPT | Performed by: PHYSICIAN ASSISTANT

## 2022-04-21 RX ORDER — ONDANSETRON 2 MG/ML
4 INJECTION INTRAMUSCULAR; INTRAVENOUS ONCE
Status: COMPLETED | OUTPATIENT
Start: 2022-04-21 | End: 2022-04-21

## 2022-04-21 RX ADMIN — ONDANSETRON 4 MG: 2 INJECTION INTRAMUSCULAR; INTRAVENOUS at 17:53

## 2022-04-21 RX ADMIN — SODIUM CHLORIDE 1000 ML: 9 INJECTION, SOLUTION INTRAVENOUS at 17:53

## 2022-04-21 ASSESSMENT — ENCOUNTER SYMPTOMS
CONFUSION: 0
DIARRHEA: 1
DYSURIA: 0
NAUSEA: 1
FEVER: 0
VOMITING: 1
SHORTNESS OF BREATH: 0
ABDOMINAL PAIN: 1

## 2022-04-21 NOTE — ED TRIAGE NOTES
"Pt here with , pt reports upper abdominal pain, nausea and vomiting and bloating, VSS, pt out into waiting room to wait for ED room  ED Nursing Triage Note (General)   ________________________________    Madiha LANE Beltran is a 81 year old Female that presents to triage private car  With history of  Abdominal pain reported by patient   Significant symptoms had onset noon  BP (!) 142/61   Pulse 64   Temp (!) 96.4  F (35.8  C) (Temporal)   Resp 16   Ht 1.575 m (5' 2\")   Wt 81.6 kg (180 lb)   LMP  (LMP Unknown)   SpO2 94%   BMI 32.92 kg/m  t  Patient appears alert  and oriented, in no acute distress, but was mildly anxious., and cooperative and pleasant behavior.    GCS Total = 15  Airway: intact  Breathing noted as Normal  Circulation Normal  Skin:  Normal  Action taken:  Triage order initiated      PRE HOSPITAL PRIOR LIVING SITUATION Spouse    "

## 2022-04-21 NOTE — ED PROVIDER NOTES
History     Chief Complaint   Patient presents with     Abdominal Pain     HPI  Madiha Beltran is a 81 year old female who presents to the ED for evaluation of abdominal pain. She reports around noon today, approximately 5 hours ago she began to have some epigastric abdominal discomfort along with some nausea and vomiting.  She reports that the pain is in her upper abdomen but does seem to radiate to her shoulder blades.  She has had similar episodes over the last few weeks that seem to resolve on their own.  She denies any fevers, chest pain, shortness of breath, dysuria.  She reports some chronic diarrhea.  She denies any recent antibiotic use or travels or close sick contacts.    Allergies:  Allergies   Allergen Reactions     Bicillin C-R, Nausea     Became faint     Diatrizoate Hives     Sulfa Drugs Rash       Problem List:    Patient Active Problem List    Diagnosis Date Noted     Tingling of upper extremity 07/05/2021     Priority: Medium     Esophageal dysmotility 06/27/2019     Priority: Medium     Inflamed seborrheic keratosis 06/27/2019     Priority: Medium     Gastroesophageal reflux disease, esophagitis presence not specified 06/27/2018     Priority: Medium     IMO Regulatory Load OCT 2020       Essential hypertension 06/27/2018     Priority: Medium     Non morbid obesity due to excess calories 06/27/2018     Priority: Medium     Gastritis, bile acid reflux 06/27/2018     Priority: Medium     Enthesopathy of hip region 01/29/2018     Priority: Medium     Overview:   right    Formatting of this note might be different from the original.  right       Malignant neoplasm of corpus uteri, except isthmus (H) 01/29/2018     Priority: Medium     Cataract 01/29/2018     Priority: Medium     Overview:   bilateral    Formatting of this note might be different from the original.  bilateral       Inflammatory liver disease 01/29/2018     Priority: Medium     Overview:   chronic    Formatting of this note might be  different from the original.  chronic       Tear of lateral cartilage or meniscus of knee, current 01/29/2018     Priority: Medium     Popliteal cyst, right 01/29/2018     Priority: Medium     Mixed hyperlipidemia 06/20/2016     Priority: Medium     Primary osteoarthritis involving multiple joints 06/20/2016     Priority: Medium     Recurrent major depressive disorder, in full remission (H) 06/20/2016     Priority: Medium     Venous stasis of both lower extremities 06/20/2016     Priority: Medium     Prediabetes 06/19/2015     Priority: Medium     Seborrheic keratoses 06/19/2015     Priority: Medium     Urge incontinence 06/19/2015     Priority: Medium     Osteopenia 08/10/2014     Priority: Medium     Overview:   Last DEXA 8/2014    Formatting of this note might be different from the original.  Last DEXA 8/2014       Dysphagia 06/18/2014     Priority: Medium     Hiatal hernia 06/18/2014     Priority: Medium     Scoliosis 06/18/2014     Priority: Medium     Venous stasis of lower extremity 06/18/2014     Priority: Medium     ACP (advance care planning) 12/09/2013     Priority: Medium        Past Medical History:    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, unspecified      Chronic hepatitis (H)      Diaphragmatic hernia without obstruction or gangrene      Disease of stomach and duodenum      Frequency of micturition      Gastro-esophageal reflux disease without esophagitis      Hypothyroidism      Major depressive disorder, recurrent, in full remission (H)      Malignant neoplasm of endometrium (H)      Nontoxic multinodular goiter      Other bursitis of hip, right hip      Other fecal abnormalities      Other seborrheic keratosis      Other specified disorders of bone density and structure, unspecified site (CODE)      Other specified disorders of veins      Prediabetes      Primary generalized (osteo)arthritis      Pure hypercholesterolemia      Pure  hyperglyceridemia      Tear of right meniscus as current injury      Wheezing        Past Surgical History:    Past Surgical History:   Procedure Laterality Date     ARTHROPLASTY KNEE      6/2011,right knee arthroplasty     ARTHROPLASTY KNEE      07/2011, left knee arthroplasty     ARTHROSCOPY KNEE      Right knee surgery removal of myxoma     ARTHROSCOPY KNEE      1/16/01,Arthroscopy left knee and right knee.     CHOLECYSTECTOMY      No Comments Provided     COLONOSCOPY      1997,Colonoscopy - Next one due 01/07     COLONOSCOPY      7/31/06,Colonoscopy normal, colonoscopy due in 2016     COLONOSCOPY N/A     8/4/2016,wnl no need for fu     ESOPHAGOSCOPY, GASTROSCOPY, DUODENOSCOPY (EGD), COMBINED      No Comments Provided     ESOPHAGOSCOPY, GASTROSCOPY, DUODENOSCOPY (EGD), COMBINED      7/27/2017     FINGER SURGERY      05/07,Reconstruct left first metacarpocarpal joint     HYSTERECTOMY TOTAL ABDOMINAL, BILATERAL SALPINGO-OOPHORECTOMY, COMBINED      1/21/97,ILIANA/BSO for endometrial carcinoma     HYSTERECTOMY TOTAL ABDOMINAL, BILATERAL SALPINGO-OOPHORECTOMY, COMBINED      1999     LAPAROSCOPIC TUBAL LIGATION      No Comments Provided     LAPAROSCOPY DIAGNOSTIC (GENERAL)      No Comments Provided     OTHER SURGICAL HISTORY      06/08,,HERNIA REPAIR,Periumbilical incisional hernia, repaired     OTHER SURGICAL HISTORY      06/03,890503,HM DEXA SCAN,DXA normal     OTHER SURGICAL HISTORY      7/31/2014,26698.0,PA COLONOSCOPY BIOPSY SINGLE OR MULTIPLE     THYROIDECTOMY      09/2004,Bilateral thyroidectomy for Hashimoto's thyroiditis       Family History:    Family History   Problem Relation Age of Onset     Hypertension Mother         Hypertension     Heart Disease Mother         Heart Disease,CHF     Hypertension Father         Hypertension     Heart Disease Father         Heart Disease,CHF     Other - See Comments Maternal Grandmother         Stroke     Hypertension Maternal Grandmother         Hypertension     Other  "- See Comments Maternal Grandfather         Stroke     Family History Negative Maternal Grandfather         Good Health,Emphysema     Breast Cancer Other 37        Cancer-breast,fast growing     Diabetes Brother         Diabetes     Family History Negative Sister         Good Health     Arthritis Brother         Arthritis,psoriatic arthritis     Other - See Comments Brother          from burst blood vessel, pneumonia on Enbrel     Breast Cancer Other         Cancer-breast,couple of aunts, youngest at mid 40s     Asthma No family hx of         Asthma     Rhinitis No family hx of         Allergic rhinitis       Social History:  Marital Status:   [2]  Social History     Tobacco Use     Smoking status: Never Smoker     Smokeless tobacco: Never Used   Substance Use Topics     Alcohol use: No     Drug use: Never        Medications:    atorvastatin (LIPITOR) 40 MG tablet  celecoxib (CELEBREX) 100 MG capsule  clindamycin (CLEOCIN) 300 MG capsule  fluocinonide (LIDEX) 0.05 % external cream  FLUoxetine (PROZAC) 20 MG capsule  hydrochlorothiazide (HYDRODIURIL) 25 MG tablet  levothyroxine (SYNTHROID/LEVOTHROID) 125 MCG tablet  metoprolol tartrate (LOPRESSOR) 100 MG tablet  Multiple Vitamin (MULTI-VITAMINS) TABS  omeprazole (PRILOSEC) 20 MG DR capsule  oxybutynin (DITROPAN) 5 MG tablet  potassium chloride ER (KLOR-CON) 10 MEQ CR tablet  sucralfate (CARAFATE) 1 GM tablet  Ubiquinol 100 MG CAPS          Review of Systems   Constitutional: Negative for fever.   HENT: Negative for congestion.    Eyes: Negative for visual disturbance.   Respiratory: Negative for shortness of breath.    Cardiovascular: Negative for chest pain.   Gastrointestinal: Positive for abdominal pain, diarrhea, nausea and vomiting.   Genitourinary: Negative for dysuria.   Psychiatric/Behavioral: Negative for confusion.       Physical Exam   BP: (!) 142/61  Pulse: 64  Temp: (!) 96.4  F (35.8  C)  Resp: 16  Height: 157.5 cm (5' 2\")  Weight: 81.6 kg " (180 lb)  SpO2: 94 %      Physical Exam  Constitutional:       General: She is not in acute distress.     Appearance: She is well-developed. She is not diaphoretic.   HENT:      Head: Normocephalic and atraumatic.   Eyes:      General: No scleral icterus.     Conjunctiva/sclera: Conjunctivae normal.   Cardiovascular:      Rate and Rhythm: Normal rate and regular rhythm.   Pulmonary:      Effort: Pulmonary effort is normal.      Breath sounds: Normal breath sounds.   Abdominal:      Palpations: Abdomen is soft.      Tenderness: There is abdominal tenderness in the epigastric area. There is no right CVA tenderness, left CVA tenderness, guarding or rebound. Negative signs include Kim's sign, Rovsing's sign and McBurney's sign.   Musculoskeletal:         General: No deformity.      Cervical back: Neck supple.   Lymphadenopathy:      Cervical: No cervical adenopathy.   Skin:     General: Skin is warm and dry.      Coloration: Skin is not jaundiced.      Findings: No rash.   Neurological:      Mental Status: She is alert and oriented to person, place, and time. Mental status is at baseline.   Psychiatric:         Mood and Affect: Mood normal.         Behavior: Behavior normal.         ED Course                 Procedures         EKG read at 1754, HR 67, NSR, no ST changes.     Critical Care time:  none               Results for orders placed or performed during the hospital encounter of 04/21/22 (from the past 24 hour(s))   CBC with platelets differential    Narrative    The following orders were created for panel order CBC with platelets differential.  Procedure                               Abnormality         Status                     ---------                               -----------         ------                     CBC with platelets and d...[[id number]]  Abnormal            Final result                 Please view results for these tests on the individual orders.   Comprehensive metabolic panel   Result Value  Ref Range    Sodium 137 134 - 144 mmol/L    Potassium 3.2 (L) 3.5 - 5.1 mmol/L    Chloride 100 98 - 107 mmol/L    Carbon Dioxide (CO2) 29 21 - 31 mmol/L    Anion Gap 8 3 - 14 mmol/L    Urea Nitrogen 19 7 - 25 mg/dL    Creatinine 0.98 0.60 - 1.20 mg/dL    Calcium 9.0 8.6 - 10.3 mg/dL    Glucose 154 (H) 70 - 105 mg/dL    Alkaline Phosphatase 204 (H) 34 - 104 U/L     (H) 13 - 39 U/L     (H) 7 - 52 U/L    Protein Total 6.4 6.4 - 8.9 g/dL    Albumin 3.8 3.5 - 5.7 g/dL    Bilirubin Total 2.8 (H) 0.3 - 1.0 mg/dL    GFR Estimate 58 (L) >60 mL/min/1.73m2   Lipase   Result Value Ref Range    Lipase 29 11 - 82 U/L   Lactic acid whole blood   Result Value Ref Range    Lactic Acid 2.2 (H) 0.7 - 2.0 mmol/L   Troponin I   Result Value Ref Range    Troponin I 5.5 0.0 - 34.0 pg/mL   CBC with platelets and differential   Result Value Ref Range    WBC Count 11.8 (H) 4.0 - 11.0 10e3/uL    RBC Count 4.68 3.80 - 5.20 10e6/uL    Hemoglobin 14.9 11.7 - 15.7 g/dL    Hematocrit 44.5 35.0 - 47.0 %    MCV 95 78 - 100 fL    MCH 31.8 26.5 - 33.0 pg    MCHC 33.5 31.5 - 36.5 g/dL    RDW 11.9 10.0 - 15.0 %    Platelet Count 224 150 - 450 10e3/uL    % Neutrophils 90 %    % Lymphocytes 4 %    % Monocytes 5 %    % Eosinophils 0 %    % Basophils 0 %    % Immature Granulocytes 1 %    NRBCs per 100 WBC 0 <1 /100    Absolute Neutrophils 10.6 (H) 1.6 - 8.3 10e3/uL    Absolute Lymphocytes 0.5 (L) 0.8 - 5.3 10e3/uL    Absolute Monocytes 0.6 0.0 - 1.3 10e3/uL    Absolute Eosinophils 0.1 0.0 - 0.7 10e3/uL    Absolute Basophils 0.0 0.0 - 0.2 10e3/uL    Absolute Immature Granulocytes 0.1 <=0.4 10e3/uL    Absolute NRBCs 0.0 10e3/uL   Bilirubin Direct and Total   Result Value Ref Range    Bilirubin Direct 2.0 (H) 0.0 - 0.2 mg/dL    Bilirubin Total 2.9 (H) 0.3 - 1.0 mg/dL   CT Abdomen Pelvis w/o Contrast    Narrative    EXAMINATION: CT ABDOMEN PELVIS W/O CONTRAST, 4/21/2022 6:10 PM    TECHNIQUE:  Helical CT images from the lung bases through  the  symphysis pubis were obtained  with IV contrast. Contrast dose:    COMPARISON: none    HISTORY: Bilateral upper abdominal pain    FINDINGS:    There is dependent atelectasis at the lung bases. There is a hiatal  hernia.    The liver is free of masses or biliary ductal enlargement. The  gallbladder has been removed    The the spleen and pancreas appear normal.    The adrenal glands are normal.    The right and left kidneys are free of masses or hydronephrosis.    The periaortic lymph nodes are normal in caliber. There is a  retroaortic left renal vein which is of normal variant    No intraperitoneal masses or inflammatory changes are noted.    In the pelvis the bladder and rectum appear normal.    Degenerative changes are present in the thoracic and lumbar spine      Impression    IMPRESSION: No intraperitoneal masses or inflammatory changes    Hiatal hernia.     JW SLATER MD         SYSTEM ID:  [id number]   US Abdomen Limited    Narrative    PROCEDURES: US ABDOMEN LIMITED    HISTORY: n/v, upper abdominal pain. Increased bilirubin and LFTs.  previous cholecystectomy    TECHNIQUE: Grayscale ultrasound of the upper abdomen was performed.    COMPARISON: none     FINDINGS:    MEASUREMENTS:   Liver length:  12 cm.   Common duct diameter:  1.4 cm.    LIVER: Liver is free of masses or biliary ductal enlargement    GALLBLADDER: The gallbladder has been removed    ABDOMINAL AORTA AND IVC: The abdominal aorta is normally tapering. The  inferior vena cava is patent.    PANCREAS: Portions of the head and body of the pancreas are imaged and  appeared normal.    Right KIDNEY: Right kidney is free of masses or hydronephrosis.        Impression    IMPRESSION:     Status post cholecystectomy. The bile ducts are normal for a  postcholecystectomy patient. No common duct stones are seen    JW SLATER MD         SYSTEM ID:  [id number]   Lactic acid whole blood   Result Value Ref Range    Lactic Acid 1.3 0.7 - 2.0 mmol/L     Narrative    Wanted on new sample   UA with Microscopic reflex to Culture    Specimen: Urine, Midstream   Result Value Ref Range    Color Urine Yellow Colorless, Straw, Light Yellow, Yellow    Appearance Urine Clear Clear    Glucose Urine Negative Negative mg/dL    Bilirubin Urine Negative Negative    Ketones Urine Negative Negative mg/dL    Specific Gravity Urine 1.012 1.000 - 1.030    Blood Urine Negative Negative    pH Urine 5.5 5.0 - 9.0    Protein Albumin Urine Negative Negative mg/dL    Urobilinogen Urine 2.0 Normal, 2.0 mg/dL    Nitrite Urine Negative Negative    Leukocyte Esterase Urine Moderate (A) Negative    Bacteria Urine Many (A) None Seen /HPF    Mucus Urine Present (A) None Seen /LPF    RBC Urine 1 <=2 /HPF    WBC Urine 9 (H) <=5 /HPF    Narrative    Urine Culture ordered based on laboratory criteria       Medications   0.9% sodium chloride BOLUS (0 mLs Intravenous Stopped 4/21/22 2008)   ondansetron (ZOFRAN) injection 4 mg (4 mg Intravenous Given 4/21/22 1753)       Assessments & Plan (with Medical Decision Making)   Nontoxic and in no acute distress.  She does have slight tenderness to palpation in her upper abdominal area.  She did have 2 episodes of vomiting upon arrival to the emergency department.  Vital signs are stable and she is afebrile.    She has WBC 11.8, elevated LFTs and bilirubin.  Urinalysis shows moderate leukocyte esterase with 9 white cells however she currently is having any urinary infectious complaints therefore we will culture before treating.    CT abd read as No intraperitoneal masses or inflammatory changes, Hiatal hernia.     US abd read as Status post cholecystectomy. The bile ducts are normal for a  postcholecystectomy patient. No common duct stones are seen.     She is given fluids and Zofran and her symptoms completely resolved.    I discussed her case with Dr. Ambrosio, GI specialist at [facility].  He recommends that she receive an MRCP as an outpatient  evaluating for retained stone as well as PCP follow-up next week for reassessment of liver functions.    Strict return precautions are given to the pt, they will return if symptoms are worsening or concerning. The pt understands and agrees with the plan and they are discharged.     Rafiq Jalloh PA-C    I have reviewed the nursing notes.    I have reviewed the findings, diagnosis, plan and need for follow up with the patient.       Discharge Medication List as of 4/21/2022  9:49 PM          Final diagnoses:   Abdominal pain, epigastric   Elevated LFTs       4/21/2022   [facility]     aRfiq Jalloh PA  04/21/22 1708

## 2022-04-21 NOTE — TELEPHONE ENCOUNTER
Please call the patient with a work in tomorrow with any Doctor.  She will take a Doctor in Internal Meds  as well.  She does not feel the [facility] can help her.   She has indigestion again for the 3rd time this month and does not feel at all well.      Sarah Beth Vazquez on 4/21/2022 at 3:32 PM

## 2022-04-22 ENCOUNTER — TELEPHONE (OUTPATIENT)
Dept: EMERGENCY MEDICINE | Facility: OTHER | Age: 82
End: 2022-04-22
Payer: COMMERCIAL

## 2022-04-22 NOTE — DISCHARGE INSTRUCTIONS
Get plenty of fluids and rest.  As discussed, you do have some elevated liver functions today.  Remainder of your lab work and images appear well.  I discussed your case with Dr. Ambrosio, GI specialist at [facility].  He recommends that she get an outpatient MRI study looking at the area for a retained stone, referral was placed.  I would also like you to follow-up with your PCP next week and have your liver functions rechecked.  If you do not hear from our  by noon tomorrow you can call the hospital to inquire, return the ED if there is intractable pain or nausea and vomiting.

## 2022-04-22 NOTE — TELEPHONE ENCOUNTER
[facility] Emergency Department Lab result notification [Adult-Female]    [place] ED lab result protocol used  Urine Culture    Reason for call  Notify of lab results, assess symptoms,  review ED providers recommendations/discharge instructions (if necessary) and advise per ED lab result f/u protocol    Lab Result (including Rx patient on, if applicable)  Preliminary urine culture report on 4/22/22 shows the presence of bacteria(s):  50,000-100,000 CFU/mL Gram negative bacilli   Emergency Dept/Urgent Care discharge antibiotic: None  Recommendations per [facility] ED Lab result Urine culture protocol.    Information table from Emergency Dept Provider visit on 4/21/22  Symptoms reported at ED visit (Chief complaint, HPI)  Abdominal Pain      HPI  Madiha Beltran is a 81 year old female who presents to the ED for evaluation of abdominal pain. She reports around noon today, approximately 5 hours ago she began to have some epigastric abdominal discomfort along with some nausea and vomiting.  She reports that the pain is in her upper abdomen but does seem to radiate to her shoulder blades.  She has had similar episodes over the last few weeks that seem to resolve on their own.  She denies any fevers, chest pain, shortness of breath, dysuria.  She reports some chronic diarrhea.  She denies any recent antibiotic use or travels or close sick contacts.     Significant Medical hx, if applicable (i.e. CKD, diabetes) Reviewed   Allergies Allergies   Allergen Reactions     Bicillin C-R, Nausea     Became faint     Diatrizoate Hives     Sulfa Drugs Rash      Weight, if applicable Wt Readings from Last 2 Encounters:   04/21/22 81.6 kg (180 lb)   09/02/21 81.6 kg (180 lb)      Coumadin/Warfarin [Yes /No] No   Creatinine Level (mg/dl) Creatinine   Date Value Ref Range Status   04/21/2022 0.98 0.60 - 1.20 mg/dL Final   07/05/2021 0.89 0.60 - 1.20 mg/dL Final      Creatinine clearance (ml/min), if applicable  Serum creatinine: 0.98 mg/dL 04/21/22 1753  Estimated creatinine clearance: 46.1 mL/min   Pregnant (Yes/No/NA) NA   Breastfeeding (Yes/No/NA) NA   ED providers Impression and Plan (applicable information) Nontoxic and in no acute distress.  She does have slight tenderness to palpation in her upper abdominal area.  She did have 2 episodes of vomiting upon arrival to the emergency department.  Vital signs are stable and she is afebrile.     She has WBC 11.8, elevated LFTs and bilirubin.  Urinalysis shows moderate leukocyte esterase with 9 white cells however she currently is having any urinary infectious complaints therefore we will culture before treating.     CT abd read as No intraperitoneal masses or inflammatory changes, Hiatal hernia.      US abd read as Status post cholecystectomy. The bile ducts are normal for a  postcholecystectomy patient. No common duct stones are seen.      She is given fluids and Zofran and her symptoms completely resolved.     I discussed her case with Dr. Ambrosio, GI specialist at [facility].  He recommends that she receive an MRCP as an outpatient evaluating for retained stone as well as PCP follow-up next week for reassessment of liver functions.     Strict return precautions are given to the pt, they will return if symptoms are worsening or concerning. The pt understands and agrees with the plan and they are discharged.       ED diagnosis Abdominal pain, epigastric   Elevated LFTs        ED provider Rafiq Jalloh PA-C      RN Assessment (Patient s current Symptoms), include time called.  [Insert Left message here if message left]  9:56a Spoke with Madiha and relayed preliminary urine culture  She denies any symptoms at this time and is comfortable waiting for the final report.  She will expect a call when the final is in.      RN Recommendations/Instructions per [facility] ED lab result protocol  Patient notified of lab result and treatment recommendations.[facility]  Emergency   Please Contact your PCP clinic or return to the Emergency department if your:    Symptoms return.    Symptoms do not improve after 3 days on antibiotic.    Symptoms do not resolve after completing antibiotic.    Symptoms worsen or other concerning symptom's.    Mariza Magallanes RN  [facility]  Emergency Dept Lab Result RN  # [phone]     Copy of Lab result   Urine Culture  Order: [id number] - Reflex for Order [id number]   Status: Preliminary result     Visible to patient: No (not released)    Specimen Information: Urine, Midstream         1 Result Note    Culture 50,000-100,000 CFU/mL Gram negative bacilli Abnormal             Resulting Agency: [facility]           Specimen Collected: 04/21/22  8:45 PM Last Resulted: 04/22/22  9:01 AM

## 2022-04-22 NOTE — TELEPHONE ENCOUNTER
Per chart review, Pt was seen in ED yesterday, for evaluation of abdominal pain. Pt was discharged at 4/21/22 at 9:48 PM. She was instructed to follor up with Dr. Kim in 1 week (4/28/22). Joycelyn Macario RN .............. 4/22/2022  4:39 PM

## 2022-04-23 LAB — BACTERIA UR CULT: ABNORMAL

## 2022-04-23 RX ORDER — NITROFURANTOIN 25; 75 MG/1; MG/1
100 CAPSULE ORAL EVERY 12 HOURS
Qty: 10 CAPSULE | Refills: 0 | Status: SHIPPED | OUTPATIENT
Start: 2022-04-23 | End: 2022-04-28

## 2022-04-23 NOTE — TELEPHONE ENCOUNTER
[facility] [place] Emergency Department Lab result notification:    Reason for call  Final Urine Culture    Lab Result  Final urine culture on 4/23/22 shows the presence of bacteria(s): 50,000-100,000 CFU/mL Klebsiella pneumoniae  [facility] Emergency Dept discharge antibiotic: None  Recommendations in treatment per [facility] ED lab result Urine Culture protocol.    ED visit Date: 4/21/22  Symptoms reported at ED visit Nontoxic and in no acute distress.  She does have slight tenderness to palpation in her upper abdominal area.  She did have 2 episodes of vomiting upon arrival to the emergency department.  Vital signs are stable and she is afebrile.     She has WBC 11.8, elevated LFTs and bilirubin.  Urinalysis shows moderate leukocyte esterase with 9 white cells however she currently is having any urinary infectious complaints therefore we will culture before treating.     CT abd read as No intraperitoneal masses or inflammatory changes, Hiatal hernia.      US abd read as Status post cholecystectomy. The bile ducts are normal for a  postcholecystectomy patient. No common duct stones are seen.      She is given fluids and Zofran and her symptoms completely resolved.     I discussed her case with Dr. Ambrosio, GI specialist at [facility].  He recommends that she receive an MRCP as an outpatient evaluating for retained stone as well as PCP follow-up next week for reassessment of liver functions.     Strict return precautions are given to the pt, they will return if symptoms are worsening or concerning. The pt understands and agrees with the plan and they are discharged.    Miscellaneous information See allergies  Creatine clearance 46.1 ml/min     Current symptoms  1:03p  Spoke with patient   Relayed to patient results of urine culture. She denies symptoms such as dysuria, frequency and urgency. Patient would like to start treatment. She confirms allergies and would like to avoid  any related to those classifications.     Recommendations/Instructions  Patient notified of lab result and treatment recommendations.  Rx for Nitrofurantoin Macrocrystal-Monohydrate (Macrobid) 100 mg PO capsule, 1 capsule (100 mg) by mouth 2 times daily for 5 days sent to [Pharmacy - [facility]].  RN reviewed information about Patient Education on preventing future UTI's.  1. Practice good personal hygiene. Wipe yourself from front to back after using the toilet. This helps keep bacteria from getting into the urethra. Keep the genital area clean and dry.  2. Drink plenty of fluids, such as water, juice, or other caffeine-free drinks.  Drink at least 6-8 glasses a day (1 1/2 to 2 1/2 liters), unless you must restrict fluids for other medical reasons. This will force the medicine (antibiotic) into your urinary system and flush the bacteria out of your body. Cranberry juice has been shown to help clear out the bacteria.  3. Empty your bladder. Always empty your bladder when you feel the urge to urinate. And always urinate before going to sleep. Urine that stays in your bladder can lead to infection. Try to urinate before and after sex as well.  4. Wear cotton underwear and cotton-lined panty hose; avoid tight-fitting pants.  5. Follow up with your health care provider as directed. He or she may test to make sure the infection has cleared. If necessary, additional treatment may be started.      Contact your PCP clinic or return to the Emergency department if your:    Symptoms return.    Symptoms do not improved after 3 days on antibiotic.    Symptoms do not resolve after completing antibiotic.    Symptoms worsen or other concerning symptom's.    Mariza Magallanes, GAURI  [facility] [employer] [place]  Emergency Dept Lab Result RN  Ph# [phone]     Copy of Lab result   Urine Culture  Order: [id number] - Reflex for Order [id number]   Status: Final result     Visible to patient: Yes (not seen)     Specimen Information: Urine, Midstream         3 Result Notes    Culture 50,000-100,000 CFU/mL Klebsiella pneumoniae Abnormal             Resulting Agency: [facility] LAB       Susceptibility      Klebsiella pneumoniae (1)    Antibiotic Interpretation Sensitivity Method Status    Amoxicillin/Clavulanate Susceptible <=8 ROCKY Final    Ampicillin Resistant >16 ROCKY Final    Ampicillin/ Sulbactam Susceptible <=8 ROCKY Final    Aztreonam Susceptible <=4 ROCKY Final    Cefazolin Susceptible <=2 ROCKY Final    Cefoxitin Susceptible <=8 ROCKY Final    Ciprofloxacin   ROCKY Final     Ciprofloxacin not resistant.  Due to test limitations, lab cannot provide ROCKY to determine susceptibility.       Ertapenem Susceptible <=0.5 ROCKY Final    Gentamicin Susceptible <=4 ROCKY Final    Imipenem Susceptible <=1 ROCKY Final    Levofloxacin   ROCKY Final     Levofloxacin not resistant.  Due to test limitations, lab cannot provide ROCKY to determine susceptibility.       Nitrofurantoin Susceptible <=32 ROCKY Final    Piperacillin/Tazobactam Susceptible <=16 ROCKY Final    Tetracycline Susceptible <=4 ROCYK Final    Tobramycin Susceptible <=4 ROCKY Final    Trimethoprim/Sulfamethoxazole Susceptible <=2/38 ROCKY Final    Cefpodoxime Susceptible <=2 ROCKY Final    Cefuroxime Susceptible <=4 ROCKY Final    Trimethoprim Susceptible <=8 ROCKY Final          Specimen Collected: 04/21/22  8:45 PM Last Resulted: 04/23/22 12:53 PM

## 2022-04-24 LAB
ATRIAL RATE - MUSE: 67 BPM
DIASTOLIC BLOOD PRESSURE - MUSE: NORMAL MMHG
INTERPRETATION ECG - MUSE: NORMAL
P AXIS - MUSE: 59 DEGREES
PR INTERVAL - MUSE: 178 MS
QRS DURATION - MUSE: 104 MS
QT - MUSE: 420 MS
QTC - MUSE: 443 MS
R AXIS - MUSE: -40 DEGREES
SYSTOLIC BLOOD PRESSURE - MUSE: NORMAL MMHG
T AXIS - MUSE: 35 DEGREES
VENTRICULAR RATE- MUSE: 67 BPM

## 2022-04-25 ENCOUNTER — TELEPHONE (OUTPATIENT)
Dept: PEDIATRICS | Facility: OTHER | Age: 82
End: 2022-04-25
Payer: COMMERCIAL

## 2022-04-25 ENCOUNTER — OFFICE VISIT (OUTPATIENT)
Dept: PEDIATRICS | Facility: OTHER | Age: 82
End: 2022-04-25
Attending: INTERNAL MEDICINE
Payer: COMMERCIAL

## 2022-04-25 VITALS
TEMPERATURE: 97.2 F | HEART RATE: 52 BPM | RESPIRATION RATE: 16 BRPM | OXYGEN SATURATION: 94 % | WEIGHT: 173.1 LBS | BODY MASS INDEX: 31.66 KG/M2 | SYSTOLIC BLOOD PRESSURE: 132 MMHG | DIASTOLIC BLOOD PRESSURE: 96 MMHG

## 2022-04-25 DIAGNOSIS — B17.9 ACUTE HEPATITIS: ICD-10-CM

## 2022-04-25 DIAGNOSIS — Z91.041 CONTRAST MEDIA ALLERGY: Primary | ICD-10-CM

## 2022-04-25 DIAGNOSIS — E80.6 DIRECT HYPERBILIRUBINEMIA: Primary | ICD-10-CM

## 2022-04-25 PROCEDURE — G0463 HOSPITAL OUTPT CLINIC VISIT: HCPCS

## 2022-04-25 PROCEDURE — 99214 OFFICE O/P EST MOD 30 MIN: CPT | Performed by: INTERNAL MEDICINE

## 2022-04-25 RX ORDER — DIPHENHYDRAMINE HCL 50 MG
50 CAPSULE ORAL ONCE
Qty: 1 CAPSULE | Refills: 0 | Status: SHIPPED | OUTPATIENT
Start: 2022-04-25 | End: 2022-04-25

## 2022-04-25 RX ORDER — METHYLPREDNISOLONE 32 MG/1
TABLET ORAL
Qty: 2 TABLET | Refills: 0 | Status: SHIPPED | OUTPATIENT
Start: 2022-04-25 | End: 2022-07-07

## 2022-04-25 ASSESSMENT — PAIN SCALES - GENERAL: PAINLEVEL: NO PAIN (0)

## 2022-04-25 NOTE — NURSING NOTE
"Chief Complaint   Patient presents with     Follow Up     ER- epigastric pain     Patient presents to the clinic for an ER f/u. Patient stated she is nauseated, very tired and vomited up her breakfast this morning.    Initial BP (!) 132/96 (BP Location: Right arm, Patient Position: Sitting, Cuff Size: Adult Regular)   Pulse 52   Temp 97.2  F (36.2  C) (Tympanic)   Resp 16   Wt 78.5 kg (173 lb 1.6 oz)   LMP  (LMP Unknown)   SpO2 94%   Breastfeeding No   BMI 31.66 kg/m   Estimated body mass index is 31.66 kg/m  as calculated from the following:    Height as of 4/21/22: 1.575 m (5' 2\").    Weight as of this encounter: 78.5 kg (173 lb 1.6 oz).  Medication Reconciliation: Completed     Advanced Care Directive Reviewed    Onofre Badillo LPN  "

## 2022-04-25 NOTE — PROGRESS NOTES
Assessment & Plan   1. Direct hyperbilirubinemia  2. Acute hepatitis  Given the clinical syndrome associated with laboratory findings I believe this is consistent with direct hyperbilirubinemia from partial biliary obstruction.  Differential diagnosis includes biliary strictures, liver or pancreatic cancer, primary biliary cirrhosis, intraductal papillary mucinous neoplasm, others.  I see no evidence for adverse medication effect.  I educated her on the possibility of a sending cholangitis and warned to return if symptoms worsen.  Otherwise MRCP as scheduled.  Recommend gastroenterology consultation.  - Adult Gastro Ref - Consult Only; Future    Signed, Siddharth Kim MD, FAAP, FACP  Internal Medicine & Pediatrics    Subjective   Madiha Beltran is a 81 year old female who presents for ER follow-up.  She recently presented to the ER for evaluation of abdominal pain, nausea and vomiting.  Had a thorough evaluation and was found to have elevated transaminases and bilirubin.  He is scheduled for outpatient MRCP.  She had an attack this morning where she felt some abdominal pain and she vomited but then felt better.  She has had her gallbladder removed.  These attacks feel like gallbladder attacks.  No recent travel.    Objective   Vitals: BP (!) 132/96 (BP Location: Right arm, Patient Position: Sitting, Cuff Size: Adult Regular)   Pulse 52   Temp 97.2  F (36.2  C) (Tympanic)   Resp 16   Wt 78.5 kg (173 lb 1.6 oz)   LMP  (LMP Unknown)   SpO2 94%   Breastfeeding No   BMI 31.66 kg/m      Abdomen: Soft, nontender, nondistended  Cardiovascular: Regular, no murmur  Pulmonary: Clear    Review and Analysis of Data   I personally reviewed the following:  External notes: No  Results: Yes Laboratory data, imaging reports from emergency room visit reviewed with patient today  Use of an independent historian: No  Independent review of a test performed by another physician: No  Discussion of management with another  physician: Yes I reviewed CT images with local radiologist today  Moderate risk of morbidity from additional diagnostic testing and/or treatment.

## 2022-04-25 NOTE — TELEPHONE ENCOUNTER
Received call from radiology scheduling.  Please call patient.      ICD-10-CM    1. Contrast media allergy  Z91.041 methylPREDNISolone (MEDROL) 32 MG tablet     diphenhydrAMINE (BENADRYL) 50 MG capsule      Orders Placed This Encounter   Medications     methylPREDNISolone (MEDROL) 32 MG tablet     Si mg PO 12 hr prior to exam, and 32 mg PO 2 hr prior to exam     Dispense:  2 tablet     Refill:  0     diphenhydrAMINE (BENADRYL) 50 MG capsule     Sig: Take 1 capsule (50 mg) by mouth once for 1 dose Administer 30 min - 2 hours pre - IV contrast injection     Dispense:  1 capsule     Refill:  0     One hour prior to exam     Signed, Siddharth Kim MD, FAAP, FACP  Internal Medicine & Pediatrics

## 2022-05-03 ENCOUNTER — HOSPITAL ENCOUNTER (OUTPATIENT)
Dept: MRI IMAGING | Facility: OTHER | Age: 82
Discharge: HOME | End: 2022-05-03
Attending: PHYSICIAN ASSISTANT | Admitting: PHYSICIAN ASSISTANT
Payer: MEDICARE

## 2022-05-03 DIAGNOSIS — R79.89 ELEVATED LFTS: ICD-10-CM

## 2022-05-03 DIAGNOSIS — R10.13 ABDOMINAL PAIN, EPIGASTRIC: ICD-10-CM

## 2022-05-03 PROCEDURE — A9575 INJ GADOTERATE MEGLUMI 0.1ML: HCPCS | Performed by: PHYSICIAN ASSISTANT

## 2022-05-03 PROCEDURE — 255N000002 HC RX 255 OP 636: Performed by: PHYSICIAN ASSISTANT

## 2022-05-03 PROCEDURE — 74183 MRI ABD W/O CNTR FLWD CNTR: CPT

## 2022-05-03 RX ORDER — GADOTERATE MEGLUMINE 376.9 MG/ML
15 INJECTION INTRAVENOUS ONCE
Status: COMPLETED | OUTPATIENT
Start: 2022-05-03 | End: 2022-05-03

## 2022-05-03 RX ADMIN — GADOTERATE MEGLUMINE 15 ML: 376.9 INJECTION INTRAVENOUS at 13:02

## 2022-05-11 ENCOUNTER — OFFICE VISIT (OUTPATIENT)
Dept: INTERNAL MEDICINE | Facility: OTHER | Age: 82
End: 2022-05-11
Attending: PHYSICIAN ASSISTANT
Payer: COMMERCIAL

## 2022-05-11 VITALS
WEIGHT: 174.6 LBS | HEART RATE: 51 BPM | RESPIRATION RATE: 18 BRPM | TEMPERATURE: 97.6 F | BODY MASS INDEX: 31.93 KG/M2 | OXYGEN SATURATION: 96 % | SYSTOLIC BLOOD PRESSURE: 110 MMHG | DIASTOLIC BLOOD PRESSURE: 62 MMHG

## 2022-05-11 DIAGNOSIS — R10.13 ABDOMINAL PAIN, EPIGASTRIC: ICD-10-CM

## 2022-05-11 DIAGNOSIS — R79.89 ELEVATED LFTS: ICD-10-CM

## 2022-05-11 PROCEDURE — 99213 OFFICE O/P EST LOW 20 MIN: CPT | Performed by: INTERNAL MEDICINE

## 2022-05-11 PROCEDURE — G0463 HOSPITAL OUTPT CLINIC VISIT: HCPCS

## 2022-05-11 ASSESSMENT — ENCOUNTER SYMPTOMS
CONSTITUTIONAL NEGATIVE: 1
ENDOCRINE NEGATIVE: 1
EYES NEGATIVE: 1
ALLERGIC/IMMUNOLOGIC NEGATIVE: 1

## 2022-05-11 ASSESSMENT — PAIN SCALES - GENERAL: PAINLEVEL: MILD PAIN (3)

## 2022-05-11 NOTE — NURSING NOTE
"Chief Complaint   Patient presents with     Follow Up     ED       Initial /62   Pulse 51   Temp 97.6  F (36.4  C) (Tympanic)   Resp 18   Wt 79.2 kg (174 lb 9.6 oz)   LMP  (LMP Unknown)   SpO2 96%   Breastfeeding No   BMI 31.93 kg/m   Estimated body mass index is 31.93 kg/m  as calculated from the following:    Height as of 4/21/22: 1.575 m (5' 2\").    Weight as of this encounter: 79.2 kg (174 lb 9.6 oz).  FOOD SECURITY SCREENING QUESTIONS  Hunger Vital Signs:  Within the past 12 months we worried whether our food would run out before we got money to buy more. Never  Within the past 12 months the food we bought just didn't last and we didn't have money to get more. Never        Edvin Joshi, LPN    "

## 2022-05-11 NOTE — PROGRESS NOTES
Chief Complaint: ED follow-up.    HPI: She is here for follow-up from the emergency room.  She has been having right upper quadrant pain.  She has had transaminitis associated with this.  CT and MRI have been unrevealing.  It was thought initially that she may have just passed a stone but she has had 4 episodes of pain now including an episode again last night.  She does have an appointment scheduled with gastroenterology in [place] on Monday.  She is in today to see if she should keep that appointment.    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, unspecified     Benign myxoma, right knee     Chronic hepatitis (H)     Chronic hepatitis, mild, stable     Diaphragmatic hernia without obstruction or gangrene     No Comments Provided     Disease of stomach and duodenum     Peptic acid disease, esophageal reflux     Frequency of micturition     No Comments Provided     Gastro-esophageal reflux disease without esophagitis     No Comments Provided     Hypothyroidism     No Comments Provided     Major depressive disorder, recurrent, in full remission (H)     No Comments Provided     Malignant neoplasm of endometrium (H)     No Comments Provided     Nontoxic multinodular goiter     No Comments Provided     Other bursitis of hip, right hip     No Comments Provided     Other fecal abnormalities     No Comments Provided     Other seborrheic keratosis     No Comments Provided     Other specified disorders of bone density and structure, unspecified site (CODE)     No Comments Provided     Other specified disorders of veins     No Comments Provided     Prediabetes     No Comments Provided     Primary generalized (osteo)arthritis     No Comments Provided     Pure hypercholesterolemia     No Comments Provided     Pure hyperglyceridemia     No Comments Provided     Tear of right meniscus as current injury     Torn medial and lateral meniscus right knee with popliteal cyst      Wheezing     Wheezing with possible angina       Past Surgical History:   Procedure Laterality Date     ARTHROPLASTY KNEE      6/2011,right knee arthroplasty     ARTHROPLASTY KNEE      07/2011, left knee arthroplasty     ARTHROSCOPY KNEE      Right knee surgery removal of myxoma     ARTHROSCOPY KNEE      1/16/01,Arthroscopy left knee and right knee.     CHOLECYSTECTOMY      No Comments Provided     COLONOSCOPY      1997,Colonoscopy - Next one due 01/07     COLONOSCOPY      7/31/06,Colonoscopy normal, colonoscopy due in 2016     COLONOSCOPY N/A     8/4/2016,wnl no need for fu     ESOPHAGOSCOPY, GASTROSCOPY, DUODENOSCOPY (EGD), COMBINED      No Comments Provided     ESOPHAGOSCOPY, GASTROSCOPY, DUODENOSCOPY (EGD), COMBINED      7/27/2017     FINGER SURGERY      05/07,Reconstruct left first metacarpocarpal joint     HYSTERECTOMY TOTAL ABDOMINAL, BILATERAL SALPINGO-OOPHORECTOMY, COMBINED      1/21/97,ILIANA/BSO for endometrial carcinoma     HYSTERECTOMY TOTAL ABDOMINAL, BILATERAL SALPINGO-OOPHORECTOMY, COMBINED      1999     LAPAROSCOPIC TUBAL LIGATION      No Comments Provided     LAPAROSCOPY DIAGNOSTIC (GENERAL)      No Comments Provided     OTHER SURGICAL HISTORY      06/08,,HERNIA REPAIR,Periumbilical incisional hernia, repaired     OTHER SURGICAL HISTORY      06/03,238162,HM DEXA SCAN,DXA normal     OTHER SURGICAL HISTORY      7/31/2014,98979.0,[place] COLONOSCOPY BIOPSY SINGLE OR MULTIPLE     THYROIDECTOMY      09/2004,Bilateral thyroidectomy for Hashimoto's thyroiditis       Allergies   Allergen Reactions     Bicillin C-R, Nausea     Became faint     Diatrizoate Hives     Sulfa Drugs Rash       Current Outpatient Medications   Medication Sig Dispense Refill     atorvastatin (LIPITOR) 40 MG tablet Take 1 tablet (40 mg) by mouth daily 90 tablet 3     celecoxib (CELEBREX) 100 MG capsule Take 1 capsule (100 mg) by mouth 2 times daily 180 capsule 3     clindamycin (CLEOCIN) 300 MG capsule TK 2 CS PO 1 HOUR PRIOR TO  PROCEDURE       fluocinonide (LIDEX) 0.05 % external cream Use daily as needed for rash. 30 g 11     FLUoxetine (PROZAC) 20 MG capsule TAKE 1 CAPSULE EVERY MORNING 90 capsule 3     hydrochlorothiazide (HYDRODIURIL) 25 MG tablet Take 1 tablet (25 mg) by mouth daily 90 tablet 3     levothyroxine (SYNTHROID/LEVOTHROID) 125 MCG tablet Take 1 tablet (125 mcg) by mouth daily 90 tablet 3     methylPREDNISolone (MEDROL) 32 MG tablet 32 mg PO 12 hr prior to exam, and 32 mg PO 2 hr prior to exam 2 tablet 0     metoprolol tartrate (LOPRESSOR) 100 MG tablet Take 1 tablet (100 mg) by mouth 2 times daily 180 tablet 3     Multiple Vitamin (MULTI-VITAMINS) TABS        omeprazole (PRILOSEC) 20 MG DR capsule Take 1 capsule (20 mg) by mouth daily 90 capsule 3     oxybutynin (DITROPAN) 5 MG tablet Take 1 tablet (5 mg) by mouth daily 90 tablet 3     potassium chloride ER (KLOR-CON) 10 MEQ CR tablet Take 2 tablets (20 mEq) by mouth daily 180 tablet 3     sucralfate (CARAFATE) 1 GM tablet Take 1 tablet (1 g) by mouth 3 times daily (before meals) 270 tablet 3     Ubiquinol 100 MG CAPS          Review of Systems   Constitutional: Negative.    Eyes: Negative.    Endocrine: Negative.    Allergic/Immunologic: Negative.        Physical Exam  Vitals and nursing note reviewed.   Constitutional:       General: She is not in acute distress.     Appearance: Normal appearance. She is not ill-appearing, toxic-appearing or diaphoretic.   Eyes:      General: No scleral icterus.  Skin:     Coloration: Skin is not jaundiced.   Neurological:      Mental Status: She is alert.         Assessment:        ICD-10-CM    1. Abdominal pain, epigastric  R10.13 Family Practice Referral   2. Elevated LFTs  R79.89 Family Practice Referral       Plan: She is having intermittent biliary pain.  I suspect based on the intermittent nature she may have a stricture or similar rather than a retained stone as her MRI was otherwise negative.  She is likely in need of an ERCP.   Since that she is going to be seen in [place] on Monday and is currently feeling fine, I elected not to do any lab and I encouraged her to keep her appointment as scheduled.

## 2022-05-19 ENCOUNTER — TRANSFERRED RECORDS (OUTPATIENT)
Dept: HEALTH INFORMATION MANAGEMENT | Facility: OTHER | Age: 82
End: 2022-05-19
Payer: COMMERCIAL

## 2022-07-07 ENCOUNTER — OFFICE VISIT (OUTPATIENT)
Dept: PEDIATRICS | Facility: OTHER | Age: 82
End: 2022-07-07
Attending: INTERNAL MEDICINE
Payer: COMMERCIAL

## 2022-07-07 VITALS
TEMPERATURE: 98 F | RESPIRATION RATE: 16 BRPM | OXYGEN SATURATION: 96 % | SYSTOLIC BLOOD PRESSURE: 122 MMHG | DIASTOLIC BLOOD PRESSURE: 78 MMHG | BODY MASS INDEX: 31.19 KG/M2 | HEIGHT: 62 IN | WEIGHT: 169.5 LBS | HEART RATE: 52 BPM

## 2022-07-07 DIAGNOSIS — Z00.00 ENCOUNTER FOR MEDICARE ANNUAL WELLNESS EXAM: Primary | ICD-10-CM

## 2022-07-07 DIAGNOSIS — B17.9 ACUTE HEPATITIS: ICD-10-CM

## 2022-07-07 DIAGNOSIS — Z78.0 POST-MENOPAUSAL: ICD-10-CM

## 2022-07-07 DIAGNOSIS — Z91.041 CONTRAST MEDIA ALLERGY: ICD-10-CM

## 2022-07-07 DIAGNOSIS — K21.9 GASTROESOPHAGEAL REFLUX DISEASE, UNSPECIFIED WHETHER ESOPHAGITIS PRESENT: ICD-10-CM

## 2022-07-07 DIAGNOSIS — I10 ESSENTIAL HYPERTENSION: Chronic | ICD-10-CM

## 2022-07-07 DIAGNOSIS — N39.41 URGE INCONTINENCE: ICD-10-CM

## 2022-07-07 DIAGNOSIS — F39 MOOD DISORDER (H): ICD-10-CM

## 2022-07-07 DIAGNOSIS — Z23 NEED FOR VACCINATION: ICD-10-CM

## 2022-07-07 DIAGNOSIS — E87.6 DRUG-INDUCED HYPOKALEMIA: ICD-10-CM

## 2022-07-07 DIAGNOSIS — E03.9 ACQUIRED HYPOTHYROIDISM: ICD-10-CM

## 2022-07-07 DIAGNOSIS — K22.4 ESOPHAGEAL DYSMOTILITY: ICD-10-CM

## 2022-07-07 DIAGNOSIS — K44.9 HIATAL HERNIA: ICD-10-CM

## 2022-07-07 DIAGNOSIS — M15.0 PRIMARY OSTEOARTHRITIS INVOLVING MULTIPLE JOINTS: ICD-10-CM

## 2022-07-07 DIAGNOSIS — M53.3 SACRAL BACK PAIN: ICD-10-CM

## 2022-07-07 DIAGNOSIS — T50.905A DRUG-INDUCED HYPOKALEMIA: ICD-10-CM

## 2022-07-07 DIAGNOSIS — R73.03 PREDIABETES: Chronic | ICD-10-CM

## 2022-07-07 DIAGNOSIS — E78.2 MIXED HYPERLIPIDEMIA: Chronic | ICD-10-CM

## 2022-07-07 LAB
ALBUMIN SERPL-MCNC: 3.9 G/DL (ref 3.5–5.7)
ALP SERPL-CCNC: 164 U/L (ref 34–104)
ALT SERPL W P-5'-P-CCNC: 81 U/L (ref 7–52)
ANION GAP SERPL CALCULATED.3IONS-SCNC: 7 MMOL/L (ref 3–14)
AST SERPL W P-5'-P-CCNC: 78 U/L (ref 13–39)
BILIRUB SERPL-MCNC: 1.2 MG/DL (ref 0.3–1)
BUN SERPL-MCNC: 16 MG/DL (ref 7–25)
CALCIUM SERPL-MCNC: 9.1 MG/DL (ref 8.6–10.3)
CHLORIDE BLD-SCNC: 101 MMOL/L (ref 98–107)
CHOLEST SERPL-MCNC: 172 MG/DL
CO2 SERPL-SCNC: 33 MMOL/L (ref 21–31)
CREAT SERPL-MCNC: 0.85 MG/DL (ref 0.6–1.2)
DEPRECATED CALCIDIOL+CALCIFEROL SERPL-MC: 79 UG/L (ref 30–100)
ERYTHROCYTE [DISTWIDTH] IN BLOOD BY AUTOMATED COUNT: 12 % (ref 10–15)
FASTING STATUS PATIENT QL REPORTED: NORMAL
GFR SERPL CREATININE-BSD FRML MDRD: 69 ML/MIN/1.73M2
GLUCOSE BLD-MCNC: 103 MG/DL (ref 70–105)
HBA1C MFR BLD: 6.1 % (ref 4–6.2)
HCT VFR BLD AUTO: 45.1 % (ref 35–47)
HDLC SERPL-MCNC: 50 MG/DL (ref 23–92)
HGB BLD-MCNC: 15.2 G/DL (ref 11.7–15.7)
LDLC SERPL CALC-MCNC: 97 MG/DL
MCH RBC QN AUTO: 31.3 PG (ref 26.5–33)
MCHC RBC AUTO-ENTMCNC: 33.7 G/DL (ref 31.5–36.5)
MCV RBC AUTO: 93 FL (ref 78–100)
NONHDLC SERPL-MCNC: 122 MG/DL
PLATELET # BLD AUTO: 223 10E3/UL (ref 150–450)
POTASSIUM BLD-SCNC: 3.6 MMOL/L (ref 3.5–5.1)
PROT SERPL-MCNC: 6.5 G/DL (ref 6.4–8.9)
RBC # BLD AUTO: 4.85 10E6/UL (ref 3.8–5.2)
SODIUM SERPL-SCNC: 141 MMOL/L (ref 134–144)
TRIGL SERPL-MCNC: 123 MG/DL
TSH SERPL DL<=0.005 MIU/L-ACNC: 0.38 MU/L (ref 0.4–4)
WBC # BLD AUTO: 7.2 10E3/UL (ref 4–11)

## 2022-07-07 PROCEDURE — 85027 COMPLETE CBC AUTOMATED: CPT | Mod: ZL | Performed by: INTERNAL MEDICINE

## 2022-07-07 PROCEDURE — 91305 COVID-19,PF,PFIZER (12+ YRS): CPT

## 2022-07-07 PROCEDURE — 36415 COLL VENOUS BLD VENIPUNCTURE: CPT | Mod: ZL | Performed by: INTERNAL MEDICINE

## 2022-07-07 PROCEDURE — 80061 LIPID PANEL: CPT | Mod: ZL | Performed by: INTERNAL MEDICINE

## 2022-07-07 PROCEDURE — G0439 PPPS, SUBSEQ VISIT: HCPCS | Performed by: INTERNAL MEDICINE

## 2022-07-07 PROCEDURE — 80053 COMPREHEN METABOLIC PANEL: CPT | Mod: ZL | Performed by: INTERNAL MEDICINE

## 2022-07-07 PROCEDURE — 82306 VITAMIN D 25 HYDROXY: CPT | Mod: ZL | Performed by: INTERNAL MEDICINE

## 2022-07-07 PROCEDURE — 83036 HEMOGLOBIN GLYCOSYLATED A1C: CPT | Mod: ZL | Performed by: INTERNAL MEDICINE

## 2022-07-07 PROCEDURE — 84443 ASSAY THYROID STIM HORMONE: CPT | Mod: ZL | Performed by: INTERNAL MEDICINE

## 2022-07-07 RX ORDER — SUCRALFATE 1 G/1
1 TABLET ORAL
Qty: 270 TABLET | Refills: 3 | Status: SHIPPED | OUTPATIENT
Start: 2022-07-07 | End: 2023-07-25

## 2022-07-07 RX ORDER — OXYBUTYNIN CHLORIDE 5 MG/1
5 TABLET ORAL DAILY
Qty: 90 TABLET | Refills: 3 | Status: SHIPPED | OUTPATIENT
Start: 2022-07-07 | End: 2023-07-25

## 2022-07-07 RX ORDER — METOPROLOL TARTRATE 100 MG
100 TABLET ORAL 2 TIMES DAILY
Qty: 180 TABLET | Refills: 3 | Status: SHIPPED | OUTPATIENT
Start: 2022-07-07 | End: 2023-07-25

## 2022-07-07 RX ORDER — LEVOTHYROXINE SODIUM 125 UG/1
125 TABLET ORAL DAILY
Qty: 90 TABLET | Refills: 3 | Status: SHIPPED | OUTPATIENT
Start: 2022-07-07 | End: 2022-07-07 | Stop reason: DRUGHIGH

## 2022-07-07 RX ORDER — POTASSIUM CHLORIDE 750 MG/1
20 TABLET, EXTENDED RELEASE ORAL DAILY
Qty: 180 TABLET | Refills: 3 | Status: SHIPPED | OUTPATIENT
Start: 2022-07-07 | End: 2023-07-25

## 2022-07-07 RX ORDER — ATORVASTATIN CALCIUM 40 MG/1
40 TABLET, FILM COATED ORAL DAILY
Qty: 90 TABLET | Refills: 3 | Status: SHIPPED | OUTPATIENT
Start: 2022-07-07 | End: 2023-07-25

## 2022-07-07 RX ORDER — HYDROCHLOROTHIAZIDE 25 MG/1
25 TABLET ORAL DAILY
Qty: 90 TABLET | Refills: 3 | Status: SHIPPED | OUTPATIENT
Start: 2022-07-07 | End: 2023-07-25

## 2022-07-07 RX ORDER — METHYLPREDNISOLONE 32 MG/1
TABLET ORAL
Qty: 2 TABLET | Refills: 0 | Status: CANCELLED | OUTPATIENT
Start: 2022-07-07

## 2022-07-07 RX ORDER — CELECOXIB 100 MG/1
100 CAPSULE ORAL 2 TIMES DAILY
Qty: 180 CAPSULE | Refills: 3 | Status: SHIPPED | OUTPATIENT
Start: 2022-07-07 | End: 2023-05-19

## 2022-07-07 RX ORDER — LEVOTHYROXINE SODIUM 112 UG/1
112 TABLET ORAL DAILY
Qty: 90 TABLET | Refills: 4 | Status: SHIPPED | OUTPATIENT
Start: 2022-07-07 | End: 2023-07-18

## 2022-07-07 ASSESSMENT — ENCOUNTER SYMPTOMS
COUGH: 0
DIZZINESS: 0
ABDOMINAL PAIN: 0
PARESTHESIAS: 0
HEARTBURN: 1
HEADACHES: 0
ARTHRALGIAS: 0
DIARRHEA: 0
SORE THROAT: 0
BREAST MASS: 0
CHILLS: 0
DYSURIA: 0
SHORTNESS OF BREATH: 0
NERVOUS/ANXIOUS: 0
CONSTIPATION: 0
NAUSEA: 0
PALPITATIONS: 0
FREQUENCY: 0
HEMATOCHEZIA: 0
EYE PAIN: 0
JOINT SWELLING: 0
HEMATURIA: 0
WEAKNESS: 0
MYALGIAS: 0
FEVER: 0

## 2022-07-07 ASSESSMENT — ACTIVITIES OF DAILY LIVING (ADL): CURRENT_FUNCTION: NO ASSISTANCE NEEDED

## 2022-07-07 ASSESSMENT — PAIN SCALES - GENERAL: PAINLEVEL: NO PAIN (0)

## 2022-07-07 NOTE — NURSING NOTE
Chief Complaint   Patient presents with     Physical     Medicare       Medication Reconciliation: complete    Donna Verde, LPN

## 2022-07-07 NOTE — PATIENT INSTRUCTIONS
Patient Education   Personalized Prevention Plan  You are due for the preventive services outlined below.  Your care team is available to assist you in scheduling these services.  If you have already completed any of these items, please share that information with your care team to update in your medical record.  Health Maintenance Due   Topic Date Due     Depression Action Plan  Never done     Depression Assessment  01/06/2022     COVID-19 Vaccine (4 - Booster for Pfizer series) 02/04/2022     Diptheria Tetanus Pertussis (DTAP/TDAP/TD) Vaccine (2 - Td or Tdap) 06/07/2022       Urinary Incontinence, Female (Adult)   Urinary incontinence means loss of bladder control. This problem affects many women, especially as they get older. If you have incontinence, you may be embarrassed to ask for help. But know that this problem can be treated.   Types of Incontinence  There are different types of incontinence. Two of the main types are described here. You can have more than one type.     Stress incontinence. With this type, urine leaks when pressure (stress) is put on the bladder. This may happen when you cough, sneeze, or laugh. Stress incontinence most often occurs because the pelvic floor muscles that support the bladder and urethra are weak. This can happen after pregnancy and vaginal childbirth or a hysterectomy. It can also be due to excess body weight or hormone changes.    Urge incontinence (also called overactive bladder). With this type, a sudden urge to urinate is felt often. This may happen even though there may not be much urine in the bladder. The need to urinate often during the night is common. Urge incontinence most often occurs because of bladder spasms. This may be due to bladder irritation or infection. Damage to bladder nerves or pelvic muscles, constipation, and certain medicines can also lead to urge incontinence.  Treatment depends on the cause. Further evaluation is needed to find the type you  have. This will likely include an exam and certain tests. Based on the results, you and your healthcare provider can then plan treatment. Until a diagnosis is made, the home care tips below can help ease symptoms.   Home care    Do pelvic floor muscle exercises, if they are prescribed. The pelvic floor muscles help support the bladder and urethra. Many women find that their symptoms improve when doing special exercises that strengthen these muscles. To do the exercises, contract the muscles you would use to stop your stream of urine. But do this when you re not urinating. Hold for 10 seconds, then relax. Repeat 10 to 20 times in a row, at least 3 times a day. Your healthcare provider may give you other instructions for how to do the exercises and how often.    Keep a bladder diary. This helps track how often and how much you urinate over a set period of time. Bring this diary with you to your next visit with the provider. The information can help your provider learn more about your bladder problem.    Lose weight, if advised to by your provider. Extra weight puts pressure on the bladder. Your provider can help you create a weight-loss plan that s right for you. This may include exercising more and making certain diet changes.    Don't have foods and drinks that may irritate the bladder. These can include alcohol and caffeinated drinks.    Quit smoking. Smoking and other tobacco use can lead to a long-term (chronic) cough that strains the pelvic floor muscles. Smoking may also damage the bladder and urethra. Talk with your provider about treatments or methods you can use to quit smoking.    If drinking large amounts of fluid makes you have symptoms, you may be advised to limit your fluid intake. You may also be advised to drink most of your fluids during the day and to limit fluids at night.    If you re worried about urine leakage or accidents, you may wear absorbent pads to catch urine. Change the pads often. This  helps reduce discomfort. It may also reduce the risk of skin or bladder infections.    Follow-up care  Follow up with your healthcare provider, or as directed. It may take some to find the right treatment for your problem. But healthy lifestyle changes can be made right away. These include such things as exercising on a regular basis, eating a healthy diet, losing weight (if needed), and quitting smoking. Your treatment plan may include special therapies or medicines. Certain procedures or surgery may also be options. Talk about any questions you have with your provider.   When to seek medical advice  Call the healthcare provider right away if any of these occur:    Fever of 100.4 F (38 C) or higher, or as directed by your provider    Bladder pain or fullness    Belly swelling    Nausea or vomiting    Back pain    Weakness, dizziness, or fainting  [place] last reviewed this educational content on 1/1/2020 2000-2021 [employer]. All rights reserved. This information is not intended as a substitute for professional medical care. Always follow your healthcare professional's instructions.

## 2022-07-07 NOTE — PROGRESS NOTES
ICD-10-CM    1. Encounter for Medicare annual wellness exam  Z00.00    2. Mixed hyperlipidemia  E78.2 atorvastatin (LIPITOR) 40 MG tablet     Lipid Profile     Lipid Profile   3. Primary osteoarthritis involving multiple joints  M89.49 celecoxib (CELEBREX) 100 MG capsule   4. Mood disorder (H)  F39 FLUoxetine (PROZAC) 20 MG capsule   5. Essential hypertension  I10 hydrochlorothiazide (HYDRODIURIL) 25 MG tablet     metoprolol tartrate (LOPRESSOR) 100 MG tablet   6. Acquired hypothyroidism  E03.9 TSH     TSH     levothyroxine (SYNTHROID/LEVOTHROID) 112 MCG tablet     DISCONTINUED: levothyroxine (SYNTHROID/LEVOTHROID) 125 MCG tablet   7. Contrast media allergy  Z91.041    8. Gastroesophageal reflux disease, unspecified whether esophagitis present  K21.9 omeprazole (PRILOSEC) 20 MG DR capsule   9. Esophageal dysmotility  K22.4 omeprazole (PRILOSEC) 20 MG DR capsule   10. Hiatal hernia  K44.9 omeprazole (PRILOSEC) 20 MG DR capsule     sucralfate (CARAFATE) 1 GM tablet   11. Urge incontinence  N39.41 oxybutynin (DITROPAN) 5 MG tablet   12. Drug-induced hypokalemia  E87.6 potassium chloride ER (KLOR-CON) 10 MEQ CR tablet    T50.905A    13. Prediabetes  R73.03 Comprehensive metabolic panel     Hemoglobin A1c     Comprehensive metabolic panel     Hemoglobin A1c   14. Sacral back pain  M53.3 Physical Therapy Referral   15. Acute hepatitis  B17.9 Comprehensive metabolic panel     CBC with platelets     Comprehensive metabolic panel     CBC with platelets   16. Post-menopausal  Z78.0 Vitamin D Deficiency     Vitamin D Deficiency   17. Body mass index (BMI) 31.0-31.9, adult   Z68.31 Vitamin D Deficiency     Vitamin D Deficiency   18. Need for vaccination  Z23 Tdap, tetanus-diptheria-acell pertussis, (BOOSTRIX) 5-2.5-18.5 LF-MCG/0.5 SUSP injection     Signed, Siddharth Kim MD, FAAP, FACP  Internal Medicine & Pediatrics      Vanesa Cleary is a 82 year old, presenting for the following health issues:  Physical  "(Medicare)      Healthy Habits:     In general, how would you rate your overall health?  Good    Frequency of exercise:  4-5 days/week    Duration of exercise:  45-60 minutes    Do you usually eat at least 4 servings of fruit and vegetables a day, include whole grains    & fiber and avoid regularly eating high fat or \"junk\" foods?  Yes    Taking medications regularly:  Yes    Ability to successfully perform activities of daily living:  No assistance needed    Home Safety:  No safety concerns identified    Hearing Impairment:  No hearing concerns    In the past 6 months, have you been bothered by leaking of urine? Yes    In general, how would you rate your overall mental or emotional health?  Good      PHQ-2 Total Score: 0    Additional concerns today:  Yes       Annual Wellness Visit  Patient has been advised of split billing requirements and indicates understanding: Yes     Are you in the first 12 months of your Medicare Part B coverage?  No    Physical Health:    See above     Mental Health:    See above   PHQ-2 Score: 0    Do you feel safe in your environment? Yes    Have you ever done Advance Care Planning? (For example, a Health Directive, POLST, or a discussion with a medical provider or your loved ones about your wishes)? Yes, patient states has an Advance Care Planning document and will bring a copy to the clinic.    Fall risk:  Fallen 2 or more times in the past year?: No  Any fall with injury in the past year?: No    Cognitive Screenin) Repeat 3 items (Leader, Season, Table)    2) Clock draw: NORMAL  3) 3 item recall: Recalls 3 objects  Results: 3 items recalled: COGNITIVE IMPAIRMENT LESS LIKELY    Mini-CogTM Copyright JUANI Wilcox. Licensed by the author for use in [facility]; reprinted with permission ([email or web address]@.[email or web address]). All rights reserved.      Do you have sleep apnea, excessive snoring or daytime drowsiness?: no    Current providers sharing in care for this patient include:   Patient Care " "Team:  Siddharth Kim MD as PCP - General (Internal Medicine)  Siddharth Kim MD as Assigned PCP  Segun Wood MD as Assigned Neuroscience Provider  Pantera Kaplan MD as Assigned Musculoskeletal Provider  Nguyễn Ramirez MD as Assigned Surgical Provider    Patient has been advised of split billing requirements and indicates understanding:       Review of Systems         Objective    /78 (BP Location: Right arm, Patient Position: Sitting, Cuff Size: Adult Regular)   Pulse 52   Temp 98  F (36.7  C) (Tympanic)   Resp 16   Ht 1.575 m (5' 2\")   Wt 76.9 kg (169 lb 8 oz)   LMP  (LMP Unknown)   SpO2 96%   Breastfeeding No   BMI 31.00 kg/m    Body mass index is 31 kg/m .  Physical Exam   HEENT: No carotid bruits.  Horizontal surgical scar present.  No thyromegaly.  Cardiovascular: Regular, no murmur  Pulmonary: Clear                Review current opioid prescriptions    For a patient with a current opioid prescription:    Reviewed potential Opioid Use Disorder (OUD) risk factors: Yes     Evaluate their pain severity and current treatment plan:     Provide information on non-opioid treatment options:    Refer to a specialist, as appropriate:    Get more information on pain management in the HHS Pain Management Best Practices Inter-agency Task Force Report    Screen for potential Substance Use Disorders (SUDs)    Reviewed the patient s potential risk factors for SUDs: Yes     Refer to treatment or specialist, as appropriate:     A screening tool isn t required but you may use one:  Find more information in the [employer] Drug [employer] Screening and Assessment Tools Chart      .  ..  "

## 2022-09-17 ENCOUNTER — HEALTH MAINTENANCE LETTER (OUTPATIENT)
Age: 82
End: 2022-09-17

## 2022-10-26 ENCOUNTER — ALLIED HEALTH/NURSE VISIT (OUTPATIENT)
Dept: FAMILY MEDICINE | Facility: OTHER | Age: 82
End: 2022-10-26
Attending: INTERNAL MEDICINE
Payer: MEDICARE

## 2022-10-26 DIAGNOSIS — Z23 NEED FOR PROPHYLACTIC VACCINATION AND INOCULATION AGAINST INFLUENZA: Primary | ICD-10-CM

## 2022-10-26 PROCEDURE — G0008 ADMIN INFLUENZA VIRUS VAC: HCPCS

## 2022-11-02 ENCOUNTER — IMMUNIZATION (OUTPATIENT)
Dept: FAMILY MEDICINE | Facility: OTHER | Age: 82
End: 2022-11-02
Attending: INTERNAL MEDICINE
Payer: MEDICARE

## 2022-11-02 DIAGNOSIS — Z23 HIGH PRIORITY FOR 2019-NCOV VACCINE: Primary | ICD-10-CM

## 2022-11-02 PROCEDURE — 0124A COVID-19,PF,PFIZER BOOSTER BIVALENT: CPT

## 2022-11-16 ENCOUNTER — HOSPITAL ENCOUNTER (OUTPATIENT)
Dept: MAMMOGRAPHY | Facility: OTHER | Age: 82
Discharge: HOME | End: 2022-11-16
Attending: INTERNAL MEDICINE | Admitting: INTERNAL MEDICINE
Payer: MEDICARE

## 2022-11-16 DIAGNOSIS — Z12.31 VISIT FOR SCREENING MAMMOGRAM: ICD-10-CM

## 2022-11-16 PROCEDURE — 77067 SCR MAMMO BI INCL CAD: CPT

## 2023-05-15 DIAGNOSIS — M15.0 PRIMARY OSTEOARTHRITIS INVOLVING MULTIPLE JOINTS: ICD-10-CM

## 2023-05-19 RX ORDER — CELECOXIB 100 MG/1
CAPSULE ORAL
Qty: 180 CAPSULE | Refills: 3 | Status: SHIPPED | OUTPATIENT
Start: 2023-05-19 | End: 2023-07-25

## 2023-05-19 NOTE — TELEPHONE ENCOUNTER
[facility] sent Rx request for the following:    CELECOXIB 100 MG Capsule  Last Prescription Date:   7/7/22  Last Fill Qty/Refills:         180, R-3    Last Office Visit:              7/7/22   Future Office visit:           None   Mckenna Kruse RN on 5/19/2023 at 7:48 AM

## 2023-07-05 DIAGNOSIS — K22.4 ESOPHAGEAL DYSMOTILITY: ICD-10-CM

## 2023-07-05 DIAGNOSIS — K21.9 GASTROESOPHAGEAL REFLUX DISEASE, UNSPECIFIED WHETHER ESOPHAGITIS PRESENT: ICD-10-CM

## 2023-07-05 DIAGNOSIS — K44.9 HIATAL HERNIA: ICD-10-CM

## 2023-07-15 DIAGNOSIS — E03.9 ACQUIRED HYPOTHYROIDISM: ICD-10-CM

## 2023-07-18 RX ORDER — LEVOTHYROXINE SODIUM 112 UG/1
TABLET ORAL
Qty: 90 TABLET | Refills: 3 | Status: SHIPPED | OUTPATIENT
Start: 2023-07-18 | End: 2023-07-25 | Stop reason: DRUGHIGH

## 2023-07-18 ASSESSMENT — ENCOUNTER SYMPTOMS
PARESTHESIAS: 0
ARTHRALGIAS: 1
COUGH: 1
NAUSEA: 0
DIARRHEA: 0
MYALGIAS: 0
NERVOUS/ANXIOUS: 0
PALPITATIONS: 0
FREQUENCY: 0
CHILLS: 0
HEMATOCHEZIA: 0
WEAKNESS: 0
SHORTNESS OF BREATH: 0
SORE THROAT: 0
HEADACHES: 0
DYSURIA: 0
FEVER: 0
EYE PAIN: 0
HEARTBURN: 1
JOINT SWELLING: 0
DIZZINESS: 0
ABDOMINAL PAIN: 1
CONSTIPATION: 0
HEMATURIA: 0

## 2023-07-18 ASSESSMENT — ACTIVITIES OF DAILY LIVING (ADL): CURRENT_FUNCTION: NO ASSISTANCE NEEDED

## 2023-07-18 NOTE — TELEPHONE ENCOUNTER
"[id number]  sent Rx request for the following:      Requested Prescriptions   Pending Prescriptions Disp Refills     levothyroxine (SYNTHROID/LEVOTHROID) 112 MCG tablet [Pharmacy Med Name: Levothyroxine Sodium 112 MCG Oral Tablet] 90 tablet 0     Sig: Take 1 tablet by mouth once daily       Thyroid Protocol Failed - 7/15/2023 10:05 AM        Failed - Recent (12 mo) or future (30 days) visit within the authorizing provider's specialty     Patient has had an office visit with the authorizing provider or a provider within the authorizing providers department within the previous 12 mos or has a future within next 30 days. See \"Patient Info\" tab in inbasket, or \"Choose Columns\" in Meds & Orders section of the refill encounter.          Failed - Normal TSH on file in past 12 months     Recent Labs   Lab Test 07/07/22  1004   TSH 0.38*        Last Prescription Date:   07/07/22  Last Fill Qty/Refills:         90, R-4  Last Office Visit:              07/07/22   Future Office visit:             Next 5 appointments (look out 90 days)    Jul 25, 2023  8:00 AM  PHYSICAL with SHAUN Adams [facility] ([facility] ) [address]  [place] [id number] [phone]        Loli Jones RN on 7/18/2023 at 3:57 PM      "

## 2023-07-25 ENCOUNTER — OFFICE VISIT (OUTPATIENT)
Dept: FAMILY MEDICINE | Facility: OTHER | Age: 83
End: 2023-07-25
Attending: NURSE PRACTITIONER
Payer: COMMERCIAL

## 2023-07-25 VITALS
HEIGHT: 62 IN | OXYGEN SATURATION: 96 % | HEART RATE: 50 BPM | DIASTOLIC BLOOD PRESSURE: 74 MMHG | WEIGHT: 173 LBS | BODY MASS INDEX: 31.83 KG/M2 | RESPIRATION RATE: 16 BRPM | TEMPERATURE: 97.8 F | SYSTOLIC BLOOD PRESSURE: 126 MMHG

## 2023-07-25 DIAGNOSIS — E78.2 MIXED HYPERLIPIDEMIA: Chronic | ICD-10-CM

## 2023-07-25 DIAGNOSIS — T50.905A DRUG-INDUCED HYPOKALEMIA: ICD-10-CM

## 2023-07-25 DIAGNOSIS — I10 ESSENTIAL HYPERTENSION: Chronic | ICD-10-CM

## 2023-07-25 DIAGNOSIS — C54.9 MALIGNANT NEOPLASM OF CORPUS UTERI, EXCEPT ISTHMUS (H): ICD-10-CM

## 2023-07-25 DIAGNOSIS — N39.41 URGE INCONTINENCE: ICD-10-CM

## 2023-07-25 DIAGNOSIS — L82.1 SEBORRHEIC KERATOSES: ICD-10-CM

## 2023-07-25 DIAGNOSIS — F39 MOOD DISORDER (H): ICD-10-CM

## 2023-07-25 DIAGNOSIS — K21.9 GASTROESOPHAGEAL REFLUX DISEASE, UNSPECIFIED WHETHER ESOPHAGITIS PRESENT: Chronic | ICD-10-CM

## 2023-07-25 DIAGNOSIS — M15.0 PRIMARY OSTEOARTHRITIS INVOLVING MULTIPLE JOINTS: ICD-10-CM

## 2023-07-25 DIAGNOSIS — K73.9 CHRONIC HEPATITIS, UNSPECIFIED (H): ICD-10-CM

## 2023-07-25 DIAGNOSIS — K44.9 HIATAL HERNIA: ICD-10-CM

## 2023-07-25 DIAGNOSIS — E03.9 HYPOTHYROIDISM, UNSPECIFIED TYPE: ICD-10-CM

## 2023-07-25 DIAGNOSIS — E87.6 DRUG-INDUCED HYPOKALEMIA: ICD-10-CM

## 2023-07-25 DIAGNOSIS — L98.9 SKIN LESION: ICD-10-CM

## 2023-07-25 DIAGNOSIS — H61.23 EXCESSIVE CERUMEN IN BOTH EAR CANALS: ICD-10-CM

## 2023-07-25 DIAGNOSIS — Z00.00 ENCOUNTER FOR MEDICARE ANNUAL WELLNESS EXAM: Primary | ICD-10-CM

## 2023-07-25 LAB
ALBUMIN SERPL BCG-MCNC: 3.9 G/DL (ref 3.5–5.2)
ALP SERPL-CCNC: 190 U/L (ref 35–104)
ALT SERPL W P-5'-P-CCNC: 81 U/L (ref 0–50)
ANION GAP SERPL CALCULATED.3IONS-SCNC: 11 MMOL/L (ref 7–15)
AST SERPL W P-5'-P-CCNC: 49 U/L (ref 0–45)
BILIRUB SERPL-MCNC: 0.9 MG/DL
BUN SERPL-MCNC: 11.1 MG/DL (ref 8–23)
CALCIUM SERPL-MCNC: 9.2 MG/DL (ref 8.8–10.2)
CHLORIDE SERPL-SCNC: 98 MMOL/L (ref 98–107)
CHOLEST SERPL-MCNC: 174 MG/DL
CREAT SERPL-MCNC: 0.9 MG/DL (ref 0.51–0.95)
DEPRECATED HCO3 PLAS-SCNC: 31 MMOL/L (ref 22–29)
GFR SERPL CREATININE-BSD FRML MDRD: 64 ML/MIN/1.73M2
GLUCOSE SERPL-MCNC: 112 MG/DL (ref 70–99)
HDLC SERPL-MCNC: 56 MG/DL
LDLC SERPL CALC-MCNC: 79 MG/DL
NONHDLC SERPL-MCNC: 118 MG/DL
POTASSIUM SERPL-SCNC: 3.7 MMOL/L (ref 3.4–5.3)
PROT SERPL-MCNC: 6.8 G/DL (ref 6.4–8.3)
SODIUM SERPL-SCNC: 140 MMOL/L (ref 136–145)
T4 FREE SERPL-MCNC: 1.32 NG/DL (ref 0.9–1.7)
TRIGL SERPL-MCNC: 195 MG/DL
TSH SERPL DL<=0.005 MIU/L-ACNC: 7.16 UIU/ML (ref 0.3–4.2)

## 2023-07-25 PROCEDURE — G0439 PPPS, SUBSEQ VISIT: HCPCS | Performed by: NURSE PRACTITIONER

## 2023-07-25 PROCEDURE — 99213 OFFICE O/P EST LOW 20 MIN: CPT | Mod: 25 | Performed by: NURSE PRACTITIONER

## 2023-07-25 PROCEDURE — 84443 ASSAY THYROID STIM HORMONE: CPT | Mod: ZL | Performed by: NURSE PRACTITIONER

## 2023-07-25 PROCEDURE — 36415 COLL VENOUS BLD VENIPUNCTURE: CPT | Mod: ZL | Performed by: NURSE PRACTITIONER

## 2023-07-25 PROCEDURE — G0463 HOSPITAL OUTPT CLINIC VISIT: HCPCS

## 2023-07-25 PROCEDURE — 80061 LIPID PANEL: CPT | Mod: ZL | Performed by: NURSE PRACTITIONER

## 2023-07-25 PROCEDURE — 82374 ASSAY BLOOD CARBON DIOXIDE: CPT | Mod: ZL | Performed by: NURSE PRACTITIONER

## 2023-07-25 PROCEDURE — 84439 ASSAY OF FREE THYROXINE: CPT | Mod: ZL | Performed by: NURSE PRACTITIONER

## 2023-07-25 RX ORDER — SUCRALFATE 1 G/1
1 TABLET ORAL
Qty: 270 TABLET | Refills: 3 | Status: SHIPPED | OUTPATIENT
Start: 2023-07-25 | End: 2024-07-19

## 2023-07-25 RX ORDER — HYDROCHLOROTHIAZIDE 25 MG/1
25 TABLET ORAL DAILY
Qty: 90 TABLET | Refills: 3 | Status: SHIPPED | OUTPATIENT
Start: 2023-07-25 | End: 2024-01-09

## 2023-07-25 RX ORDER — OXYBUTYNIN CHLORIDE 5 MG/1
5 TABLET ORAL DAILY
Qty: 90 TABLET | Refills: 3 | Status: SHIPPED | OUTPATIENT
Start: 2023-07-25 | End: 2024-05-30

## 2023-07-25 RX ORDER — POTASSIUM CHLORIDE 750 MG/1
20 TABLET, EXTENDED RELEASE ORAL DAILY
Qty: 180 TABLET | Refills: 3 | Status: SHIPPED | OUTPATIENT
Start: 2023-07-25 | End: 2024-07-19

## 2023-07-25 RX ORDER — CELECOXIB 100 MG/1
100 CAPSULE ORAL 2 TIMES DAILY
Qty: 180 CAPSULE | Refills: 3 | Status: SHIPPED | OUTPATIENT
Start: 2023-07-25 | End: 2024-05-03

## 2023-07-25 RX ORDER — LEVOTHYROXINE SODIUM 125 UG/1
125 TABLET ORAL DAILY
Qty: 90 TABLET | Refills: 3 | Status: SHIPPED | OUTPATIENT
Start: 2023-07-25 | End: 2024-05-30

## 2023-07-25 RX ORDER — ATORVASTATIN CALCIUM 40 MG/1
40 TABLET, FILM COATED ORAL DAILY
Qty: 90 TABLET | Refills: 3 | Status: SHIPPED | OUTPATIENT
Start: 2023-07-25 | End: 2024-05-03

## 2023-07-25 RX ORDER — METOPROLOL TARTRATE 100 MG
100 TABLET ORAL 2 TIMES DAILY
Qty: 180 TABLET | Refills: 3 | Status: SHIPPED | OUTPATIENT
Start: 2023-07-25 | End: 2024-01-09 | Stop reason: DRUGHIGH

## 2023-07-25 ASSESSMENT — ENCOUNTER SYMPTOMS
HEMATURIA: 0
DYSURIA: 0
HEARTBURN: 1
DIZZINESS: 0
EYE PAIN: 0
DIARRHEA: 0
FREQUENCY: 0
SORE THROAT: 0
JOINT SWELLING: 0
HEADACHES: 0
ABDOMINAL PAIN: 1
MYALGIAS: 0
COUGH: 1
ARTHRALGIAS: 1
NERVOUS/ANXIOUS: 0
SHORTNESS OF BREATH: 0
FEVER: 0
CHILLS: 0
CONSTIPATION: 0
WEAKNESS: 0
PARESTHESIAS: 0
PALPITATIONS: 0
NAUSEA: 0
HEMATOCHEZIA: 0

## 2023-07-25 ASSESSMENT — PATIENT HEALTH QUESTIONNAIRE - PHQ9
10. IF YOU CHECKED OFF ANY PROBLEMS, HOW DIFFICULT HAVE THESE PROBLEMS MADE IT FOR YOU TO DO YOUR WORK, TAKE CARE OF THINGS AT HOME, OR GET ALONG WITH OTHER PEOPLE: NOT DIFFICULT AT ALL
SUM OF ALL RESPONSES TO PHQ QUESTIONS 1-9: 0
SUM OF ALL RESPONSES TO PHQ QUESTIONS 1-9: 0

## 2023-07-25 ASSESSMENT — ACTIVITIES OF DAILY LIVING (ADL): CURRENT_FUNCTION: NO ASSISTANCE NEEDED

## 2023-07-25 ASSESSMENT — PAIN SCALES - GENERAL: PAINLEVEL: NO PAIN (0)

## 2023-07-25 NOTE — NURSING NOTE
Patient presents today for annual medicare wellness exam.    Medication Reconciliation Complete    Shelli Jones LPN  7/25/2023 8:00 AM

## 2023-07-25 NOTE — LETTER
My Depression Action Plan  Name: Madiha Beltran   Date of Birth 1940  Date: 7/26/2023    My doctor: Siddharth Kim   My clinic: [facility]  [address]  [place] [id number] [phone]            GREEN    ZONE   Good Control    What it looks like:   Things are going generally well. You have normal ups and downs. You may even feel depressed from time to time, but bad moods usually last less than a day.   What you need to do:  Continue to care for yourself (see self care plan)  Check your depression survival kit and update it as needed  Follow your physician s recommendations including any medication.  Do not stop taking medication unless you consult with your physician first.             YELLOW         ZONE Getting Worse    What it looks like:   Depression is starting to interfere with your life.   It may be hard to get out of bed; you may be starting to isolate yourself from others.  Symptoms of depression are starting to last most all day and this has happened for several days.   You may have suicidal thoughts but they are not constant.   What you need to do:     Call your care team. Your response to treatment will improve if you keep your care team informed of your progress. Yellow periods are signs an adjustment may need to be made.     Continue your self-care.  Just get dressed and ready for the day.  Don't give yourself time to talk yourself out of it.    Talk to someone in your support network.    Open up your Depression Self-Care Plan/Wellness Kit.             RED    ZONE Medical Alert - Get Help    What it looks like:   Depression is seriously interfering with your life.   You may experience these or other symptoms: You can t get out of bed most days, can t work or engage in other necessary activities, you have trouble taking care of basic hygiene, or basic responsibilities, thoughts of suicide or death that will not go away, self-injurious  behavior.     What you need to do:  Call your care team and request a same-day appointment. If they are not available (weekends or after hours) call your local crisis line, emergency room or 911.          Depression Self-Care Plan / Wellness Kit    Many people find that medication and therapy are helpful treatments for managing depression. In addition, making small changes to your everyday life can help to boost your mood and improve your wellbeing. Below are some tips for you to consider. Be sure to talk with your medical provider and/or behavioral health consultant if your symptoms are worsening or not improving.     Sleep   Sleep hygiene  means all of the habits that support good, restful sleep. It includes maintaining a consistent bedtime and wake time, using your bedroom only for sleeping or sex, and keeping the bedroom dark and free of distractions like a computer, smartphone, or television.     Develop a Healthy Routine  Maintain good hygiene. Get out of bed in the morning, make your bed, brush your teeth, take a shower, and get dressed. Don t spend too much time viewing media that makes you feel stressed. Find time to relax each day.    Exercise  Get some form of exercise every day. This will help reduce pain and release endorphins, the  feel good  chemicals in your brain. It can be as simple as just going for a walk or doing some gardening, anything that will get you moving.      Diet  Strive to eat healthy foods, including fruits and vegetables. Drink plenty of water. Avoid excessive sugar, caffeine, alcohol, and other mood-altering substances.     Stay Connected with Others  Stay in touch with friends and family members.    Manage Your Mood  Try deep breathing, massage therapy, biofeedback, or meditation. Take part in fun activities when you can. Try to find something to smile about each day.     Psychotherapy  Be open to working with a therapist if your provider recommends it.     Medication  Be sure to  take your medication as prescribed. Most anti-depressants need to be taken every day. It usually takes several weeks for medications to work. Not all medicines work for all people. It is important to follow-up with your provider to make sure you have a treatment plan that is working for you. Do not stop your medication abruptly without first discussing it with your provider.    Crisis Resources   These hotlines are for both adults and children. They and are open 24 hours a day, 7 days a week unless noted otherwise.    National Suicide Prevention Lifeline   988 or 5-461-746-XOWC (6059)    Crisis Text Line    www.crisistextline.org  Text HOME to 202502 from anywhere in the [place], anytime, about any type of crisis. A live, trained crisis counselor will receive the text and respond quickly.    [place] Lifeline for LGBTQ Youth  A national crisis intervention and suicide lifeline for LGBTQ youth under 25. Provides a safe place to talk without judgement. Call 1-284.159.3216; text START to 135712 or visit www.thetrevorproject.org to talk to a trained counselor.    For [facility] crisis numbers, visit the [facility] website at:  https://[place].gov/dhs/people-we-serve/adults/health-care/mental-health/resources/crisis-contacts.jsp

## 2023-07-25 NOTE — PATIENT INSTRUCTIONS
[facility]  Patient Education   Personalized Prevention Plan  You are due for the preventive services outlined below.  Your care team is available to assist you in scheduling these services.  If you have already completed any of these items, please share that information with your care team to update in your medical record.  Health Maintenance Due   Topic Date Due     Depression Action Plan  Never done     COVID-19 Vaccine (6 - Pfizer series) 03/02/2023     Annual Wellness Visit  07/07/2023     Thyroid Function Lab  07/07/2023     Bladder Training: Care Instructions  Your Care Instructions     Bladder training is used to treat urge incontinence and stress incontinence. Urge incontinence means that the need to urinate comes on so fast that you can't get to a toilet in time. Stress incontinence means that you leak urine because of pressure on your bladder. For example, it may happen when you laugh, cough, or lift something heavy.  Bladder training can increase how long you can wait before you have to urinate. It can also help your bladder hold more urine. And it can give you better control over the urge to urinate.  It is important to remember that bladder training takes a few weeks to a few months to make a difference. You may not see results right away, but don't give up.  Follow-up care is a key part of your treatment and safety. Be sure to make and go to all appointments, and call your doctor if you are having problems. It's also a good idea to know your test results and keep a list of the medicines you take.  How can you care for yourself at home?  Work with your doctor to come up with a bladder training program that is right for you. You may use one or more of the following methods.  Delayed urination  In the beginning, try to keep from urinating for 5 minutes after you first feel the need to go.  While you wait, take deep, slow breaths to relax. Kegel exercises can also help you  "delay the need to go to the bathroom.  After some practice, when you can easily wait 5 minutes to urinate, try to wait 10 minutes before you urinate.  Slowly increase the waiting period until you are able to control when you have to urinate.  Scheduled urination  Empty your bladder when you first wake up in the morning.  Schedule times throughout the day when you will urinate.  Start by going to the bathroom every hour, even if you don't need to go.  Slowly increase the time between trips to the bathroom.  When you have found a schedule that works well for you, keep doing it.  If you wake up during the night and have to urinate, do it. Apply your schedule to waking hours only.  Kegel exercises  These tighten and strengthen pelvic muscles, which can help you control the flow of urine. (If doing these exercises causes pain, stop doing them and talk with your doctor.) To do Kegel exercises:  Squeeze your muscles as if you were trying not to pass gas. Or squeeze your muscles as if you were stopping the flow of urine. Your belly, legs, and buttocks shouldn't move.  Hold the squeeze for 3 seconds, then relax for 5 to 10 seconds.  Start with 3 seconds, then add 1 second each week until you are able to squeeze for 10 seconds.  Repeat the exercise 10 times a session. Do 3 to 8 sessions a day.  When should you call for help?  Watch closely for changes in your health, and be sure to contact your doctor if:    Your incontinence is getting worse.     You do not get better as expected.   Where can you learn more?  Go to https://www.[employer].net/patiented  Enter V684 in the search box to learn more about \"Bladder Training: Care Instructions.\"  Current as of: March 1, 2023               Content Version: 13.7    0900-0798 [employer].   Care instructions adapted under license by your healthcare professional. If you have questions about a medical condition or this instruction, always ask your healthcare professional. " [employer] disclaims any warranty or liability for your use of this information.

## 2023-07-25 NOTE — PROGRESS NOTES
"SUBJECTIVE:   Madiha is a 83 year old who presents for Preventive Visit.    Are you in the first 12 months of your Medicare coverage?  No    Healthy Habits:     In general, how would you rate your overall health?  Good    Frequency of exercise:  4-5 days/week    Duration of exercise:  30-45 minutes    Do you usually eat at least 4 servings of fruit and vegetables a day, include whole grains    & fiber and avoid regularly eating high fat or \"junk\" foods?  Yes    Taking medications regularly:  Yes    Ability to successfully perform activities of daily living:  No assistance needed    Home Safety:  No safety concerns identified    Hearing Impairment:  No hearing concerns    In the past 6 months, have you been bothered by leaking of urine? Yes    In general, how would you rate your overall mental or emotional health?  Good    Additional concerns today:  Yes        Have you ever done Advance Care Planning? (For example, a Health Directive, POLST, or a discussion with a medical provider or your loved ones about your wishes): Yes, advance care planning is on file.       Fall risk  Fallen 2 or more times in the past year?: No  Any fall with injury in the past year?: No    Cognitive Screening   1) Repeat 3 items (Leader, Season, Table)    2) Clock draw: NORMAL  3) 3 item recall: Recalls 2 objects   Results: NORMAL clock, 1-2 items recalled: COGNITIVE IMPAIRMENT LESS LIKELY    Mini-CogTM Copyright JUANI Wilcox. Licensed by the author for use in [facility]; reprinted with permission (brian@.[facility]). All rights reserved.      Do you have sleep apnea, excessive snoring or daytime drowsiness? : no    Reviewed and updated as needed this visit by clinical staff   Tobacco  Allergies  Meds  Problems  Med Hx  Surg Hx  Fam Hx          Reviewed and updated as needed this visit by Provider   Tobacco  Allergies  Meds  Problems  Med Hx  Surg Hx  Fam Hx         Social History     Tobacco Use    Smoking status: Never "    Smokeless tobacco: Never   Substance Use Topics    Alcohol use: No             7/18/2023    11:11 AM   Alcohol Use   Prescreen: >3 drinks/day or >7 drinks/week? No     Do you have a current opioid prescription? No  Do you use any other controlled substances or medications that are not prescribed by a provider? None      She comes in today for annual Medicare wellness.  She does need refills of all medications.  She does report she is not using the Lidex cream often but has a couple new actinic keratoses so she likely will restart this.  She is tolerating her cholesterol medication well.  Arthritis has been stable with Celebrex.  She does report mood stable with her fluoxetine.  Blood pressure has been stable with hydrochlorothiazide and metoprolol.  Last year her TSH was out of the normal limits, adjustments in medication were made with recommendations to repeat TSH, this was not completed.  Continues with urinary continence, oxybutynin has been helpful in managing overactive bladder.  Continues with chronic reflux, uses omeprazole and sulcal fate which is managing symptoms.  She continues with chronic cough which she relates to the reflux symptoms.  She does have a known hiatal hernia that is also causing her symptoms.      Current providers sharing in care for this patient include:   Patient Care Team:  Siddharth Kim MD as PCP - General (Internal Medicine)  Siddharth Kim MD as Assigned PCP    The following health maintenance items are reviewed in Epic and correct as of today:  Health Maintenance   Topic Date Due    HEPATITIS A IMMUNIZATION (1 of 2 - Risk 2-dose series) Never done    HEPATITIS B IMMUNIZATION (1 of 3 - Risk 3-dose series) Never done    COVID-19 Vaccine (6 - Pfizer series) 03/02/2023    MEDICARE ANNUAL WELLNESS VISIT  07/07/2023    INFLUENZA VACCINE (1) 09/01/2023    PHQ-9  01/25/2024    TSH W/FREE T4 REFLEX  07/25/2024    FALL RISK ASSESSMENT  07/25/2024    ADVANCE CARE PLANNING   07/25/2028    DTAP/TDAP/TD IMMUNIZATION (4 - Td or Tdap) 07/15/2032    DEXA  09/01/2032    DEPRESSION ACTION PLAN  Completed    Pneumococcal Vaccine: 65+ Years  Completed    ZOSTER IMMUNIZATION  Completed    IPV IMMUNIZATION  Aged Out    MENINGITIS IMMUNIZATION  Aged Out     BP Readings from Last 3 Encounters:   07/25/23 126/74   07/07/22 122/78   05/11/22 110/62    Wt Readings from Last 3 Encounters:   07/25/23 78.5 kg (173 lb)   07/07/22 76.9 kg (169 lb 8 oz)   05/11/22 79.2 kg (174 lb 9.6 oz)                  Patient Active Problem List   Diagnosis    ACP (advance care planning)    Enthesopathy of hip region    Malignant neoplasm of corpus uteri, except isthmus (H)    Cataract    Dysphagia    Chronic hepatitis, unspecified (H)    Hiatal hernia    Tear of lateral cartilage or meniscus of knee, current    Mixed hyperlipidemia    Osteopenia    Popliteal cyst, right    Prediabetes    Primary osteoarthritis involving multiple joints    Recurrent major depressive disorder, in full remission (H)    Scoliosis    Seborrheic keratoses    Urge incontinence    Venous stasis of both lower extremities    Venous stasis of lower extremity    Gastroesophageal reflux disease, esophagitis presence not specified    Essential hypertension    Non morbid obesity due to excess calories    Gastritis, bile acid reflux    Esophageal dysmotility    Inflamed seborrheic keratosis    Tingling of upper extremity     Past Surgical History:   Procedure Laterality Date    ARTHROPLASTY KNEE      6/2011,right knee arthroplasty    ARTHROPLASTY KNEE      07/2011, left knee arthroplasty    ARTHROSCOPY KNEE      Right knee surgery removal of myxoma    ARTHROSCOPY KNEE      1/16/01,Arthroscopy left knee and right knee.    CHOLECYSTECTOMY      No Comments Provided    COLONOSCOPY      1997,Colonoscopy - Next one due 01/07    COLONOSCOPY      7/31/06,Colonoscopy normal, colonoscopy due in 2016    COLONOSCOPY N/A     8/4/2016,wnl no need for fu     ESOPHAGOSCOPY, GASTROSCOPY, DUODENOSCOPY (EGD), COMBINED      No Comments Provided    ESOPHAGOSCOPY, GASTROSCOPY, DUODENOSCOPY (EGD), COMBINED      2017    FINGER SURGERY      ,Reconstruct left first metacarpocarpal joint    HYSTERECTOMY TOTAL ABDOMINAL, BILATERAL SALPINGO-OOPHORECTOMY, COMBINED      97,ILIANA/BSO for endometrial carcinoma    HYSTERECTOMY TOTAL ABDOMINAL, BILATERAL SALPINGO-OOPHORECTOMY, COMBINED          LAPAROSCOPIC TUBAL LIGATION      No Comments Provided    LAPAROSCOPY DIAGNOSTIC (GENERAL)      No Comments Provided    OTHER SURGICAL HISTORY      ,,HERNIA REPAIR,Periumbilical incisional hernia, repaired    OTHER SURGICAL HISTORY      ,,HM DEXA SCAN,DXA normal    OTHER SURGICAL HISTORY      2014,53595.0,OH COLONOSCOPY BIOPSY SINGLE OR MULTIPLE    THYROIDECTOMY      2004,Bilateral thyroidectomy for Hashimoto's thyroiditis       Social History     Tobacco Use    Smoking status: Never    Smokeless tobacco: Never   Substance Use Topics    Alcohol use: No     Family History   Problem Relation Age of Onset    Hypertension Mother         Hypertension    Heart Disease Mother         Heart Disease,CHF    Hypertension Father         Hypertension    Heart Disease Father         Heart Disease,CHF    Other - See Comments Maternal Grandmother         Stroke    Hypertension Maternal Grandmother         Hypertension    Other - See Comments Maternal Grandfather         Stroke    Family History Negative Maternal Grandfather         Good Health,Emphysema    Breast Cancer Other 37        Cancer-breast,fast growing    Diabetes Brother         Diabetes    Family History Negative Sister         Good Health    Arthritis Brother         Arthritis,psoriatic arthritis    Other - See Comments Brother          from burst blood vessel, pneumonia on Enbrel    Breast Cancer Other         Cancer-breast,couple of aunts, youngest at mid 40s    Asthma No family hx of         Asthma     Rhinitis No family hx of         Allergic rhinitis         Current Outpatient Medications   Medication Sig Dispense Refill    atorvastatin (LIPITOR) 40 MG tablet Take 1 tablet (40 mg) by mouth daily 90 tablet 3    celecoxib (CELEBREX) 100 MG capsule Take 1 capsule (100 mg) by mouth 2 times daily 180 capsule 3    clindamycin (CLEOCIN) 300 MG capsule TK 2 CS PO 1 HOUR PRIOR TO PROCEDURE      FLUoxetine (PROZAC) 20 MG capsule TAKE 1 CAPSULE EVERY MORNING 90 capsule 3    hydrochlorothiazide (HYDRODIURIL) 25 MG tablet Take 1 tablet (25 mg) by mouth daily 90 tablet 3    levothyroxine (SYNTHROID/LEVOTHROID) 125 MCG tablet Take 1 tablet (125 mcg) by mouth daily 90 tablet 3    metoprolol tartrate (LOPRESSOR) 100 MG tablet Take 1 tablet (100 mg) by mouth 2 times daily 180 tablet 3    Multiple Vitamin (MULTI-VITAMINS) TABS       omeprazole (PRILOSEC) 20 MG DR capsule TAKE 1 CAPSULE EVERY DAY 90 capsule 3    oxybutynin (DITROPAN) 5 MG tablet Take 1 tablet (5 mg) by mouth daily 90 tablet 3    potassium chloride ER (KLOR-CON) 10 MEQ CR tablet Take 2 tablets (20 mEq) by mouth daily 180 tablet 3    sucralfate (CARAFATE) 1 GM tablet Take 1 tablet (1 g) by mouth 3 times daily (before meals) 270 tablet 3    Ubiquinol 100 MG CAPS       fluocinonide (LIDEX) 0.05 % external cream Use daily as needed for rash. 30 g 11     Allergies   Allergen Reactions    Bicillin C-R, Nausea     Became faint    Diatrizoate Hives    Sulfa Antibiotics Rash     Mammogram Screening: Mammogram Screening - Patient over age 75, has elected to continue with screening.      Pertinent mammograms are reviewed under the imaging tab.    Review of Systems   Constitutional:  Negative for chills and fever.   HENT:  Negative for congestion, ear pain, hearing loss and sore throat.    Eyes:  Negative for pain and visual disturbance.   Respiratory:  Positive for cough. Negative for shortness of breath.    Cardiovascular:  Negative for chest pain, palpitations and peripheral  "edema.   Gastrointestinal:  Positive for abdominal pain and heartburn. Negative for constipation, diarrhea, hematochezia and nausea.   Breasts:  Positive for tenderness. Negative for discharge.   Genitourinary:  Negative for dysuria, frequency, genital sores, hematuria, pelvic pain, urgency, vaginal bleeding and vaginal discharge.   Musculoskeletal:  Positive for arthralgias. Negative for joint swelling and myalgias.   Skin:  Negative for rash.   Neurological:  Negative for dizziness, weakness, headaches and paresthesias.   Psychiatric/Behavioral:  Negative for mood changes. The patient is not nervous/anxious.        OBJECTIVE:   /74   Pulse 50   Temp 97.8  F (36.6  C)   Resp 16   Ht 1.575 m (5' 2\")   Wt 78.5 kg (173 lb)   LMP  (LMP Unknown)   SpO2 96%   BMI 31.64 kg/m   Estimated body mass index is 31.64 kg/m  as calculated from the following:    Height as of this encounter: 1.575 m (5' 2\").    Weight as of this encounter: 78.5 kg (173 lb).  Physical Exam  GENERAL APPEARANCE: healthy, alert and no distress  EYES: Eyes grossly normal to inspection, PERRL and conjunctivae and sclerae normal  HENT: ear canals and TM's normal, nose and mouth without ulcers or lesions, oropharynx clear and oral mucous membranes moist.  Impacted wax bilaterally which was flushed by nursing.  NECK: no adenopathy, no asymmetry, masses, or scars and thyroid normal to palpation  RESP: lungs clear to auscultation - no rales, rhonchi or wheezes  CV: regular rate and rhythm, normal S1 S2, no S3 or S4, no murmur, click or rub, no peripheral edema and peripheral pulses strong  ABDOMEN: soft, nontender, no hepatosplenomegaly, no masses and bowel sounds normal  MS: no musculoskeletal defects are noted and gait is age appropriate without ataxia  SKIN: no suspicious lesions or rashes.  Nose with shiny slightly skin colored lesion that is not consistent with her other actinic keratoses  NEURO: Normal strength and tone, sensory exam " grossly normal, mentation intact and speech normal  PSYCH: mentation appears normal and affect normal/bright    Diagnostic Test Results:  Labs reviewed in Epic  Results for orders placed or performed in visit on 07/25/23 (from the past 24 hour(s))   Comprehensive Metabolic Panel   Result Value Ref Range    Sodium 140 136 - 145 mmol/L    Potassium 3.7 3.4 - 5.3 mmol/L    Chloride 98 98 - 107 mmol/L    Carbon Dioxide (CO2) 31 (H) 22 - 29 mmol/L    Anion Gap 11 7 - 15 mmol/L    Urea Nitrogen 11.1 8.0 - 23.0 mg/dL    Creatinine 0.90 0.51 - 0.95 mg/dL    Calcium 9.2 8.8 - 10.2 mg/dL    Glucose 112 (H) 70 - 99 mg/dL    Alkaline Phosphatase 190 (H) 35 - 104 U/L    AST 49 (H) 0 - 45 U/L    ALT 81 (H) 0 - 50 U/L    Protein Total 6.8 6.4 - 8.3 g/dL    Albumin 3.9 3.5 - 5.2 g/dL    Bilirubin Total 0.9 <=1.2 mg/dL    GFR Estimate 64 >60 mL/min/1.73m2   Lipid Panel   Result Value Ref Range    Cholesterol 174 <200 mg/dL    Triglycerides 195 (H) <150 mg/dL    Direct Measure HDL 56 >=50 mg/dL    LDL Cholesterol Calculated 79 <=100 mg/dL    Non HDL Cholesterol 118 <130 mg/dL    Narrative    Cholesterol  Desirable:  <200 mg/dL    Triglycerides  Normal:  Less than 150 mg/dL  Borderline High:  150-199 mg/dL  High:  200-499 mg/dL  Very High:  Greater than or equal to 500 mg/dL    Direct Measure HDL  Female:  Greater than or equal to 50 mg/dL   Male:  Greater than or equal to 40 mg/dL    LDL Cholesterol  Desirable:  <100mg/dL  Above Desirable:  100-129 mg/dL   Borderline High:  130-159 mg/dL   High:  160-189 mg/dL   Very High:  >= 190 mg/dL    Non HDL Cholesterol  Desirable:  130 mg/dL  Above Desirable:  130-159 mg/dL  Borderline High:  160-189 mg/dL  High:  190-219 mg/dL  Very High:  Greater than or equal to 220 mg/dL   TSH Reflex GH   Result Value Ref Range    TSH 7.16 (H) 0.30 - 4.20 uIU/mL   T4 free   Result Value Ref Range    Free T4 1.32 0.90 - 1.70 ng/dL       ASSESSMENT / PLAN:       ICD-10-CM    1. Encounter for Medicare annual  wellness exam  Z00.00       2. Mixed hyperlipidemia  E78.2 atorvastatin (LIPITOR) 40 MG tablet     Lipid Panel     Lipid Panel      3. Essential hypertension  I10 metoprolol tartrate (LOPRESSOR) 100 MG tablet     hydrochlorothiazide (HYDRODIURIL) 25 MG tablet     Comprehensive Metabolic Panel     Comprehensive Metabolic Panel      4. Drug-induced hypokalemia  E87.6 potassium chloride ER (KLOR-CON) 10 MEQ CR tablet    T50.905A       5. Urge incontinence  N39.41 oxybutynin (DITROPAN) 5 MG tablet      6. Skin lesion  L98.9 Adult Dermatology Referral      7. Primary osteoarthritis involving multiple joints  M15.9 celecoxib (CELEBREX) 100 MG capsule      8. Mood disorder (H)  F39 FLUoxetine (PROZAC) 20 MG capsule      9. Hiatal hernia  K44.9 sucralfate (CARAFATE) 1 GM tablet      10. Seborrheic keratoses  L82.1 Adult Dermatology Referral      11. Gastroesophageal reflux disease, unspecified whether esophagitis present  K21.9       12. Hypothyroidism, unspecified type  E03.9 TSH Reflex GH     TSH Reflex GH     T4 free     levothyroxine (SYNTHROID/LEVOTHROID) 125 MCG tablet     TSH Reflex GH      13. Malignant neoplasm of corpus uteri, except isthmus (H)  C54.9       14. Chronic hepatitis, unspecified (H)  K73.9       15. Excessive cerumen in both ear canals  H61.23         TSH is elevated, adjustments to levothyroxine was completed with recommendations to repeat this again in 2 months.  She was notified via [employer].  Refilled all medications above, conditions are stable.  Liver functions have improved in relation to her chronic hepatitis.  Ear flush as noted above, tolerated well.  Potassium is stable, continue with supplementation.  Skin lesion on nose appears slightly different than her actinic keratoses, no concerns for basal or squamous cell carcinoma.  We did discuss evaluation, dermatology was placed.  Patient has been advised of split billing requirements and indicates understanding: Yes      COUNSELING:  Reviewed  "preventive health counseling, as reflected in patient instructions       Regular exercise       Healthy diet/nutrition       Dental care       Bladder control       Fall risk prevention      BMI:   Estimated body mass index is 31.64 kg/m  as calculated from the following:    Height as of this encounter: 1.575 m (5' 2\").    Weight as of this encounter: 78.5 kg (173 lb).   Weight management plan: Discussed healthy diet and exercise guidelines      She reports that she has never smoked. She has never used smokeless tobacco.      Appropriate preventive services were discussed with this patient, including applicable screening as appropriate for cardiovascular disease, diabetes, osteopenia/osteoporosis, and glaucoma.  As appropriate for age/gender, discussed screening for colorectal cancer, prostate cancer, breast cancer, and cervical cancer. Checklist reviewing preventive services available has been given to the patient.    Reviewed patients plan of care and provided an AVS. The Basic Care Plan (routine screening as documented in Health Maintenance) for Madiha meets the Care Plan requirement. This Care Plan has been established and reviewed with the Patient.          SHAUN [facility]    Identified Health Risks:  I have reviewed Opioid Use Disorder and Substance Use Disorder risk factors and made any needed referrals. Information on urinary incontinence and treatment options given to patient.  "

## 2023-08-31 ENCOUNTER — TRANSFERRED RECORDS (OUTPATIENT)
Dept: HEALTH INFORMATION MANAGEMENT | Facility: OTHER | Age: 83
End: 2023-08-31
Payer: COMMERCIAL

## 2023-10-06 ENCOUNTER — ALLIED HEALTH/NURSE VISIT (OUTPATIENT)
Dept: FAMILY MEDICINE | Facility: OTHER | Age: 83
End: 2023-10-06
Payer: MEDICARE

## 2023-10-06 ENCOUNTER — MYC MEDICAL ADVICE (OUTPATIENT)
Dept: FAMILY MEDICINE | Facility: OTHER | Age: 83
End: 2023-10-06

## 2023-10-06 ENCOUNTER — LAB (OUTPATIENT)
Dept: LAB | Facility: OTHER | Age: 83
End: 2023-10-06
Payer: MEDICARE

## 2023-10-06 DIAGNOSIS — Z23 NEED FOR PROPHYLACTIC VACCINATION AND INOCULATION AGAINST INFLUENZA: Primary | ICD-10-CM

## 2023-10-06 DIAGNOSIS — E03.9 HYPOTHYROIDISM, UNSPECIFIED TYPE: ICD-10-CM

## 2023-10-06 DIAGNOSIS — Z23 HIGH PRIORITY FOR 2019-NCOV VACCINE: ICD-10-CM

## 2023-10-06 LAB — TSH SERPL DL<=0.005 MIU/L-ACNC: 1.35 UIU/ML (ref 0.3–4.2)

## 2023-10-06 PROCEDURE — 91320 SARSCV2 VAC 30MCG TRS-SUC IM: CPT

## 2023-10-06 PROCEDURE — 90662 IIV NO PRSV INCREASED AG IM: CPT

## 2023-10-06 PROCEDURE — 84443 ASSAY THYROID STIM HORMONE: CPT | Mod: ZL

## 2023-10-06 PROCEDURE — 36415 COLL VENOUS BLD VENIPUNCTURE: CPT | Mod: ZL

## 2023-11-13 ENCOUNTER — OFFICE VISIT (OUTPATIENT)
Dept: PEDIATRICS | Facility: OTHER | Age: 83
End: 2023-11-13
Attending: INTERNAL MEDICINE
Payer: COMMERCIAL

## 2023-11-13 VITALS
BODY MASS INDEX: 30.41 KG/M2 | WEIGHT: 171.6 LBS | SYSTOLIC BLOOD PRESSURE: 128 MMHG | OXYGEN SATURATION: 94 % | DIASTOLIC BLOOD PRESSURE: 82 MMHG | RESPIRATION RATE: 16 BRPM | HEART RATE: 54 BPM | HEIGHT: 63 IN | TEMPERATURE: 98 F

## 2023-11-13 DIAGNOSIS — R74.8 ELEVATED LIVER ENZYMES: ICD-10-CM

## 2023-11-13 DIAGNOSIS — N39.9 URINARY PROBLEM IN FEMALE: ICD-10-CM

## 2023-11-13 DIAGNOSIS — R82.998 DARK URINE: Primary | ICD-10-CM

## 2023-11-13 DIAGNOSIS — R10.84 ABDOMINAL PAIN, GENERALIZED: ICD-10-CM

## 2023-11-13 DIAGNOSIS — K83.8 DILATED BILE DUCT: ICD-10-CM

## 2023-11-13 LAB
ALBUMIN SERPL BCG-MCNC: 3.9 G/DL (ref 3.5–5.2)
ALBUMIN UR-MCNC: NEGATIVE MG/DL
ALP SERPL-CCNC: 270 U/L (ref 35–104)
ALT SERPL W P-5'-P-CCNC: 227 U/L (ref 0–50)
AMYLASE SERPL-CCNC: 30 U/L (ref 28–100)
ANION GAP SERPL CALCULATED.3IONS-SCNC: 12 MMOL/L (ref 7–15)
APPEARANCE UR: CLEAR
AST SERPL W P-5'-P-CCNC: 134 U/L (ref 0–45)
BILIRUB SERPL-MCNC: 1.7 MG/DL
BILIRUB UR QL STRIP: NEGATIVE
BUN SERPL-MCNC: 13.3 MG/DL (ref 8–23)
CALCIUM SERPL-MCNC: 9.2 MG/DL (ref 8.8–10.2)
CHLORIDE SERPL-SCNC: 97 MMOL/L (ref 98–107)
COLOR UR AUTO: YELLOW
CREAT SERPL-MCNC: 0.99 MG/DL (ref 0.51–0.95)
DEPRECATED HCO3 PLAS-SCNC: 30 MMOL/L (ref 22–29)
EGFRCR SERPLBLD CKD-EPI 2021: 57 ML/MIN/1.73M2
ERYTHROCYTE [DISTWIDTH] IN BLOOD BY AUTOMATED COUNT: 12.4 % (ref 10–15)
GLUCOSE SERPL-MCNC: 108 MG/DL (ref 70–99)
GLUCOSE UR STRIP-MCNC: NEGATIVE MG/DL
HCT VFR BLD AUTO: 44.7 % (ref 35–47)
HGB BLD-MCNC: 15.3 G/DL (ref 11.7–15.7)
HGB UR QL STRIP: NEGATIVE
HOLD SPECIMEN: NORMAL
INR PPP: 0.99 (ref 0.85–1.15)
KETONES UR STRIP-MCNC: NEGATIVE MG/DL
LEUKOCYTE ESTERASE UR QL STRIP: ABNORMAL
LIPASE SERPL-CCNC: 40 U/L (ref 13–60)
MCH RBC QN AUTO: 31.9 PG (ref 26.5–33)
MCHC RBC AUTO-ENTMCNC: 34.2 G/DL (ref 31.5–36.5)
MCV RBC AUTO: 93 FL (ref 78–100)
MUCOUS THREADS #/AREA URNS LPF: PRESENT /LPF
NITRATE UR QL: NEGATIVE
PH UR STRIP: 5.5 [PH] (ref 5–9)
PLATELET # BLD AUTO: 223 10E3/UL (ref 150–450)
POTASSIUM SERPL-SCNC: 3.3 MMOL/L (ref 3.4–5.3)
PREALB SERPL-MCNC: 19.7 MG/DL (ref 20–40)
PROT SERPL-MCNC: 7.2 G/DL (ref 6.4–8.3)
RBC # BLD AUTO: 4.79 10E6/UL (ref 3.8–5.2)
RBC URINE: 2 /HPF
SODIUM SERPL-SCNC: 139 MMOL/L (ref 135–145)
SP GR UR STRIP: 1.01 (ref 1–1.03)
SQUAMOUS EPITHELIAL: 1 /HPF
UROBILINOGEN UR STRIP-MCNC: NORMAL MG/DL
WBC # BLD AUTO: 6.3 10E3/UL (ref 4–11)
WBC URINE: 2 /HPF

## 2023-11-13 PROCEDURE — 36415 COLL VENOUS BLD VENIPUNCTURE: CPT | Mod: ZL | Performed by: INTERNAL MEDICINE

## 2023-11-13 PROCEDURE — 83690 ASSAY OF LIPASE: CPT | Mod: ZL | Performed by: INTERNAL MEDICINE

## 2023-11-13 PROCEDURE — 81001 URINALYSIS AUTO W/SCOPE: CPT | Mod: ZL | Performed by: INTERNAL MEDICINE

## 2023-11-13 PROCEDURE — 85610 PROTHROMBIN TIME: CPT | Mod: ZL | Performed by: INTERNAL MEDICINE

## 2023-11-13 PROCEDURE — 82977 ASSAY OF GGT: CPT | Mod: ZL | Performed by: INTERNAL MEDICINE

## 2023-11-13 PROCEDURE — 80053 COMPREHEN METABOLIC PANEL: CPT | Mod: ZL | Performed by: INTERNAL MEDICINE

## 2023-11-13 PROCEDURE — 99214 OFFICE O/P EST MOD 30 MIN: CPT | Performed by: INTERNAL MEDICINE

## 2023-11-13 PROCEDURE — 84134 ASSAY OF PREALBUMIN: CPT | Mod: ZL | Performed by: INTERNAL MEDICINE

## 2023-11-13 PROCEDURE — 82150 ASSAY OF AMYLASE: CPT | Mod: ZL | Performed by: INTERNAL MEDICINE

## 2023-11-13 PROCEDURE — G0463 HOSPITAL OUTPT CLINIC VISIT: HCPCS | Performed by: INTERNAL MEDICINE

## 2023-11-13 PROCEDURE — 85014 HEMATOCRIT: CPT | Mod: ZL | Performed by: INTERNAL MEDICINE

## 2023-11-13 ASSESSMENT — PAIN SCALES - GENERAL: PAINLEVEL: NO PAIN (0)

## 2023-11-13 NOTE — PROGRESS NOTES
Assessment & Plan       ICD-10-CM    1. Dark urine  R82.998 CBC with platelets     Comprehensive metabolic panel     Lipase     Amylase     Prealbumin     INR     GAMMA GT (GGT)     CBC with platelets     Comprehensive metabolic panel     Lipase     Amylase     Prealbumin     INR     GAMMA GT (GGT)      2. Urinary problem in female  N39.9 UA Macroscopic with reflex to Microscopic and Culture      3. Abdominal pain, generalized  R10.84 CBC with platelets     Comprehensive metabolic panel     Lipase     Amylase     Prealbumin     INR     GAMMA GT (GGT)     CBC with platelets     Comprehensive metabolic panel     Lipase     Amylase     Prealbumin     INR     GAMMA GT (GGT)      4. Elevated liver enzymes  R74.8 CBC with platelets     Comprehensive metabolic panel     Lipase     Amylase     Prealbumin     INR     GAMMA GT (GGT)     CBC with platelets     Comprehensive metabolic panel     Lipase     Amylase     Prealbumin     INR     GAMMA GT (GGT)      5. Dilated bile duct  K83.8 CBC with platelets     Comprehensive metabolic panel     Lipase     Amylase     Prealbumin     INR     GAMMA GT (GGT)     CBC with platelets     Comprehensive metabolic panel     Lipase     Amylase     Prealbumin     INR     GAMMA GT (GGT)        This does not appear consistent with a UTI.  I think the dark color she noted was likely bilirubin.  She has had episodes of jaundiced and liver inflammation attributed to the biliary tree with a history of a dilated bile duct culminating at narrowing at the ampulla.  She has had a cholecystectomy so scar tissue at the site is possible.  Previous MRCP revealed no evidence for malignancy.  Symptoms are resolving and so Gilbert syndrome also a possibility.  We will have a low threshold for expert consultation.    Patient Instructions    -- Lab today   -- Consider MRCP   -- Consider GI consult for EUS/ERCP if ongoing concerns        Return if symptoms worsen or fail to improve.    Signed, Siddharth Kim MD,  "FAAP, FACP  Internal Medicine & Pediatrics    Subjective   Madiha Bletran is a 83 year old female who presents for dark urine, back pain.  Last week she felt under the weather.  By Saturday she noticed that her urine was orange.  No burning.  No constipation.  No fever.  She did have a little tenderness throughout the lower abdomen that felt like a bloating sensation.    Objective   Vitals: /82   Pulse 54   Temp 98  F (36.7  C) (Tympanic)   Resp 16   Ht 1.6 m (5' 3\")   Wt 77.8 kg (171 lb 9.6 oz)   LMP 01/21/1997 (Approximate)   SpO2 94%   Breastfeeding No   BMI 30.40 kg/m      Abdomen: Soft, nontender, nondistended.  No hepatosplenomegaly.  No masses.    Review and Analysis of Data   I personally reviewed the following:  External notes: Yes [facility] gastroenterology note reviewed  Results: Yes local laboratory and imaging data reports reviewed.  Use of an independent historian: No  Independent review of a test performed by another physician: No  Discussion of management with another physician: No  Moderate risk of morbidity from additional diagnostic testing and/or treatment.    "

## 2023-11-13 NOTE — NURSING NOTE
"Chief Complaint   Patient presents with    Dysuria     Dark urine, low back pain       Initial /82   Pulse 54   Temp 98  F (36.7  C) (Tympanic)   Resp 16   Ht 1.6 m (5' 3\")   Wt 77.8 kg (171 lb 9.6 oz)   LMP 01/21/1997 (Approximate)   SpO2 94%   Breastfeeding No   BMI 30.40 kg/m   Estimated body mass index is 30.4 kg/m  as calculated from the following:    Height as of this encounter: 1.6 m (5' 3\").    Weight as of this encounter: 77.8 kg (171 lb 9.6 oz).  Medication Review: complete    The next two questions are to help us understand your food security.  If you are feeling you need any assistance in this area, we have resources available to support you today.           No data to display                  Health Care Directive:  Patient does not have a Health Care Directive or Living Will: brought copy in after July 2023 visit to be scanned but is not in chart.    Norma J. Gosselin, LPN      "

## 2023-11-14 LAB — GGT SERPL-CCNC: 437 U/L (ref 5–36)

## 2023-11-15 ENCOUNTER — MYC MEDICAL ADVICE (OUTPATIENT)
Dept: PEDIATRICS | Facility: OTHER | Age: 83
End: 2023-11-15
Payer: COMMERCIAL

## 2023-11-20 ENCOUNTER — TELEPHONE (OUTPATIENT)
Dept: PEDIATRICS | Facility: OTHER | Age: 83
End: 2023-11-20
Payer: COMMERCIAL

## 2023-11-20 DIAGNOSIS — K83.8 DILATED BILE DUCT: ICD-10-CM

## 2023-11-20 DIAGNOSIS — R74.8 ELEVATED LIVER ENZYMES: Primary | ICD-10-CM

## 2023-11-20 NOTE — TELEPHONE ENCOUNTER
There is likely some issue blocking drainage at bile duct/intestine interface.  Possibly scarring from cholecystectomy.      ICD-10-CM    1. Elevated liver enzymes  R74.8 MR Abdomen MRCP w/o & w Contrast     Adult GI  Referral - Consult Only      2. Dilated bile duct  K83.8 MR Abdomen MRCP w/o & w Contrast     Adult GI  Referral - Consult Only         -- MRCP   -- GI consult     Signed, Siddharth Kim MD, FAAP, FACP  Internal Medicine & Pediatrics

## 2023-11-20 NOTE — TELEPHONE ENCOUNTER
Reason for call: Request for a referral.    Referral requested for what concern?  Orange Urine- other    Have you already been seen by the specialty you need the referral for?  yes    If yes, Date:   000,  Location:   [facility],  Provider:   BIJU    If no,  Where do you want to go?   [facility]    Additional comments:   Saw you and discussed an MRI- still having trouble would like you to put order in for one    Preferred method for responding to this message: Telephone Call    Phone number patient can be reached at? Home number on file [phone] (home)    If we can't reach you directly, may we leave a detailed response at the number you provided? Yes    No call back unless any questions

## 2023-11-20 NOTE — TELEPHONE ENCOUNTER
Yesterday her urine was dark, today is good. Please place MRI and what will the mri cover? Did you want to see her in the office?  Norma J. Gosselin, LPN .......  11/20/2023  11:36 AM

## 2023-11-20 NOTE — TELEPHONE ENCOUNTER
After verifying patient's name and date of birth, patient was given the below information.  Norma J. Gosselin, LPN....11/20/2023 11:56 AM

## 2023-11-22 ENCOUNTER — HOSPITAL ENCOUNTER (OUTPATIENT)
Dept: MAMMOGRAPHY | Facility: OTHER | Age: 83
Discharge: HOME | End: 2023-11-22
Attending: INTERNAL MEDICINE | Admitting: INTERNAL MEDICINE
Payer: MEDICARE

## 2023-11-22 DIAGNOSIS — Z12.31 VISIT FOR SCREENING MAMMOGRAM: ICD-10-CM

## 2023-11-22 PROCEDURE — 77067 SCR MAMMO BI INCL CAD: CPT

## 2023-12-05 ENCOUNTER — HOSPITAL ENCOUNTER (OUTPATIENT)
Dept: MRI IMAGING | Facility: OTHER | Age: 83
Discharge: HOME | End: 2023-12-05
Attending: INTERNAL MEDICINE | Admitting: INTERNAL MEDICINE
Payer: MEDICARE

## 2023-12-05 DIAGNOSIS — R74.8 ELEVATED LIVER ENZYMES: ICD-10-CM

## 2023-12-05 DIAGNOSIS — K83.8 DILATED BILE DUCT: ICD-10-CM

## 2023-12-05 PROCEDURE — A9575 INJ GADOTERATE MEGLUMI 0.1ML: HCPCS | Mod: JZ | Performed by: INTERNAL MEDICINE

## 2023-12-05 PROCEDURE — G1010 CDSM STANSON: HCPCS

## 2023-12-05 PROCEDURE — 255N000002 HC RX 255 OP 636: Mod: JZ | Performed by: INTERNAL MEDICINE

## 2023-12-05 PROCEDURE — 74183 MRI ABD W/O CNTR FLWD CNTR: CPT | Mod: MG

## 2023-12-05 RX ADMIN — GADOTERATE MEGLUMINE 16 ML: 376.9 INJECTION INTRAVENOUS at 10:34

## 2023-12-18 ENCOUNTER — OFFICE VISIT (OUTPATIENT)
Dept: PEDIATRICS | Facility: OTHER | Age: 83
End: 2023-12-18
Attending: INTERNAL MEDICINE
Payer: COMMERCIAL

## 2023-12-18 VITALS
RESPIRATION RATE: 16 BRPM | DIASTOLIC BLOOD PRESSURE: 74 MMHG | TEMPERATURE: 98 F | BODY MASS INDEX: 30.12 KG/M2 | WEIGHT: 170 LBS | SYSTOLIC BLOOD PRESSURE: 134 MMHG | OXYGEN SATURATION: 95 % | HEART RATE: 56 BPM | HEIGHT: 63 IN

## 2023-12-18 DIAGNOSIS — K83.1 CHOLESTASIS (H): ICD-10-CM

## 2023-12-18 DIAGNOSIS — R74.8 ELEVATED ALKALINE PHOSPHATASE LEVEL: ICD-10-CM

## 2023-12-18 DIAGNOSIS — K83.8 DILATION OF COMMON BILE DUCT: Primary | ICD-10-CM

## 2023-12-18 DIAGNOSIS — R74.01 ELEVATED LIVER TRANSAMINASE LEVEL: ICD-10-CM

## 2023-12-18 LAB
ALBUMIN SERPL BCG-MCNC: 3.7 G/DL (ref 3.5–5.2)
ALP SERPL-CCNC: 579 U/L (ref 40–150)
ALT SERPL W P-5'-P-CCNC: 153 U/L (ref 0–50)
AMYLASE SERPL-CCNC: 31 U/L (ref 28–100)
ANION GAP SERPL CALCULATED.3IONS-SCNC: 10 MMOL/L (ref 7–15)
AST SERPL W P-5'-P-CCNC: 149 U/L (ref 0–45)
BASOPHILS # BLD AUTO: 0 10E3/UL (ref 0–0.2)
BASOPHILS NFR BLD AUTO: 1 %
BILIRUB SERPL-MCNC: 2.7 MG/DL
BUN SERPL-MCNC: 12.4 MG/DL (ref 8–23)
CALCIUM SERPL-MCNC: 9.4 MG/DL (ref 8.8–10.2)
CHLORIDE SERPL-SCNC: 98 MMOL/L (ref 98–107)
CREAT SERPL-MCNC: 0.89 MG/DL (ref 0.51–0.95)
DEPRECATED HCO3 PLAS-SCNC: 31 MMOL/L (ref 22–29)
EGFRCR SERPLBLD CKD-EPI 2021: 65 ML/MIN/1.73M2
EOSINOPHIL # BLD AUTO: 0.2 10E3/UL (ref 0–0.7)
EOSINOPHIL NFR BLD AUTO: 2 %
ERYTHROCYTE [DISTWIDTH] IN BLOOD BY AUTOMATED COUNT: 12.7 % (ref 10–15)
GLUCOSE SERPL-MCNC: 126 MG/DL (ref 70–99)
HCT VFR BLD AUTO: 44.4 % (ref 35–47)
HGB BLD-MCNC: 15.1 G/DL (ref 11.7–15.7)
IMM GRANULOCYTES # BLD: 0.1 10E3/UL
IMM GRANULOCYTES NFR BLD: 1 %
LIPASE SERPL-CCNC: 46 U/L (ref 13–60)
LYMPHOCYTES # BLD AUTO: 1.9 10E3/UL (ref 0.8–5.3)
LYMPHOCYTES NFR BLD AUTO: 22 %
MCH RBC QN AUTO: 31.9 PG (ref 26.5–33)
MCHC RBC AUTO-ENTMCNC: 34 G/DL (ref 31.5–36.5)
MCV RBC AUTO: 94 FL (ref 78–100)
MONOCYTES # BLD AUTO: 0.8 10E3/UL (ref 0–1.3)
MONOCYTES NFR BLD AUTO: 10 %
NEUTROPHILS # BLD AUTO: 5.4 10E3/UL (ref 1.6–8.3)
NEUTROPHILS NFR BLD AUTO: 64 %
NRBC # BLD AUTO: 0 10E3/UL
NRBC BLD AUTO-RTO: 0 /100
PLATELET # BLD AUTO: 246 10E3/UL (ref 150–450)
POTASSIUM SERPL-SCNC: 3.7 MMOL/L (ref 3.4–5.3)
PROT SERPL-MCNC: 7.4 G/DL (ref 6.4–8.3)
RBC # BLD AUTO: 4.74 10E6/UL (ref 3.8–5.2)
SODIUM SERPL-SCNC: 139 MMOL/L (ref 135–145)
WBC # BLD AUTO: 8.3 10E3/UL (ref 4–11)

## 2023-12-18 PROCEDURE — 85025 COMPLETE CBC W/AUTO DIFF WBC: CPT | Mod: ZL | Performed by: INTERNAL MEDICINE

## 2023-12-18 PROCEDURE — 83690 ASSAY OF LIPASE: CPT | Mod: ZL | Performed by: INTERNAL MEDICINE

## 2023-12-18 PROCEDURE — 80053 COMPREHEN METABOLIC PANEL: CPT | Mod: ZL | Performed by: INTERNAL MEDICINE

## 2023-12-18 PROCEDURE — 82150 ASSAY OF AMYLASE: CPT | Mod: ZL | Performed by: INTERNAL MEDICINE

## 2023-12-18 PROCEDURE — 99214 OFFICE O/P EST MOD 30 MIN: CPT | Performed by: INTERNAL MEDICINE

## 2023-12-18 PROCEDURE — 36415 COLL VENOUS BLD VENIPUNCTURE: CPT | Mod: ZL | Performed by: INTERNAL MEDICINE

## 2023-12-18 PROCEDURE — G0463 HOSPITAL OUTPT CLINIC VISIT: HCPCS

## 2023-12-18 ASSESSMENT — PATIENT HEALTH QUESTIONNAIRE - PHQ9
SUM OF ALL RESPONSES TO PHQ QUESTIONS 1-9: 1
SUM OF ALL RESPONSES TO PHQ QUESTIONS 1-9: 1
10. IF YOU CHECKED OFF ANY PROBLEMS, HOW DIFFICULT HAVE THESE PROBLEMS MADE IT FOR YOU TO DO YOUR WORK, TAKE CARE OF THINGS AT HOME, OR GET ALONG WITH OTHER PEOPLE: NOT DIFFICULT AT ALL

## 2023-12-18 ASSESSMENT — PAIN SCALES - GENERAL: PAINLEVEL: MODERATE PAIN (5)

## 2023-12-18 NOTE — NURSING NOTE
"Chief Complaint   Patient presents with    Follow Up     Yellowing of skin, orange color urine, poor appetite, gassy felling in belly    Arm Pain     And between shoulder blades    Cough       Initial /74   Pulse 56   Temp 98  F (36.7  C) (Tympanic)   Resp 16   Ht 1.6 m (5' 3\")   Wt 77.1 kg (170 lb)   LMP 01/21/1997 (Approximate)   SpO2 95%   Breastfeeding No   BMI 30.11 kg/m   Estimated body mass index is 30.11 kg/m  as calculated from the following:    Height as of this encounter: 1.6 m (5' 3\").    Weight as of this encounter: 77.1 kg (170 lb).  Medication Review: complete    The next two questions are to help us understand your food security.  If you are feeling you need any assistance in this area, we have resources available to support you today.          12/18/2023   SDOH- Food Insecurity   Within the past 12 months, did you worry that your food would run out before you got money to buy more? N   Within the past 12 months, did the food you bought just not last and you didn t have money to get more? N         Health Care Directive:  Patient does not have a Health Care Directive or Living Will: Discussed advance care planning with patient; however, patient declined at this time.    Norma J. Gosselin, LPN      "

## 2023-12-18 NOTE — PROGRESS NOTES
Assessment & Plan       ICD-10-CM    1. Dilation of common bile duct  K83.8 CBC with Platelets & Differential     Comprehensive metabolic panel     Lipase     Amylase     Amylase     Lipase     Comprehensive metabolic panel     CBC with Platelets & Differential     Adult GI  Referral - Consult Only      2. Cholestasis (H28)  K83.1 CBC with Platelets & Differential     Comprehensive metabolic panel     Lipase     Amylase     Amylase     Lipase     Comprehensive metabolic panel     CBC with Platelets & Differential     Adult GI  Referral - Consult Only      3. Elevated liver transaminase level  R74.01 Adult GI  Referral - Consult Only      4. Elevated alkaline phosphatase level  R74.8 Adult GI  Referral - Consult Only        I looked back and reviewed her MRCP.  This shows extrahepatic ductal dilatation with some areas of intraductal mass versus stone.  We have discussed the possibility of scar tissue at the site of the previous cholecystectomy as the potential source for obstruction.  This does not appear to be affecting the pancreas as pancreatic enzymes are within normal limits.  Unfortunately I do not think any local resources are going to be helpful in further diagnostic or treatment of this condition.  I continue to recommend a gastroenterology consultation.  I believe she requires ERCP and possibly EUS for further diagnostic and treatment modalities.  An urgent gastroenterology consultation is requested.  Warning signs were discussed and prompt repeat evaluation recommended at that time.  If she were to present to our facility she may need to be transferred to a higher level of care.  If she feels that she could safely transport via private car she could present to the tertiary center emergency department.    Return if symptoms worsen or fail to improve.    Signed, Siddharth Kim MD, FAAP, FACP  Internal Medicine & Pediatrics    Subjective   Madiha Beltran is a 83 year old  "female who presents for , Turning yellow again.  She continues to feel ill.  She is trying to do activities but she still feels sick.  Her appetite is down.  She has been having some epigastric discomfort.  It is little better today.    Objective   Vitals: /74   Pulse 56   Temp 98  F (36.7  C) (Tympanic)   Resp 16   Ht 1.6 m (5' 3\")   Wt 77.1 kg (170 lb)   LMP 01/21/1997 (Approximate)   SpO2 95%   Breastfeeding No   BMI 30.11 kg/m      Cardiovascular: Regular  Pulmonary: Clear  Abdomen: Soft, nondistended.  No right upper quadrant tenderness.  Mild epigastric tenderness.    Review and Analysis of Data   I personally reviewed the following:  External notes: No  Results: Yes serial liver enzymes reviewed.  Lab from today reviewed.  Use of an independent historian: No  Independent review of a test performed by another physician: No  Discussion of management with another physician: No  Moderate risk of morbidity from additional diagnostic testing and/or treatment.    "

## 2023-12-23 ENCOUNTER — HOSPITAL ENCOUNTER (EMERGENCY)
Facility: OTHER | Age: 83
Discharge: ANOTHER HOSPITAL | End: 2023-12-24
Attending: PHYSICIAN ASSISTANT | Admitting: PHYSICIAN ASSISTANT
Payer: MEDICARE

## 2023-12-23 VITALS
HEIGHT: 63 IN | WEIGHT: 166 LBS | RESPIRATION RATE: 16 BRPM | HEART RATE: 56 BPM | DIASTOLIC BLOOD PRESSURE: 54 MMHG | BODY MASS INDEX: 29.41 KG/M2 | SYSTOLIC BLOOD PRESSURE: 110 MMHG | OXYGEN SATURATION: 93 % | TEMPERATURE: 98.6 F

## 2023-12-23 DIAGNOSIS — K83.09 CHOLANGITIS (H): ICD-10-CM

## 2023-12-23 LAB
ALBUMIN SERPL BCG-MCNC: 3.6 G/DL (ref 3.5–5.2)
ALBUMIN UR-MCNC: 30 MG/DL
ALP SERPL-CCNC: 729 U/L (ref 40–150)
ALT SERPL W P-5'-P-CCNC: 165 U/L (ref 0–50)
ANION GAP SERPL CALCULATED.3IONS-SCNC: 14 MMOL/L (ref 7–15)
APPEARANCE UR: ABNORMAL
AST SERPL W P-5'-P-CCNC: 193 U/L (ref 0–45)
BACTERIA #/AREA URNS HPF: ABNORMAL /HPF
BASOPHILS # BLD AUTO: 0 10E3/UL (ref 0–0.2)
BASOPHILS NFR BLD AUTO: 0 %
BILIRUB DIRECT SERPL-MCNC: 3.62 MG/DL (ref 0–0.3)
BILIRUB SERPL-MCNC: 4.5 MG/DL
BILIRUB UR QL STRIP: ABNORMAL
BUN SERPL-MCNC: 15.5 MG/DL (ref 8–23)
CALCIUM SERPL-MCNC: 8.8 MG/DL (ref 8.8–10.2)
CHLORIDE SERPL-SCNC: 93 MMOL/L (ref 98–107)
COLOR UR AUTO: ABNORMAL
CREAT SERPL-MCNC: 1.09 MG/DL (ref 0.51–0.95)
DEPRECATED HCO3 PLAS-SCNC: 28 MMOL/L (ref 22–29)
EGFRCR SERPLBLD CKD-EPI 2021: 51 ML/MIN/1.73M2
EOSINOPHIL # BLD AUTO: 0 10E3/UL (ref 0–0.7)
EOSINOPHIL NFR BLD AUTO: 0 %
ERYTHROCYTE [DISTWIDTH] IN BLOOD BY AUTOMATED COUNT: 12.9 % (ref 10–15)
FLUAV RNA SPEC QL NAA+PROBE: NEGATIVE
FLUBV RNA RESP QL NAA+PROBE: NEGATIVE
GLUCOSE SERPL-MCNC: 164 MG/DL (ref 70–99)
GLUCOSE UR STRIP-MCNC: NEGATIVE MG/DL
HCT VFR BLD AUTO: 43.4 % (ref 35–47)
HGB BLD-MCNC: 15.1 G/DL (ref 11.7–15.7)
HGB UR QL STRIP: ABNORMAL
HOLD SPECIMEN: NORMAL
HOLD SPECIMEN: NORMAL
IMM GRANULOCYTES # BLD: 0.1 10E3/UL
IMM GRANULOCYTES NFR BLD: 1 %
KETONES UR STRIP-MCNC: NEGATIVE MG/DL
LACTATE SERPL-SCNC: 1.6 MMOL/L (ref 0.7–2)
LEUKOCYTE ESTERASE UR QL STRIP: ABNORMAL
LIPASE SERPL-CCNC: 45 U/L (ref 13–60)
LYMPHOCYTES # BLD AUTO: 0.5 10E3/UL (ref 0.8–5.3)
LYMPHOCYTES NFR BLD AUTO: 3 %
MCH RBC QN AUTO: 31.9 PG (ref 26.5–33)
MCHC RBC AUTO-ENTMCNC: 34.8 G/DL (ref 31.5–36.5)
MCV RBC AUTO: 92 FL (ref 78–100)
MONOCYTES # BLD AUTO: 1.3 10E3/UL (ref 0–1.3)
MONOCYTES NFR BLD AUTO: 8 %
MUCOUS THREADS #/AREA URNS LPF: PRESENT /LPF
NEUTROPHILS # BLD AUTO: 14.6 10E3/UL (ref 1.6–8.3)
NEUTROPHILS NFR BLD AUTO: 88 %
NITRATE UR QL: POSITIVE
NRBC # BLD AUTO: 0 10E3/UL
NRBC BLD AUTO-RTO: 0 /100
PH UR STRIP: 5.5 [PH] (ref 5–9)
PLATELET # BLD AUTO: 228 10E3/UL (ref 150–450)
POTASSIUM SERPL-SCNC: 3 MMOL/L (ref 3.4–5.3)
PROT SERPL-MCNC: 7.1 G/DL (ref 6.4–8.3)
RBC # BLD AUTO: 4.73 10E6/UL (ref 3.8–5.2)
RBC URINE: 4 /HPF
RSV RNA SPEC NAA+PROBE: NEGATIVE
SARS-COV-2 RNA RESP QL NAA+PROBE: NEGATIVE
SODIUM SERPL-SCNC: 135 MMOL/L (ref 135–145)
SP GR UR STRIP: 1.01 (ref 1–1.03)
SQUAMOUS EPITHELIAL: 3 /HPF
UROBILINOGEN UR STRIP-MCNC: 2 MG/DL
WBC # BLD AUTO: 16.6 10E3/UL (ref 4–11)
WBC URINE: 20 /HPF

## 2023-12-23 PROCEDURE — 96365 THER/PROPH/DIAG IV INF INIT: CPT | Performed by: PHYSICIAN ASSISTANT

## 2023-12-23 PROCEDURE — 87637 SARSCOV2&INF A&B&RSV AMP PRB: CPT | Performed by: PHYSICIAN ASSISTANT

## 2023-12-23 PROCEDURE — 85025 COMPLETE CBC W/AUTO DIFF WBC: CPT | Performed by: PHYSICIAN ASSISTANT

## 2023-12-23 PROCEDURE — C9803 HOPD COVID-19 SPEC COLLECT: HCPCS | Performed by: PHYSICIAN ASSISTANT

## 2023-12-23 PROCEDURE — 36415 COLL VENOUS BLD VENIPUNCTURE: CPT | Performed by: PHYSICIAN ASSISTANT

## 2023-12-23 PROCEDURE — 83690 ASSAY OF LIPASE: CPT | Performed by: PHYSICIAN ASSISTANT

## 2023-12-23 PROCEDURE — 258N000003 HC RX IP 258 OP 636: Performed by: PHYSICIAN ASSISTANT

## 2023-12-23 PROCEDURE — 96361 HYDRATE IV INFUSION ADD-ON: CPT | Performed by: PHYSICIAN ASSISTANT

## 2023-12-23 PROCEDURE — 250N000011 HC RX IP 250 OP 636: Performed by: PHYSICIAN ASSISTANT

## 2023-12-23 PROCEDURE — 82040 ASSAY OF SERUM ALBUMIN: CPT | Performed by: PHYSICIAN ASSISTANT

## 2023-12-23 PROCEDURE — 87040 BLOOD CULTURE FOR BACTERIA: CPT | Performed by: PHYSICIAN ASSISTANT

## 2023-12-23 PROCEDURE — 99285 EMERGENCY DEPT VISIT HI MDM: CPT | Performed by: PHYSICIAN ASSISTANT

## 2023-12-23 PROCEDURE — 250N000013 HC RX MED GY IP 250 OP 250 PS 637: Performed by: PHYSICIAN ASSISTANT

## 2023-12-23 PROCEDURE — 99285 EMERGENCY DEPT VISIT HI MDM: CPT | Mod: 25 | Performed by: PHYSICIAN ASSISTANT

## 2023-12-23 PROCEDURE — 81001 URINALYSIS AUTO W/SCOPE: CPT | Performed by: PHYSICIAN ASSISTANT

## 2023-12-23 PROCEDURE — 83605 ASSAY OF LACTIC ACID: CPT | Performed by: PHYSICIAN ASSISTANT

## 2023-12-23 RX ORDER — SODIUM CHLORIDE 9 MG/ML
INJECTION, SOLUTION INTRAVENOUS CONTINUOUS
Status: DISCONTINUED | OUTPATIENT
Start: 2023-12-23 | End: 2023-12-24 | Stop reason: HOSPADM

## 2023-12-23 RX ORDER — ONDANSETRON 2 MG/ML
4 INJECTION INTRAMUSCULAR; INTRAVENOUS EVERY 6 HOURS PRN
Status: DISCONTINUED | OUTPATIENT
Start: 2023-12-23 | End: 2023-12-24 | Stop reason: HOSPADM

## 2023-12-23 RX ORDER — MORPHINE SULFATE 2 MG/ML
2 INJECTION, SOLUTION INTRAMUSCULAR; INTRAVENOUS EVERY 4 HOURS PRN
Status: DISCONTINUED | OUTPATIENT
Start: 2023-12-23 | End: 2023-12-24 | Stop reason: HOSPADM

## 2023-12-23 RX ORDER — PIPERACILLIN SODIUM, TAZOBACTAM SODIUM 3; .375 G/15ML; G/15ML
3.38 INJECTION, POWDER, LYOPHILIZED, FOR SOLUTION INTRAVENOUS EVERY 8 HOURS
Status: DISCONTINUED | OUTPATIENT
Start: 2023-12-23 | End: 2023-12-23

## 2023-12-23 RX ORDER — PIPERACILLIN SODIUM, TAZOBACTAM SODIUM 3; .375 G/15ML; G/15ML
3.38 INJECTION, POWDER, LYOPHILIZED, FOR SOLUTION INTRAVENOUS EVERY 6 HOURS
Status: DISCONTINUED | OUTPATIENT
Start: 2023-12-23 | End: 2023-12-24 | Stop reason: HOSPADM

## 2023-12-23 RX ORDER — SODIUM CHLORIDE, SODIUM LACTATE, POTASSIUM CHLORIDE, CALCIUM CHLORIDE 600; 310; 30; 20 MG/100ML; MG/100ML; MG/100ML; MG/100ML
INJECTION, SOLUTION INTRAVENOUS CONTINUOUS
Status: DISCONTINUED | OUTPATIENT
Start: 2023-12-23 | End: 2023-12-23

## 2023-12-23 RX ORDER — POTASSIUM CHLORIDE 1500 MG/1
20 TABLET, EXTENDED RELEASE ORAL ONCE
Status: COMPLETED | OUTPATIENT
Start: 2023-12-23 | End: 2023-12-23

## 2023-12-23 RX ORDER — ACETAMINOPHEN 325 MG/1
650 TABLET ORAL EVERY 6 HOURS PRN
Status: DISCONTINUED | OUTPATIENT
Start: 2023-12-23 | End: 2023-12-24 | Stop reason: HOSPADM

## 2023-12-23 RX ORDER — DICYCLOMINE HCL 20 MG
20 TABLET ORAL 4 TIMES DAILY PRN
Qty: 20 TABLET | Refills: 0 | Status: SHIPPED | OUTPATIENT
Start: 2023-12-23 | End: 2023-12-23

## 2023-12-23 RX ADMIN — SODIUM CHLORIDE, POTASSIUM CHLORIDE, SODIUM LACTATE AND CALCIUM CHLORIDE: 600; 310; 30; 20 INJECTION, SOLUTION INTRAVENOUS at 18:50

## 2023-12-23 RX ADMIN — SODIUM CHLORIDE: 9 INJECTION, SOLUTION INTRAVENOUS at 21:49

## 2023-12-23 RX ADMIN — SODIUM CHLORIDE 1000 ML: 9 INJECTION, SOLUTION INTRAVENOUS at 20:08

## 2023-12-23 RX ADMIN — PIPERACILLIN AND TAZOBACTAM 3.38 G: 3; .375 INJECTION, POWDER, LYOPHILIZED, FOR SOLUTION INTRAVENOUS at 20:09

## 2023-12-23 RX ADMIN — POTASSIUM CHLORIDE 20 MEQ: 1500 TABLET, EXTENDED RELEASE ORAL at 21:49

## 2023-12-23 ASSESSMENT — ACTIVITIES OF DAILY LIVING (ADL)
ADLS_ACUITY_SCORE: 35

## 2023-12-23 NOTE — ED TRIAGE NOTES
"Patient being evaluated today for upper abdominal pain, nausea, and vomiting. She reports an upcoming workup for similar symptoms. She also notes dizziness, cough, and sinus drainage. BP 96/62   Pulse 76   Temp 98.6  F (37  C) (Tympanic)   Resp 16   Ht 1.6 m (5' 3\")   Wt 75.3 kg (166 lb)   LMP 01/21/1997 (Approximate)   SpO2 92%   BMI 29.41 kg/m         Triage Assessment (Adult)       Row Name 12/23/23 1991          Triage Assessment    Airway WDL WDL        Respiratory WDL    Respiratory WDL X  02 saturation 92%        Skin Circulation/Temperature WDL    Skin Circulation/Temperature WDL WDL        Cardiac WDL    Cardiac WDL X   hypotensive        Peripheral/Neurovascular WDL    Peripheral Neurovascular WDL WDL        Cognitive/Neuro/Behavioral WDL    Cognitive/Neuro/Behavioral WDL X  C/O dizziness                     "

## 2023-12-24 ENCOUNTER — TRANSFERRED RECORDS (OUTPATIENT)
Dept: HEALTH INFORMATION MANAGEMENT | Facility: OTHER | Age: 83
End: 2023-12-24
Payer: COMMERCIAL

## 2023-12-24 ASSESSMENT — ACTIVITIES OF DAILY LIVING (ADL): ADLS_ACUITY_SCORE: 35

## 2023-12-24 NOTE — ED PROVIDER NOTES
"CC:     Chief Complaint   Patient presents with    Abdominal Pain     Upper abdominal pain that started at noon today.    Nausea & Vomiting     X3 today     ED Nurse's notes:  Patient being evaluated today for upper abdominal pain, nausea, and vomiting. She reports an upcoming workup for similar symptoms. She also notes dizziness, cough, and sinus drainage. BP 96/62   Pulse 76   Temp 98.6  F (37  C) (Tympanic)   Resp 16   Ht 1.6 m (5' 3\")   Wt 75.3 kg (166 lb)   LMP 01/21/1997 (Approximate)   SpO2 92%   BMI 29.41 kg/m         Triage Assessment (Adult)       Row Name 12/23/23 9924          Triage Assessment    Airway WDL WDL        Respiratory WDL    Respiratory WDL X  02 saturation 92%        Skin Circulation/Temperature WDL    Skin Circulation/Temperature WDL WDL        Cardiac WDL    Cardiac WDL X   hypotensive        Peripheral/Neurovascular WDL    Peripheral Neurovascular WDL WDL        Cognitive/Neuro/Behavioral WDL    Cognitive/Neuro/Behavioral WDL X  C/O dizziness                   HPI:    Madiha Beltran is a 83 year old female who presented to the emergency department with her  with increased abdominal pain, nausea, vomiting, loose stools, dark urine, weakness.  She states she has not felt well for the past few months.  She saw her primary care provider in November with concerns of dark orange urine and her skin turning yellow.    She has had similar symptoms in the past and was last seen gastroenterology in May 2022.  She had an open cholecystectomy in 1980.  She has a history of a mildly dilated common bile duct measuring up to 13 mm that tapers gradually to the ampulla.  At the time of her appointment in [place] she was no longer having dark urine or jaundice.  They had discussed EUS ERCP at that time but since her symptoms had resolved decided to hold off.  She had been doing well up until a month ago.  Her abdominal pain is described as upper bandlike abdominal pain that radiates to her " back between her shoulder blades.  She has intermittent nausea and vomiting as well as loose stools that are pale in color.  She has lost 6 pounds since the beginning of the week as she cannot eat much.  She also notes a lot of itching and this morning she had shaking chills.    Past Medical History:   Past Medical History:   Diagnosis Date    Benign neoplasm of connective and other soft tissue, unspecified     Benign myxoma, right knee    Chronic hepatitis (H)     Chronic hepatitis, mild, stable    Diaphragmatic hernia without obstruction or gangrene     No Comments Provided    Disease of stomach and duodenum     Peptic acid disease, esophageal reflux    Frequency of micturition     No Comments Provided    Gastro-esophageal reflux disease without esophagitis     No Comments Provided    Hypothyroidism     No Comments Provided    Major depressive disorder, recurrent, in full remission (H24)     No Comments Provided    Malignant neoplasm of endometrium (H)     No Comments Provided    Nontoxic multinodular goiter     No Comments Provided    Other bursitis of hip, right hip     No Comments Provided    Other fecal abnormalities     No Comments Provided    Other seborrheic keratosis     No Comments Provided    Other specified disorders of bone density and structure, unspecified site (CODE)     No Comments Provided    Other specified disorders of veins     No Comments Provided    Prediabetes     No Comments Provided    Primary generalized (osteo)arthritis     No Comments Provided    Pure hypercholesterolemia     No Comments Provided    Pure hyperglyceridemia     No Comments Provided    Tear of right meniscus as current injury     Torn medial and lateral meniscus right knee with popliteal cyst    Wheezing     Wheezing with possible angina        Past Surgical History:   Past Surgical History:   Procedure Laterality Date    ARTHROPLASTY KNEE      6/2011,right knee arthroplasty    ARTHROPLASTY KNEE      07/2011, left knee  arthroplasty    ARTHROSCOPY KNEE      Right knee surgery removal of myxoma    ARTHROSCOPY KNEE      1/16/01,Arthroscopy left knee and right knee.    CHOLECYSTECTOMY      No Comments Provided    COLONOSCOPY      1997,Colonoscopy - Next one due 01/07    COLONOSCOPY      7/31/06,Colonoscopy normal, colonoscopy due in 2016    COLONOSCOPY N/A     8/4/2016,wnl no need for fu    ESOPHAGOSCOPY, GASTROSCOPY, DUODENOSCOPY (EGD), COMBINED      No Comments Provided    ESOPHAGOSCOPY, GASTROSCOPY, DUODENOSCOPY (EGD), COMBINED      7/27/2017    FINGER SURGERY      05/07,Reconstruct left first metacarpocarpal joint    HYSTERECTOMY TOTAL ABDOMINAL, BILATERAL SALPINGO-OOPHORECTOMY, COMBINED      1/21/97,ILIANA/BSO for endometrial carcinoma    HYSTERECTOMY TOTAL ABDOMINAL, BILATERAL SALPINGO-OOPHORECTOMY, COMBINED      1999    LAPAROSCOPIC TUBAL LIGATION      No Comments Provided    LAPAROSCOPY DIAGNOSTIC (GENERAL)      No Comments Provided    OTHER SURGICAL HISTORY      06/08,,HERNIA REPAIR,Periumbilical incisional hernia, repaired    OTHER SURGICAL HISTORY      06/03,200003,HM DEXA SCAN,DXA normal    OTHER SURGICAL HISTORY      7/31/2014,22772.0,NE COLONOSCOPY BIOPSY SINGLE OR MULTIPLE    THYROIDECTOMY      09/2004,Bilateral thyroidectomy for Hashimoto's thyroiditis        Social History:   Social History     Tobacco Use    Smoking status: Never    Smokeless tobacco: Never   Vaping Use    Vaping Use: Never used   Substance Use Topics    Alcohol use: No    Drug use: Never       Medications:    Current Outpatient Rx   Medication Sig Dispense Refill    atorvastatin (LIPITOR) 40 MG tablet Take 1 tablet (40 mg) by mouth daily 90 tablet 3    celecoxib (CELEBREX) 100 MG capsule Take 1 capsule (100 mg) by mouth 2 times daily 180 capsule 3    clindamycin (CLEOCIN) 300 MG capsule TK 2 CS PO 1 HOUR PRIOR TO PROCEDURE      fluocinonide (LIDEX) 0.05 % external cream Use daily as needed for rash. 30 g 11    FLUoxetine (PROZAC) 20 MG capsule  TAKE 1 CAPSULE EVERY MORNING 90 capsule 3    hydrochlorothiazide (HYDRODIURIL) 25 MG tablet Take 1 tablet (25 mg) by mouth daily 90 tablet 3    levothyroxine (SYNTHROID/LEVOTHROID) 125 MCG tablet Take 1 tablet (125 mcg) by mouth daily 90 tablet 3    metoprolol tartrate (LOPRESSOR) 100 MG tablet Take 1 tablet (100 mg) by mouth 2 times daily 180 tablet 3    Multiple Vitamin (MULTI-VITAMINS) TABS       omeprazole (PRILOSEC) 20 MG DR capsule TAKE 1 CAPSULE EVERY DAY 90 capsule 3    oxybutynin (DITROPAN) 5 MG tablet Take 1 tablet (5 mg) by mouth daily 90 tablet 3    potassium chloride ER (KLOR-CON) 10 MEQ CR tablet Take 2 tablets (20 mEq) by mouth daily 180 tablet 3    sucralfate (CARAFATE) 1 GM tablet Take 1 tablet (1 g) by mouth 3 times daily (before meals) 270 tablet 3    Ubiquinol 100 MG CAPS          Allergies:  Bicillin c-r,; Diatrizoate; and Sulfa antibiotics      Medical records were reviewed and are summarized above.      Review of Systems     Complete review of systems negative except as noted in HPI    Immunization status was reviewed   Immunization History   Administered Date(s) Administered    COVID-19 12+ (2023-24) (Pfizer) 10/06/2023    COVID-19 Bivalent 12+ (Pfizer) 11/02/2022    COVID-19 MONOVALENT 12+ (Pfizer) 02/09/2021, 03/02/2021, 10/04/2021    COVID-19 Monovalent 12+ (Pfizer 2022) 07/07/2022    Flu 65+ Years 10/24/2017    Flu, Unspecified 10/01/2008, 10/15/2009, 10/18/2010, 10/13/2011, 10/24/2013    Influenza (High Dose) 3 valent vaccine 10/02/2018, 10/24/2019    Influenza (IIV3) PF 10/19/2007, 10/16/2008, 10/15/2009, 10/13/2011    Influenza Vaccine 65+ (Fluzone HD) 10/15/2020, 10/04/2021, 10/26/2022, 10/06/2023    Influenza Vaccine >6 months,quad, PF 10/15/2014, 10/09/2015, 10/04/2016    Pneumo Conj 13-V (2010&after) 06/19/2015    Pneumococcal 23 valent 06/14/2011, 07/05/2021    TDAP (Adacel,Boostrix) 07/07/2022, 07/15/2022    TDAP Vaccine (Boostrix) 06/07/2012    Zoster recombinant adjuvanted  (SHINGRIX) 07/05/2021, 09/07/2021    Zoster vaccine, live 10/21/2009         Physical Exam     Vitals:    12/23/23 2000 12/23/23 2100 12/23/23 2103 12/23/23 2200   BP:   107/62 117/53   Pulse: 64 64  59   Resp:       Temp:       TempSrc:       SpO2: 93% 96% 97% 92%   Weight:       Height:           GENERAL APPEARANCE: 83 year old female who appears ill, but does not appear toxic  FACE: normal facies  EYES: Pupils are equal, scleral icterus present  NECK: No palpable lymphadenopathy, and there is no apparent tenderness. No asymmetry noted  RESP: normal respiratory effort. Clear breath sounds bilaterally, with good air movement throughout.  CV: regular rate and rhythm.  No significant murmurs, gallops or rubs  ABD: soft, non distended, tender in epigastric and RUQ, no peritoneal signs  MS: no gross deformities noted; normal muscle tone.  SKIN: jaundice present  NEURO: no facial droop; no focal deficits, speech is normal  PSYCH: Normal mood and affect is congruent.  Behavior is normal. Thought content normal. Speech is not slurred.       Available Lab/Imaging Results from the past 48 hours     Results for orders placed or performed during the hospital encounter of 12/23/23 (from the past 24 hour(s))   Symptomatic Influenza A/B, RSV, & SARS-CoV2 PCR (COVID-19) Nose    Specimen: Nose; Swab   Result Value Ref Range    Influenza A PCR Negative Negative    Influenza B PCR Negative Negative    RSV PCR Negative Negative    SARS CoV2 PCR Negative Negative    Narrative    Testing was performed using the Xpert Xpress CoV2/Flu/RSV Assay on the [employer] GeneXpert Instrument. This test should be ordered for the detection of SARS-CoV-2, influenza, and RSV viruses in individuals who meet clinical and/or epidemiological criteria. Test performance is unknown in asymptomatic patients. This test is for in vitro diagnostic use under the FDA EUA for laboratories certified under CLIA to perform high or moderate complexity testing. This test  has not been FDA cleared or approved. A negative result does not rule out the presence of PCR inhibitors in the specimen or target RNA in concentration below the limit of detection for the assay. If only one viral target is positive but coinfection with multiple targets is suspected, the sample should be re-tested with another FDA cleared, approved, or authorized test, if coinfection would change clinical management. This test was validated by the [facility] [employer]. These laboratories are certified under the Clinical Laboratory Improvement Amendments of 1988 (CLIA-88) as qualified to perform high complexity laboratory testing.   Hepatic panel   Result Value Ref Range    Protein Total 7.1 6.4 - 8.3 g/dL    Albumin 3.6 3.5 - 5.2 g/dL    Bilirubin Total 4.5 (H) <=1.2 mg/dL    Alkaline Phosphatase 729 (H) 40 - 150 U/L     (H) 0 - 45 U/L     (H) 0 - 50 U/L    Bilirubin Direct 3.62 (H) 0.00 - 0.30 mg/dL   CBC with platelets differential    Narrative    The following orders were created for panel order CBC with platelets differential.  Procedure                               Abnormality         Status                     ---------                               -----------         ------                     CBC with platelets and d...[[id number]]  Abnormal            Final result                 Please view results for these tests on the individual orders.   Basic metabolic panel   Result Value Ref Range    Sodium 135 135 - 145 mmol/L    Potassium 3.0 (L) 3.4 - 5.3 mmol/L    Chloride 93 (L) 98 - 107 mmol/L    Carbon Dioxide (CO2) 28 22 - 29 mmol/L    Anion Gap 14 7 - 15 mmol/L    Urea Nitrogen 15.5 8.0 - 23.0 mg/dL    Creatinine 1.09 (H) 0.51 - 0.95 mg/dL    GFR Estimate 51 (L) >60 mL/min/1.73m2    Calcium 8.8 8.8 - 10.2 mg/dL    Glucose 164 (H) 70 - 99 mg/dL   Lipase   Result Value Ref Range    Lipase 45 13 - 60 U/L   Lactic acid whole blood   Result Value Ref Range    Lactic Acid 1.6 0.7 - 2.0  mmol/L   [place] Draw    Narrative    The following orders were created for panel order [place] Draw.  Procedure                               Abnormality         Status                     ---------                               -----------         ------                     Extra Blue Top Tube[[id number]]                              Final result               Extra Red Top Tube[[id number]]                               Final result                 Please view results for these tests on the individual orders.   CBC with platelets and differential   Result Value Ref Range    WBC Count 16.6 (H) 4.0 - 11.0 10e3/uL    RBC Count 4.73 3.80 - 5.20 10e6/uL    Hemoglobin 15.1 11.7 - 15.7 g/dL    Hematocrit 43.4 35.0 - 47.0 %    MCV 92 78 - 100 fL    MCH 31.9 26.5 - 33.0 pg    MCHC 34.8 31.5 - 36.5 g/dL    RDW 12.9 10.0 - 15.0 %    Platelet Count 228 150 - 450 10e3/uL    % Neutrophils 88 %    % Lymphocytes 3 %    % Monocytes 8 %    % Eosinophils 0 %    % Basophils 0 %    % Immature Granulocytes 1 %    NRBCs per 100 WBC 0 <1 /100    Absolute Neutrophils 14.6 (H) 1.6 - 8.3 10e3/uL    Absolute Lymphocytes 0.5 (L) 0.8 - 5.3 10e3/uL    Absolute Monocytes 1.3 0.0 - 1.3 10e3/uL    Absolute Eosinophils 0.0 0.0 - 0.7 10e3/uL    Absolute Basophils 0.0 0.0 - 0.2 10e3/uL    Absolute Immature Granulocytes 0.1 <=0.4 10e3/uL    Absolute NRBCs 0.0 10e3/uL   Extra Blue Top Tube   Result Value Ref Range    Hold Specimen JI    Extra Red Top Tube   Result Value Ref Range    Hold Specimen [facility]    Urine Macroscopic with reflex to Microscopic   Result Value Ref Range    Color Urine Dark Yellow (A) Colorless, Straw, Light Yellow, Yellow    Appearance Urine Slightly Cloudy (A) Clear    Glucose Urine Negative Negative mg/dL    Bilirubin Urine Small (A) Negative    Ketones Urine Negative Negative mg/dL    Specific Gravity Urine 1.014 1.000 - 1.030    Blood Urine Trace (A) Negative    pH Urine 5.5 5.0 - 9.0    Protein Albumin Urine 30 (A) Negative mg/dL     Urobilinogen Urine 2.0 Normal, 2.0 mg/dL    Nitrite Urine Positive (A) Negative    Leukocyte Esterase Urine Moderate (A) Negative    Bacteria Urine Moderate (A) None Seen /HPF    RBC Urine 4 (H) <=2 /HPF    WBC Urine 20 (H) <=5 /HPF    Squamous Epithelials Urine 3 (H) <=1 /HPF    Mucus Urine Present (A) None Seen /LPF     Blood cultures ordered and pending    Medicine used during this ED stay:     Medications   piperacillin-tazobactam (ZOSYN) 3.375 g vial to attach to  mL bag (0 g Intravenous Stopped 12/23/23 2047)   sodium chloride 0.9 % infusion ( Intravenous $New Bag 12/23/23 2149)   ondansetron (ZOFRAN) injection 4 mg (has no administration in time range)   acetaminophen (TYLENOL) tablet 650 mg (has no administration in time range)   morphine (PF) injection 2 mg (has no administration in time range)   sodium chloride 0.9% BOLUS 1,000 mL (0 mLs Intravenous Stopped 12/23/23 2102)   potassium chloride ER (KLOR-CON M) CR tablet 20 mEq (20 mEq Oral $Given 12/23/23 2149)         Impression     Final diagnoses:   Cholangitis (H28)       ED Course     Patient first seen at 6:14 PM    Patient arrives with her  complaining of worsening abdominal pain, nausea, vomiting, jaundice, and dark-colored urine.  She has been following with her primary care for this over the past month and has a referral into GI at [facility], but does not have an appointment until January 15.  MRCP showed dilated common bile duct measuring 13 mm and tapering down at the ampulla raising concern for stenosis.  She also has evidence of filling defects within the common bile duct.  She had her gallbladder removed in 1980.  She had similar symptoms in 2022 and was referred to GI at that time.  By the time she had seen GI her symptoms had improved and they decided to hold off on ERCP.  This morning she developed chills and shakes.  She was also very weak.  She has lost 6 pounds since the beginning of the week as she is unable to keep any  food down.    Given her history and symptoms, I am concerned for cholangitis.  Repeated labs and her total bilirubin has increased from 2.7-4.5 (direct bilirubin 3.62).  Alk phos, ALT, AST are also increased from previous labs.  Her white count is elevated at 16,000.  Lipase is within normal limits.  Lactic acid is within normal limits.  I did not get repeat imaging as her last MRCP was on 12/5/2023 and she is having similar symptoms, but they are getting worse.  She was treated with IV fluids and IV Zosyn.  Blood cultures were drawn and are pending.    Discussed with gastroenterology who recommends she be transferred for ERCP.  Discussed with [facility] who will have ERCP available tomorrow and has agreed to accept the patient.  Patient will be transported via BLS.  She is transferring in stable condition.           Karyna Byrne PA-C

## 2023-12-26 ENCOUNTER — PATIENT OUTREACH (OUTPATIENT)
Dept: CARE COORDINATION | Facility: CLINIC | Age: 83
End: 2023-12-26
Payer: COMMERCIAL

## 2023-12-26 NOTE — PROGRESS NOTES
Transitional Care Management Phone Call    Summary of hospitalization:  See outside records, reviewed and scanned  [facility]ly [facility]     DISCHARGE DIAGNOSIS: Obstructive jaundice, abnormal LFTs, hypothyroidism, JAKE    DATE OF DISCHARGE: 12-    Diagnostic Tests/Treatments reviewed.  Follow up needed: Labwork on renal function and potassium    Post Discharge Medication Reconciliation: discharge medications reconciled and changed, per note/orders. She is on 500 mg of Cipro for a total of 7 days.     Medications reviewed by: by myself    Problems taking medications regularly:  None    Problems adhering to non-medication therapy:  None    Other Healthcare Providers Involved in Patient s Care:         Specialist appointment - GI specialist    Update since discharge: improved. She feels tired. No pain or returning symptoms.      Plan of care communicated with patient    Just a friendly reminder that you appointment is on 1/2/2024 with Caity Calderon.  We encourage you to keep this appointment.  Please remember to bring all of your pills in their bottles (including any vitamins or over the counter pills) with you to your appointment.     The patient indicates understanding of these issues and agrees with the plan of care.   Other, see documentation.  She did not have follow-up yet. Was recommended for one week. Transferred to  and scheduled as above.      Was the patient contacted within the 2 business days or other approved timeframe?  Yes    Was the Medication reconciliation and management done since the patient was discharged? Yes    Joycelyn Kaye RN  12/26/2023 1:25 PM

## 2023-12-27 ENCOUNTER — OFFICE VISIT (OUTPATIENT)
Dept: FAMILY MEDICINE | Facility: OTHER | Age: 83
End: 2023-12-27
Attending: FAMILY MEDICINE
Payer: COMMERCIAL

## 2023-12-27 VITALS
DIASTOLIC BLOOD PRESSURE: 58 MMHG | OXYGEN SATURATION: 94 % | WEIGHT: 171.2 LBS | SYSTOLIC BLOOD PRESSURE: 102 MMHG | BODY MASS INDEX: 30.33 KG/M2 | RESPIRATION RATE: 20 BRPM | HEART RATE: 53 BPM | TEMPERATURE: 98.5 F

## 2023-12-27 DIAGNOSIS — U07.1 CLINICAL DIAGNOSIS OF COVID-19: ICD-10-CM

## 2023-12-27 DIAGNOSIS — U07.1 INFECTION DUE TO 2019 NOVEL CORONAVIRUS: ICD-10-CM

## 2023-12-27 DIAGNOSIS — N18.32 CHRONIC KIDNEY DISEASE, STAGE 3B (H): ICD-10-CM

## 2023-12-27 DIAGNOSIS — R05.1 ACUTE COUGH: ICD-10-CM

## 2023-12-27 DIAGNOSIS — U07.1 COVID-19 VIRUS INFECTION: Primary | ICD-10-CM

## 2023-12-27 PROCEDURE — G0463 HOSPITAL OUTPT CLINIC VISIT: HCPCS

## 2023-12-27 PROCEDURE — 99213 OFFICE O/P EST LOW 20 MIN: CPT | Performed by: FAMILY MEDICINE

## 2023-12-27 RX ORDER — CIPROFLOXACIN 500 MG/1
500 TABLET, FILM COATED ORAL
COMMUNITY
Start: 2023-12-25 | End: 2024-01-02

## 2023-12-27 ASSESSMENT — PAIN SCALES - GENERAL: PAINLEVEL: NO PAIN (0)

## 2023-12-27 NOTE — NURSING NOTE
Patient here for cough and congestion that started 3 days prior. She tested positive for COIVD today. Medication Reconciliation: complete.    Yumiko Jones LPN  12/27/2023 2:35 PM

## 2023-12-27 NOTE — PROGRESS NOTES
Assessment & Plan     COVID-19 virus infection    Assessment & Plan     COVID-19 virus infection  Patient wishes to pursue medication.  Because of her decreased kidney function will use half usual dose.  Also discussed if worsening to follow-up in clinic  - nirmatrelvir and ritonavir (PAXLOVID) 150 mg/100 mg therapy pack; Take 1 tablet by mouth 2 times daily for 5 days (Take one tablet of Nirmatelvir and 1 tablet of Ritonavir twice daily for 5 days)    Chronic kidney disease, stage 3b (H)  Currently stable          Infection due to 2019 novel coronavirus      Clinical diagnosis of COVID-19                   No follow-ups on file.    Sai Cisneros MD  [facility] 83 [facility], presenting for the following health issues:  Cough (Head congestion )      [facility]               [unfilled]         Objective    /58   Pulse 53   Temp 98.5  F (36.9  C)   Resp 20   Wt 77.7 kg (171 lb 3.2 oz)   LMP 01/21/1997 (Approximate)   SpO2 94%   BMI 30.33 kg/m    Body mass index is 30.33 kg/m .  [unfilled]   GENERAL: healthy, alert and no distress  NECK: no adenopathy, no asymmetry, masses, or scars and thyroid normal to palpation  RESP: lungs clear to auscultation - no rales, rhonchi or wheezes  CV: regular rate and rhythm, normal S1 S2, no S3 or S4, no murmur, click or rub, no peripheral edema and peripheral pulses strong  ABDOMEN: soft, nontender, no hepatosplenomegaly, no masses and bowel sounds normal  MS: no gross musculoskeletal defects noted, no edema                      Chronic kidney disease, stage 3b (H)  Stable                 No follow-ups on file.    Sai Cisneros MD  [facility]   Madiha is a 83 year old, presenting for the following health issues:  Cough (Head congestion )      Patient arrives here for cough.  She recently had her gallstones removed.  But 3 days ago developed a cough.  This morning she did  take a COVID test that was positive.  She feels chills but no fevers or chills.                   Review of Systems         Objective    /58   Pulse 53   Temp 98.5  F (36.9  C)   Resp 20   Wt 77.7 kg (171 lb 3.2 oz)   LMP 01/21/1997 (Approximate)   SpO2 94%   BMI 30.33 kg/m    Body mass index is 30.33 kg/m .  Physical Exam

## 2023-12-29 LAB
BACTERIA BLD CULT: NO GROWTH
BACTERIA BLD CULT: NO GROWTH

## 2024-01-02 ENCOUNTER — OFFICE VISIT (OUTPATIENT)
Dept: FAMILY MEDICINE | Facility: OTHER | Age: 84
End: 2024-01-02
Attending: NURSE PRACTITIONER
Payer: COMMERCIAL

## 2024-01-02 VITALS
HEART RATE: 56 BPM | RESPIRATION RATE: 20 BRPM | BODY MASS INDEX: 29.33 KG/M2 | TEMPERATURE: 98 F | WEIGHT: 165.6 LBS | OXYGEN SATURATION: 95 % | DIASTOLIC BLOOD PRESSURE: 56 MMHG | SYSTOLIC BLOOD PRESSURE: 84 MMHG

## 2024-01-02 DIAGNOSIS — K80.51 CALCULUS OF BILE DUCT WITHOUT CHOLECYSTITIS WITH OBSTRUCTION: Primary | ICD-10-CM

## 2024-01-02 DIAGNOSIS — N18.32 CHRONIC KIDNEY DISEASE, STAGE 3B (H): ICD-10-CM

## 2024-01-02 DIAGNOSIS — U07.1 INFECTION DUE TO 2019 NOVEL CORONAVIRUS: ICD-10-CM

## 2024-01-02 DIAGNOSIS — I10 ESSENTIAL HYPERTENSION: Chronic | ICD-10-CM

## 2024-01-02 LAB
ALBUMIN SERPL BCG-MCNC: 3.6 G/DL (ref 3.5–5.2)
ALP SERPL-CCNC: 412 U/L (ref 40–150)
ALT SERPL W P-5'-P-CCNC: 94 U/L (ref 0–50)
ANION GAP SERPL CALCULATED.3IONS-SCNC: 11 MMOL/L (ref 7–15)
AST SERPL W P-5'-P-CCNC: ABNORMAL U/L
BILIRUB SERPL-MCNC: 1.4 MG/DL
BUN SERPL-MCNC: 17.8 MG/DL (ref 8–23)
CALCIUM SERPL-MCNC: 8.7 MG/DL (ref 8.8–10.2)
CHLORIDE SERPL-SCNC: 95 MMOL/L (ref 98–107)
CREAT SERPL-MCNC: 1.16 MG/DL (ref 0.51–0.95)
DEPRECATED HCO3 PLAS-SCNC: 29 MMOL/L (ref 22–29)
EGFRCR SERPLBLD CKD-EPI 2021: 47 ML/MIN/1.73M2
GLUCOSE SERPL-MCNC: 118 MG/DL (ref 70–99)
HOLD SPECIMEN: NORMAL
POTASSIUM SERPL-SCNC: 4.2 MMOL/L (ref 3.4–5.3)
PROT SERPL-MCNC: 7.1 G/DL (ref 6.4–8.3)
SODIUM SERPL-SCNC: 135 MMOL/L (ref 135–145)

## 2024-01-02 PROCEDURE — 99496 TRANSJ CARE MGMT HIGH F2F 7D: CPT | Performed by: NURSE PRACTITIONER

## 2024-01-02 PROCEDURE — 84155 ASSAY OF PROTEIN SERUM: CPT | Mod: ZL | Performed by: NURSE PRACTITIONER

## 2024-01-02 PROCEDURE — 36415 COLL VENOUS BLD VENIPUNCTURE: CPT | Mod: ZL | Performed by: NURSE PRACTITIONER

## 2024-01-02 ASSESSMENT — PATIENT HEALTH QUESTIONNAIRE - PHQ9
SUM OF ALL RESPONSES TO PHQ QUESTIONS 1-9: 0
10. IF YOU CHECKED OFF ANY PROBLEMS, HOW DIFFICULT HAVE THESE PROBLEMS MADE IT FOR YOU TO DO YOUR WORK, TAKE CARE OF THINGS AT HOME, OR GET ALONG WITH OTHER PEOPLE: NOT DIFFICULT AT ALL
SUM OF ALL RESPONSES TO PHQ QUESTIONS 1-9: 0

## 2024-01-02 ASSESSMENT — PAIN SCALES - GENERAL: PAINLEVEL: NO PAIN (0)

## 2024-01-02 NOTE — NURSING NOTE
Patient presents today hospital follow up.    Medication Reconciliation Complete    Shelli Jones LPN  1/2/2024 10:15 AM

## 2024-01-02 NOTE — PROGRESS NOTES
Assessment & Plan   Problem List Items Addressed This Visit          Circulatory    Essential hypertension (Chronic)       Urinary    Chronic kidney disease, stage 3b (H)     Other Visit Diagnoses       Calculus of bile duct without cholecystitis with obstruction    -  Primary    Relevant Orders    Comprehensive Metabolic Panel    Infection due to 2019 novel coronavirus            Hypertension, on hydrochlorothiazide and metoprolol, blood pressures and pulse are actually low in office today.  Will have her hold hydrochlorothiazide, Metoprolol to 50 mg twice daily.  She will monitor blood pressures at home and follow-up in 1 to 2 weeks in the office for recheck, sooner if concerns.  CMP updated today for monitoring kidney functions as well as recent liver functions after calculus of bile duct with obstruction.  Overall she is feeling well.  COVID-19, continues with mild cough, lung sounds are clear.    Review of external notes as documented elsewhere in note  Ordering of each unique test  Prescription drug management       MED REC REQUIRED  Post Medication Reconciliation Status: discharge medications reconciled and changed, per note/orders      Return for with me 1-2 weeks for blood pressure check.    SHAUN Conner [facility]    [facility]   Madiha is a 83 year old, presenting for the following health issues:  Hospital F/U      History of Present Illness       Reason for visit:  Follow up    She eats 2-3 servings of fruits and vegetables daily.She consumes 0 sweetened beverage(s) daily.She exercises with enough effort to increase her heart rate 20 to 29 minutes per day.  She exercises with enough effort to increase her heart rate 5 days per week.   She is taking medications regularly.       Hospital Follow-up Visit:    Hospital/Nursing Home/IP Rehab Facility:  [facility]  Date of Admission: 12/24/23  Date of Discharge: 12/25/23  Reason(s) for Admission: Bile duct stone    Was  your hospitalization related to COVID-19? No   Problems taking medications regularly:  None  Medication changes since discharge: None  Problems adhering to non-medication therapy:  None    Summary of hospitalization:  See outside records, reviewed and scanned  Diagnostic Tests/Treatments reviewed.  Follow up needed: [facility]  Other Healthcare Providers Involved in Patient s Care:         None  Update since discharge: improved.         Plan of care communicated with patient           She presents to clinic today for hospital follow-up.  She was at [facility] from 12/24/2023 through 12/25/2023 for calculus of bile duct with obstruction.  She reports imaging, labs and procedure were completed, she is feeling well with minimal to no abdominal pain.  Shortly after discharge, she did test positive for COVID-19, was seen and started on Paxlovid.  She reports she has a mild cough but continues to improve.  She does not check blood pressures at home.  Blood pressure is low in clinic today.  She is taking hydrochlorothiazide 25 mg daily as well as metoprolol 100 mg twice daily.    Review of Systems   See above      Objective    BP (!) 84/56   Pulse 56   Temp 98  F (36.7  C)   Resp 20   Wt 75.1 kg (165 lb 9.6 oz)   LMP 01/21/1997 (Approximate)   SpO2 95%   BMI 29.33 kg/m    Body mass index is 29.33 kg/m .  Physical Exam   GENERAL: healthy, alert and no distress  EYES: Eyes grossly normal to inspection  RESP: lungs clear to auscultation - no rales, rhonchi or wheezes  CV: regular rate and rhythm, normal S1 S2  ABDOMEN: soft, nontender, no hepatosplenomegaly, no masses and bowel sounds normal  NEURO: Normal strength and tone, mentation intact and speech normal  PSYCH: mentation appears normal, affect normal/bright

## 2024-01-08 ASSESSMENT — PATIENT HEALTH QUESTIONNAIRE - PHQ9
SUM OF ALL RESPONSES TO PHQ QUESTIONS 1-9: 0
SUM OF ALL RESPONSES TO PHQ QUESTIONS 1-9: 0

## 2024-01-09 ENCOUNTER — OFFICE VISIT (OUTPATIENT)
Dept: FAMILY MEDICINE | Facility: OTHER | Age: 84
End: 2024-01-09
Attending: NURSE PRACTITIONER
Payer: COMMERCIAL

## 2024-01-09 VITALS
BODY MASS INDEX: 29.73 KG/M2 | RESPIRATION RATE: 20 BRPM | WEIGHT: 167.8 LBS | SYSTOLIC BLOOD PRESSURE: 98 MMHG | HEART RATE: 60 BPM | TEMPERATURE: 97.1 F | DIASTOLIC BLOOD PRESSURE: 64 MMHG | OXYGEN SATURATION: 97 % | HEIGHT: 63 IN

## 2024-01-09 DIAGNOSIS — K80.51 CALCULUS OF BILE DUCT WITHOUT CHOLECYSTITIS WITH OBSTRUCTION: ICD-10-CM

## 2024-01-09 DIAGNOSIS — I10 ESSENTIAL HYPERTENSION: ICD-10-CM

## 2024-01-09 DIAGNOSIS — N18.32 STAGE 3B CHRONIC KIDNEY DISEASE (H): Primary | ICD-10-CM

## 2024-01-09 PROCEDURE — G0463 HOSPITAL OUTPT CLINIC VISIT: HCPCS

## 2024-01-09 PROCEDURE — 99214 OFFICE O/P EST MOD 30 MIN: CPT | Performed by: NURSE PRACTITIONER

## 2024-01-09 RX ORDER — METOPROLOL TARTRATE 50 MG
50 TABLET ORAL 2 TIMES DAILY
Qty: 180 TABLET | Refills: 3 | Status: SHIPPED | OUTPATIENT
Start: 2024-01-09 | End: 2024-07-19

## 2024-01-09 ASSESSMENT — PAIN SCALES - GENERAL: PAINLEVEL: NO PAIN (0)

## 2024-01-09 NOTE — NURSING NOTE
Patient presents today for follow up on blood pressure.    Medication Reconciliation Complete    Shelli Jones LPN  1/9/2024 9:25 AM

## 2024-01-09 NOTE — PROGRESS NOTES
"  Assessment & Plan   Problem List Items Addressed This Visit          Circulatory    Essential hypertension (Chronic)    Relevant Orders    Comprehensive Metabolic Panel     Other Visit Diagnoses       Stage 3b chronic kidney disease (H)    -  Primary    Calculus of bile duct without cholecystitis with obstruction        Relevant Orders    Comprehensive Metabolic Panel           Blood pressures have stabilized and she is feeling much better.  Plan to continue with metoprolol 50 mg twice daily and remain off of hydrochlorothiazide at this time.  She had labs done with last time she was seen, liver functions had improved.  We will plan to recheck liver/kidney functions in a couple weeks, sooner if she is having any concerns.    Ordering of each unique test  Prescription drug management        No follow-ups on file.    SHAUN Conner [facility]   Madiha is a 83 year old, presenting for the following health issues:  RECHECK (Blood pressure)      HPI     She presents to clinic today for follow-up on hypertension.  Since reducing her medications, she reports she is no longer feeling as tired, has significantly increased energy and is not taking afternoon naps.  Home blood pressures have been checked twice daily, these range 116-133/65-79.  She also reports she is not having any further abdominal pain.  She continues to have a decreased appetite but this is improving, drinking fluids well.    Review of Systems   See above      Objective    BP 98/64   Pulse 60   Temp 97.1  F (36.2  C)   Resp 20   Ht 1.6 m (5' 3\")   Wt 76.1 kg (167 lb 12.8 oz)   LMP 01/21/1997 (Approximate)   SpO2 97%   BMI 29.72 kg/m    Body mass index is 29.72 kg/m .  Physical Exam   GENERAL: healthy, alert and no distress  EYES: Eyes grossly normal to inspection  RESP: lungs clear to auscultation - no rales, rhonchi or wheezes  CV: regular rate and rhythm, normal S1 S2  NEURO: Normal strength and " tone, mentation intact and speech normal  PSYCH: mentation appears normal, affect normal/bright                      Answers submitted by the patient for this visit:  Patient Health Questionnaire (Submitted on 1/8/2024)  PHQ9 TOTAL SCORE: 0

## 2024-01-24 ENCOUNTER — LAB (OUTPATIENT)
Dept: LAB | Facility: OTHER | Age: 84
End: 2024-01-24
Attending: INTERNAL MEDICINE
Payer: MEDICARE

## 2024-01-24 ENCOUNTER — TELEPHONE (OUTPATIENT)
Dept: FAMILY MEDICINE | Facility: OTHER | Age: 84
End: 2024-01-24

## 2024-01-24 DIAGNOSIS — I10 ESSENTIAL HYPERTENSION: ICD-10-CM

## 2024-01-24 DIAGNOSIS — K80.51 CALCULUS OF BILE DUCT WITHOUT CHOLECYSTITIS WITH OBSTRUCTION: ICD-10-CM

## 2024-01-24 LAB
ALBUMIN SERPL BCG-MCNC: 3.6 G/DL (ref 3.5–5.2)
ALP SERPL-CCNC: 181 U/L (ref 40–150)
ALT SERPL W P-5'-P-CCNC: 31 U/L (ref 0–50)
ANION GAP SERPL CALCULATED.3IONS-SCNC: 8 MMOL/L (ref 7–15)
AST SERPL W P-5'-P-CCNC: 36 U/L (ref 0–45)
BILIRUB SERPL-MCNC: 0.8 MG/DL
BUN SERPL-MCNC: 15.3 MG/DL (ref 8–23)
CALCIUM SERPL-MCNC: 8.5 MG/DL (ref 8.8–10.2)
CHLORIDE SERPL-SCNC: 104 MMOL/L (ref 98–107)
CREAT SERPL-MCNC: 0.91 MG/DL (ref 0.51–0.95)
DEPRECATED HCO3 PLAS-SCNC: 30 MMOL/L (ref 22–29)
EGFRCR SERPLBLD CKD-EPI 2021: 63 ML/MIN/1.73M2
GLUCOSE SERPL-MCNC: 122 MG/DL (ref 70–99)
HOLD SPECIMEN: NORMAL
POTASSIUM SERPL-SCNC: 4.2 MMOL/L (ref 3.4–5.3)
PROT SERPL-MCNC: 6.6 G/DL (ref 6.4–8.3)
SODIUM SERPL-SCNC: 142 MMOL/L (ref 135–145)

## 2024-01-24 PROCEDURE — 36415 COLL VENOUS BLD VENIPUNCTURE: CPT | Mod: ZL

## 2024-01-24 PROCEDURE — 82040 ASSAY OF SERUM ALBUMIN: CPT | Mod: ZL

## 2024-01-24 NOTE — TELEPHONE ENCOUNTER
Patient was notified and will follow up with her blood pressure readings.    Shelli Jones LPN on 1/24/2024 at 1:56 PM

## 2024-01-24 NOTE — TELEPHONE ENCOUNTER
Blood pressure medication was adjusted. Now her blood pressure is high. This morning it was 152/76. Please call.    Mirna Christian on 1/24/2024 at 10:29 AM

## 2024-01-24 NOTE — TELEPHONE ENCOUNTER
Patient reports that her blood pressure has been high for the last week and wanted to notify the provider. Patient has not been consistent when taking her blood pressure. Some of the readings have been before she takes the medication and some have been after.  I explained to the patient we would want to know her blood pressure readings while on the medication.    152/76  127/77  144/74  153/78, 130/74    Shelli Jones LPN on 1/24/2024 at 11:21 AM

## 2024-01-24 NOTE — TELEPHONE ENCOUNTER
Please have her monitor her blood pressures at the same time daily after she has taken her medications and sitting for 15-30 minutes. I would like to see these results over the next few days. SHAUN Conner CNP on 1/24/2024 at 12:07 PM

## 2024-01-25 ENCOUNTER — OFFICE VISIT (OUTPATIENT)
Dept: FAMILY MEDICINE | Facility: OTHER | Age: 84
End: 2024-01-25
Attending: NURSE PRACTITIONER
Payer: MEDICARE

## 2024-01-25 VITALS
TEMPERATURE: 98 F | BODY MASS INDEX: 30.02 KG/M2 | WEIGHT: 169.4 LBS | DIASTOLIC BLOOD PRESSURE: 68 MMHG | RESPIRATION RATE: 22 BRPM | OXYGEN SATURATION: 93 % | HEART RATE: 56 BPM | HEIGHT: 63 IN | SYSTOLIC BLOOD PRESSURE: 110 MMHG

## 2024-01-25 DIAGNOSIS — J10.1 INFLUENZA A: Primary | ICD-10-CM

## 2024-01-25 DIAGNOSIS — R05.1 ACUTE COUGH: ICD-10-CM

## 2024-01-25 LAB
FLUAV RNA SPEC QL NAA+PROBE: POSITIVE
FLUBV RNA RESP QL NAA+PROBE: NEGATIVE
RSV RNA SPEC NAA+PROBE: NEGATIVE
SARS-COV-2 RNA RESP QL NAA+PROBE: NEGATIVE

## 2024-01-25 PROCEDURE — 99213 OFFICE O/P EST LOW 20 MIN: CPT | Performed by: NURSE PRACTITIONER

## 2024-01-25 PROCEDURE — G0463 HOSPITAL OUTPT CLINIC VISIT: HCPCS

## 2024-01-25 PROCEDURE — 87637 SARSCOV2&INF A&B&RSV AMP PRB: CPT | Mod: ZL | Performed by: NURSE PRACTITIONER

## 2024-01-25 RX ORDER — OSELTAMIVIR PHOSPHATE 75 MG/1
75 CAPSULE ORAL 2 TIMES DAILY
Qty: 10 CAPSULE | Refills: 0 | Status: SHIPPED | OUTPATIENT
Start: 2024-01-25 | End: 2024-01-30

## 2024-01-25 ASSESSMENT — PAIN SCALES - GENERAL: PAINLEVEL: NO PAIN (0)

## 2024-01-25 ASSESSMENT — ENCOUNTER SYMPTOMS: COUGH: 1

## 2024-01-25 NOTE — PROGRESS NOTES
"  Assessment & Plan   Problem List Items Addressed This Visit    None  Visit Diagnoses       Influenza A    -  Primary    Relevant Medications    oseltamivir (TAMIFLU) 75 MG capsule    Acute cough        Relevant Orders    Symptomatic Influenza A/B, RSV, & SARS-CoV2 PCR (COVID-19) Nose (Completed)        Acute cough, nasal swab obtained and she is positive for influenza A.  Tamiflu ordered.  Discussed symptomatic management as well as signs and symptoms that would warrant follow-up.    Review of the result(s) of each unique test - covid/flu/rsv  Ordering of each unique test  Prescription drug management       BMI  Estimated body mass index is 30.01 kg/m  as calculated from the following:    Height as of this encounter: 1.6 m (5' 3\").    Weight as of this encounter: 76.8 kg (169 lb 6.4 oz).           No follow-ups on file.      Vanesa Cleary is a 83 year old, presenting for the following health issues:  Cough    History of Present Illness       Reason for visit:  Cough  Symptom onset:  1-3 days ago    She eats 2-3 servings of fruits and vegetables daily.She consumes 0 sweetened beverage(s) daily.She exercises with enough effort to increase her heart rate 20 to 29 minutes per day.  She exercises with enough effort to increase her heart rate 5 days per week.   She is taking medications regularly.     She comes into clinic today for evaluation of upper respiratory symptoms.  She got her RSV vaccine on Friday, had a sore arm.  Yesterday she noted a hoarse voice and some mild shortness of breath.  She is now coughing, sore throat.  Denies any fevers although low-grade temp this morning.  Has not taken anything over-the-counter for symptomatic management.  Drank coffee this morning which did ease her throat irritation.  She has no known ill contacts.        Review of Systems  See above      Objective    /68   Pulse 56   Temp 98  F (36.7  C)   Resp 22   Ht 1.6 m (5' 3\")   Wt 76.8 kg (169 lb 6.4 oz)   LMP " 01/21/1997 (Approximate)   SpO2 93%   BMI 30.01 kg/m    Body mass index is 30.01 kg/m .  Physical Exam   GENERAL: alert and no distress  EYES: Eyes grossly normal to inspection, PERRL and conjunctivae and sclerae normal  HENT: ear canals and TM's normal, nose and mouth without ulcers or lesions  NECK: no adenopathy, no asymmetry, masses, or scars  RESP: lungs clear to auscultation - no rales, rhonchi or wheezes.  Occasional cough, O2 sats 93% on room air.  No shortness of breath.  CV: regular rate and rhythm, normal S1 S2  PSYCH: mentation appears normal, affect normal/bright  LYMPH: no cervical, supraclavicular, axillary, or inguinal adenopathy    Results for orders placed or performed in visit on 01/25/24   Symptomatic Influenza A/B, RSV, & SARS-CoV2 PCR (COVID-19) Nose     Status: Abnormal    Specimen: Nose; Swab   Result Value Ref Range    Influenza A PCR Positive (A) Negative    Influenza B PCR Negative Negative    RSV PCR Negative Negative    SARS CoV2 PCR Negative Negative    Narrative    Testing was performed using the Xpert Xpress CoV2/Flu/RSV Assay on the [employer] GeneXpert Instrument. This test should be ordered for the detection of SARS-CoV-2, influenza, and RSV viruses in individuals who meet clinical and/or epidemiological criteria. Test performance is unknown in asymptomatic patients. This test is for in vitro diagnostic use under the FDA EUA for laboratories certified under CLIA to perform high or moderate complexity testing. This test has not been FDA cleared or approved. A negative result does not rule out the presence of PCR inhibitors in the specimen or target RNA in concentration below the limit of detection for the assay. If only one viral target is positive but coinfection with multiple targets is suspected, the sample should be re-tested with another FDA cleared, approved, or authorized test, if coinfection would change clinical management. This test was validated by the [facility]  [facility]atories. These laboratories are certified under the Clinical Laboratory Improvement Amendments of 1988 (CLIA-88) as qualified to perform high complexity laboratory testing.             Signed Electronically by: SHAUN Conner CNP

## 2024-01-25 NOTE — NURSING NOTE
Patient presents today for cough, fever, shortness of breath and chest congestion that started yesterday.    Medication Reconciliation Complete    hSelli Jones LPN  1/25/2024 9:21 AM

## 2024-04-28 DIAGNOSIS — F39 MOOD DISORDER (H): ICD-10-CM

## 2024-04-28 DIAGNOSIS — E78.2 MIXED HYPERLIPIDEMIA: Chronic | ICD-10-CM

## 2024-04-28 DIAGNOSIS — M15.0 PRIMARY OSTEOARTHRITIS INVOLVING MULTIPLE JOINTS: ICD-10-CM

## 2024-05-03 RX ORDER — CELECOXIB 100 MG/1
100 CAPSULE ORAL 2 TIMES DAILY
Qty: 180 CAPSULE | Refills: 0 | Status: SHIPPED | OUTPATIENT
Start: 2024-05-03 | End: 2024-07-19

## 2024-05-03 RX ORDER — ATORVASTATIN CALCIUM 40 MG/1
40 TABLET, FILM COATED ORAL DAILY
Qty: 90 TABLET | Refills: 0 | Status: SHIPPED | OUTPATIENT
Start: 2024-05-03 | End: 2024-07-19

## 2024-05-03 NOTE — TELEPHONE ENCOUNTER
[facility] sent Rx request for the following:      Requested Prescriptions   Pending Prescriptions Disp Refills    celecoxib (CELEBREX) 100 MG capsule [Pharmacy Med Name: CELECOXIB 100 MG Capsule] 180 capsule 3     Sig: TAKE 1 CAPSULE TWICE DAILY       NSAID Medications Failed - 5/3/2024 10:45 AM        Failed - Patient is age 6-64 years        Failed - Normal CBC on file in past 12 months     Recent Labs   Lab Test 12/23/23  1844 06/27/19  0941 06/22/17  0842   WBC 16.6*   < >  --    GICHWBC  --   --  5.7   RBC 4.73   < >  --    GICHRBC  --   --  4.57   HGB 15.1   < > 15.2   HCT 43.4   < > 43.4      < > 237    < > = values in this interval not displayed.                 Failed - Always Fail Criteria - Chart Review Required     Validate Diagnosis. If the medication is requested for an acute flare of a chronic pain associated with a musculoskeletal or rheumatologic condition; okay to authorize if all other criteria are met. If not, then forward to provider for review.       Last Prescription Date:   7/25/23  Last Fill Qty/Refills:         180, R-3            atorvastatin (LIPITOR) 40 MG tablet [Pharmacy Med Name: ATORVASTATIN CALCIUM 40 MG Tablet] 90 tablet 3     Sig: TAKE 1 TABLET EVERY DAY       Antihyperlipidemic agents Passed - 5/3/2024 10:45 AM          Last Prescription Date:   7/25/23  Last Fill Qty/Refills:         90, R-3          FLUoxetine (PROZAC) 20 MG capsule [Pharmacy Med Name: FLUOXETINE HYDROCHLORIDE 20 MG Capsule] 90 capsule 3     Sig: TAKE 1 CAPSULE EVERY MORNING       SSRIs Protocol Passed - 5/3/2024 10:45 AM          Last Prescription Date:   7/25/23  Last Fill Qty/Refills:         90, R-3    Last Office Visit:              1/25/24   Future Office visit:            none      Routing refill request to provider for review/approval because:  Drug not on the [facility] refill protocol       Letitia iLnda RN on 5/3/2024 at 10:49 AM

## 2024-05-03 NOTE — TELEPHONE ENCOUNTER
Please call to have her schedule her annual wellness with PCP-Julio. Due in July. SHAUN Conner CNP on 5/3/2024 at 11:28 AM

## 2024-05-04 ENCOUNTER — MYC MEDICAL ADVICE (OUTPATIENT)
Dept: PEDIATRICS | Facility: OTHER | Age: 84
End: 2024-05-04
Payer: COMMERCIAL

## 2024-05-06 DIAGNOSIS — E03.9 HYPOTHYROIDISM, UNSPECIFIED TYPE: ICD-10-CM

## 2024-05-08 RX ORDER — LEVOTHYROXINE SODIUM 125 UG/1
125 TABLET ORAL DAILY
Qty: 90 TABLET | Refills: 3 | OUTPATIENT
Start: 2024-05-08

## 2024-05-08 NOTE — TELEPHONE ENCOUNTER
[facility] sent Rx request for the following:      Redundant refill request refused: Too soon:  levothyroxine (SYNTHROID/LEVOTHROID) 125 MCG tablet 90 tablet 3 7/25/2023 -- No   Sig - Route: Take 1 tablet (125 mcg) by mouth daily - Oral   Sent to pharmacy as: Levothyroxine Sodium 125 MCG Oral Tablet (SYNTHROID/LEVOTHROID)   Class: E-Prescribe   Notes to Pharmacy: Dose adjustment   Order: [id number]   E-Prescribing Status: Receipt confirmed by pharmacy (7/25/2023 10:04 AM CDT)     Printout Tracking    External Result Report     Pharmacy    [facility] - [place], [place] - [address] RN .............. 5/8/2024  2:46 PM

## 2024-05-22 DIAGNOSIS — E03.9 HYPOTHYROIDISM, UNSPECIFIED TYPE: ICD-10-CM

## 2024-05-28 ENCOUNTER — MYC MEDICAL ADVICE (OUTPATIENT)
Dept: PEDIATRICS | Facility: OTHER | Age: 84
End: 2024-05-28
Payer: COMMERCIAL

## 2024-05-29 DIAGNOSIS — N39.41 URGE INCONTINENCE: ICD-10-CM

## 2024-05-30 RX ORDER — LEVOTHYROXINE SODIUM 125 UG/1
125 TABLET ORAL DAILY
Qty: 90 TABLET | Refills: 0 | Status: SHIPPED | OUTPATIENT
Start: 2024-05-30 | End: 2024-07-19

## 2024-05-30 RX ORDER — OXYBUTYNIN CHLORIDE 5 MG/1
5 TABLET ORAL DAILY
Qty: 90 TABLET | Refills: 0 | Status: SHIPPED | OUTPATIENT
Start: 2024-05-30 | End: 2024-07-19

## 2024-07-14 SDOH — HEALTH STABILITY: PHYSICAL HEALTH: ON AVERAGE, HOW MANY DAYS PER WEEK DO YOU ENGAGE IN MODERATE TO STRENUOUS EXERCISE (LIKE A BRISK WALK)?: 5 DAYS

## 2024-07-14 SDOH — HEALTH STABILITY: PHYSICAL HEALTH: ON AVERAGE, HOW MANY MINUTES DO YOU ENGAGE IN EXERCISE AT THIS LEVEL?: 30 MIN

## 2024-07-14 ASSESSMENT — SOCIAL DETERMINANTS OF HEALTH (SDOH): HOW OFTEN DO YOU GET TOGETHER WITH FRIENDS OR RELATIVES?: MORE THAN THREE TIMES A WEEK

## 2024-07-18 ASSESSMENT — PATIENT HEALTH QUESTIONNAIRE - PHQ9
SUM OF ALL RESPONSES TO PHQ QUESTIONS 1-9: 0
SUM OF ALL RESPONSES TO PHQ QUESTIONS 1-9: 0
10. IF YOU CHECKED OFF ANY PROBLEMS, HOW DIFFICULT HAVE THESE PROBLEMS MADE IT FOR YOU TO DO YOUR WORK, TAKE CARE OF THINGS AT HOME, OR GET ALONG WITH OTHER PEOPLE: NOT DIFFICULT AT ALL

## 2024-07-19 ENCOUNTER — OFFICE VISIT (OUTPATIENT)
Dept: PEDIATRICS | Facility: OTHER | Age: 84
End: 2024-07-19
Attending: INTERNAL MEDICINE
Payer: COMMERCIAL

## 2024-07-19 VITALS
DIASTOLIC BLOOD PRESSURE: 70 MMHG | WEIGHT: 170.5 LBS | HEART RATE: 54 BPM | TEMPERATURE: 97.3 F | RESPIRATION RATE: 16 BRPM | HEIGHT: 64 IN | BODY MASS INDEX: 29.11 KG/M2 | OXYGEN SATURATION: 95 % | SYSTOLIC BLOOD PRESSURE: 130 MMHG

## 2024-07-19 DIAGNOSIS — N39.41 URGE INCONTINENCE: ICD-10-CM

## 2024-07-19 DIAGNOSIS — F39 MOOD DISORDER (H): ICD-10-CM

## 2024-07-19 DIAGNOSIS — E87.6 DRUG-INDUCED HYPOKALEMIA: ICD-10-CM

## 2024-07-19 DIAGNOSIS — R05.3 CHRONIC COUGH: ICD-10-CM

## 2024-07-19 DIAGNOSIS — K73.9 CHRONIC HEPATITIS, UNSPECIFIED (H): ICD-10-CM

## 2024-07-19 DIAGNOSIS — K22.4 ESOPHAGEAL DYSMOTILITY: ICD-10-CM

## 2024-07-19 DIAGNOSIS — K80.51 CALCULUS OF BILE DUCT WITHOUT CHOLECYSTITIS WITH OBSTRUCTION: ICD-10-CM

## 2024-07-19 DIAGNOSIS — K83.8 DILATED BILE DUCT: ICD-10-CM

## 2024-07-19 DIAGNOSIS — N18.32 CHRONIC KIDNEY DISEASE, STAGE 3B (H): ICD-10-CM

## 2024-07-19 DIAGNOSIS — Z90.710 S/P TOTAL HYSTERECTOMY: ICD-10-CM

## 2024-07-19 DIAGNOSIS — R73.03 PREDIABETES: Chronic | ICD-10-CM

## 2024-07-19 DIAGNOSIS — K21.9 GASTROESOPHAGEAL REFLUX DISEASE, UNSPECIFIED WHETHER ESOPHAGITIS PRESENT: ICD-10-CM

## 2024-07-19 DIAGNOSIS — E78.2 MIXED HYPERLIPIDEMIA: Chronic | ICD-10-CM

## 2024-07-19 DIAGNOSIS — K44.9 HIATAL HERNIA: ICD-10-CM

## 2024-07-19 DIAGNOSIS — Z23 NEED FOR PROPHYLACTIC VACCINATION AGAINST HEPATITIS B VIRUS: ICD-10-CM

## 2024-07-19 DIAGNOSIS — I10 ESSENTIAL HYPERTENSION: ICD-10-CM

## 2024-07-19 DIAGNOSIS — Z12.31 ENCOUNTER FOR SCREENING MAMMOGRAM FOR BREAST CANCER: ICD-10-CM

## 2024-07-19 DIAGNOSIS — Z23 NEED FOR VACCINATION: ICD-10-CM

## 2024-07-19 DIAGNOSIS — Z98.890 S/P ERCP: ICD-10-CM

## 2024-07-19 DIAGNOSIS — M15.0 PRIMARY OSTEOARTHRITIS INVOLVING MULTIPLE JOINTS: ICD-10-CM

## 2024-07-19 DIAGNOSIS — T50.905A DRUG-INDUCED HYPOKALEMIA: ICD-10-CM

## 2024-07-19 DIAGNOSIS — Z23 NEED FOR PROPHYLACTIC VACCINATION AGAINST HEPATITIS A: ICD-10-CM

## 2024-07-19 DIAGNOSIS — E03.9 HYPOTHYROIDISM, UNSPECIFIED TYPE: ICD-10-CM

## 2024-07-19 DIAGNOSIS — Z00.00 ENCOUNTER FOR MEDICARE ANNUAL WELLNESS EXAM: Primary | ICD-10-CM

## 2024-07-19 DIAGNOSIS — R09.A2 GLOBUS SENSATION: ICD-10-CM

## 2024-07-19 DIAGNOSIS — R91.1 LUNG NODULE: ICD-10-CM

## 2024-07-19 DIAGNOSIS — C54.9 MALIGNANT NEOPLASM OF CORPUS UTERI, EXCEPT ISTHMUS (H): ICD-10-CM

## 2024-07-19 PROBLEM — E66.09 NON MORBID OBESITY DUE TO EXCESS CALORIES: Chronic | Status: RESOLVED | Noted: 2018-06-27 | Resolved: 2024-07-19

## 2024-07-19 LAB
ANION GAP SERPL CALCULATED.3IONS-SCNC: 8 MMOL/L (ref 7–15)
BUN SERPL-MCNC: 19.9 MG/DL (ref 8–23)
CALCIUM SERPL-MCNC: 8.8 MG/DL (ref 8.8–10.4)
CHLORIDE SERPL-SCNC: 100 MMOL/L (ref 98–107)
CHOLEST SERPL-MCNC: 189 MG/DL
CREAT SERPL-MCNC: 1.02 MG/DL (ref 0.51–0.95)
CREAT UR-MCNC: 103.6 MG/DL
EGFRCR SERPLBLD CKD-EPI 2021: 55 ML/MIN/1.73M2
FASTING STATUS PATIENT QL REPORTED: YES
FASTING STATUS PATIENT QL REPORTED: YES
GLUCOSE SERPL-MCNC: 108 MG/DL (ref 70–99)
HBA1C MFR BLD: 5.6 % (ref 4–6.2)
HCO3 SERPL-SCNC: 33 MMOL/L (ref 22–29)
HDLC SERPL-MCNC: 56 MG/DL
LDLC SERPL CALC-MCNC: 99 MG/DL
MICROALBUMIN UR-MCNC: <12 MG/L
MICROALBUMIN/CREAT UR: NORMAL MG/G{CREAT}
NONHDLC SERPL-MCNC: 133 MG/DL
PHOSPHATE SERPL-MCNC: 4.2 MG/DL (ref 2.5–4.5)
POTASSIUM SERPL-SCNC: 3.5 MMOL/L (ref 3.4–5.3)
PTH-INTACT SERPL-MCNC: 30 PG/ML (ref 15–65)
SODIUM SERPL-SCNC: 141 MMOL/L (ref 135–145)
TRIGL SERPL-MCNC: 172 MG/DL
TSH SERPL DL<=0.005 MIU/L-ACNC: 0.8 UIU/ML (ref 0.3–4.2)

## 2024-07-19 PROCEDURE — 99214 OFFICE O/P EST MOD 30 MIN: CPT | Mod: 25 | Performed by: INTERNAL MEDICINE

## 2024-07-19 PROCEDURE — 84443 ASSAY THYROID STIM HORMONE: CPT | Mod: ZL | Performed by: INTERNAL MEDICINE

## 2024-07-19 PROCEDURE — 82043 UR ALBUMIN QUANTITATIVE: CPT | Mod: ZL | Performed by: INTERNAL MEDICINE

## 2024-07-19 PROCEDURE — 80048 BASIC METABOLIC PNL TOTAL CA: CPT | Mod: ZL | Performed by: INTERNAL MEDICINE

## 2024-07-19 PROCEDURE — G0463 HOSPITAL OUTPT CLINIC VISIT: HCPCS

## 2024-07-19 PROCEDURE — G0463 HOSPITAL OUTPT CLINIC VISIT: HCPCS | Mod: 25

## 2024-07-19 PROCEDURE — 84100 ASSAY OF PHOSPHORUS: CPT | Mod: ZL | Performed by: INTERNAL MEDICINE

## 2024-07-19 PROCEDURE — 80061 LIPID PANEL: CPT | Mod: ZL | Performed by: INTERNAL MEDICINE

## 2024-07-19 PROCEDURE — 36415 COLL VENOUS BLD VENIPUNCTURE: CPT | Mod: ZL | Performed by: INTERNAL MEDICINE

## 2024-07-19 PROCEDURE — 90480 ADMN SARSCOV2 VAC 1/ONLY CMP: CPT

## 2024-07-19 PROCEDURE — 83970 ASSAY OF PARATHORMONE: CPT | Mod: ZL | Performed by: INTERNAL MEDICINE

## 2024-07-19 PROCEDURE — 83036 HEMOGLOBIN GLYCOSYLATED A1C: CPT | Mod: ZL | Performed by: INTERNAL MEDICINE

## 2024-07-19 PROCEDURE — G0439 PPPS, SUBSEQ VISIT: HCPCS | Performed by: INTERNAL MEDICINE

## 2024-07-19 RX ORDER — SUCRALFATE 1 G/1
1 TABLET ORAL
Qty: 270 TABLET | Refills: 4 | Status: SHIPPED | OUTPATIENT
Start: 2024-07-19

## 2024-07-19 RX ORDER — POTASSIUM CHLORIDE 750 MG/1
20 TABLET, EXTENDED RELEASE ORAL DAILY
Qty: 180 TABLET | Refills: 4 | Status: SHIPPED | OUTPATIENT
Start: 2024-07-19

## 2024-07-19 RX ORDER — OXYBUTYNIN CHLORIDE 5 MG/1
5 TABLET ORAL DAILY
Qty: 90 TABLET | Refills: 4 | Status: SHIPPED | OUTPATIENT
Start: 2024-07-19

## 2024-07-19 RX ORDER — METOPROLOL TARTRATE 50 MG
50 TABLET ORAL 2 TIMES DAILY
Qty: 180 TABLET | Refills: 4 | Status: SHIPPED | OUTPATIENT
Start: 2024-07-19 | End: 2024-09-10

## 2024-07-19 RX ORDER — ATORVASTATIN CALCIUM 40 MG/1
40 TABLET, FILM COATED ORAL DAILY
Qty: 90 TABLET | Refills: 4 | Status: SHIPPED | OUTPATIENT
Start: 2024-07-19

## 2024-07-19 RX ORDER — LEVOTHYROXINE SODIUM 125 UG/1
125 TABLET ORAL DAILY
Qty: 90 TABLET | Refills: 4 | Status: SHIPPED | OUTPATIENT
Start: 2024-07-19

## 2024-07-19 RX ORDER — CELECOXIB 100 MG/1
100 CAPSULE ORAL 2 TIMES DAILY
Qty: 180 CAPSULE | Refills: 4 | Status: SHIPPED | OUTPATIENT
Start: 2024-07-19

## 2024-07-19 ASSESSMENT — PAIN SCALES - GENERAL: PAINLEVEL: NO PAIN (0)

## 2024-07-19 NOTE — PATIENT INSTRUCTIONS
Patient Education   Preventive Care Advice   This is general advice given by our system to help you stay healthy. However, your care team may have specific advice just for you. Please talk to your care team about your preventive care needs.  Nutrition  Eat 5 or more servings of fruits and vegetables each day.  Try wheat bread, brown rice and whole grain pasta (instead of white bread, rice, and pasta).  Get enough calcium and vitamin D. Check the label on foods and aim for 100% of the RDA (recommended daily allowance).  Lifestyle  Exercise at least 150 minutes each week  (30 minutes a day, 5 days a week).  Do muscle strengthening activities 2 days a week. These help control your weight and prevent disease.  No smoking.  Wear sunscreen to prevent skin cancer.  Have a dental exam and cleaning every 6 months.  Yearly exams  See your health care team every year to talk about:  Any changes in your health.  Any medicines your care team has prescribed.  Preventive care, family planning, and ways to prevent chronic diseases.  Shots (vaccines)   HPV shots (up to age 26), if you've never had them before.  Hepatitis B shots (up to age 59), if you've never had them before.  COVID-19 shot: Get this shot when it's due.  Flu shot: Get a flu shot every year.  Tetanus shot: Get a tetanus shot every 10 years.  Pneumococcal, hepatitis A, and RSV shots: Ask your care team if you need these based on your risk.  Shingles shot (for age 50 and up)  General health tests  Diabetes screening:  Starting at age 35, Get screened for diabetes at least every 3 years.  If you are younger than age 35, ask your care team if you should be screened for diabetes.  Cholesterol test: At age 39, start having a cholesterol test every 5 years, or more often if advised.  Bone density scan (DEXA): At age 50, ask your care team if you should have this scan for osteoporosis (brittle bones).  Hepatitis C: Get tested at least once in your life.  STIs (sexually  transmitted infections)  Before age 24: Ask your care team if you should be screened for STIs.  After age 24: Get screened for STIs if you're at risk. You are at risk for STIs (including HIV) if:  You are sexually active with more than one person.  You don't use condoms every time.  You or a partner was diagnosed with a sexually transmitted infection.  If you are at risk for HIV, ask about PrEP medicine to prevent HIV.  Get tested for HIV at least once in your life, whether you are at risk for HIV or not.  Cancer screening tests  Cervical cancer screening: If you have a cervix, begin getting regular cervical cancer screening tests starting at age 21.  Breast cancer scan (mammogram): If you've ever had breasts, begin having regular mammograms starting at age 40. This is a scan to check for breast cancer.  Colon cancer screening: It is important to start screening for colon cancer at age 45.  Have a colonoscopy test every 10 years (or more often if you're at risk) Or, ask your provider about stool tests like a FIT test every year or Cologuard test every 3 years.  To learn more about your testing options, visit:   .  For help making a decision, visit:   https://bit.ly/sq78743.  Prostate cancer screening test: If you have a prostate, ask your care team if a prostate cancer screening test (PSA) at age 55 is right for you.  Lung cancer screening: If you are a current or former smoker ages 50 to 80, ask your care team if ongoing lung cancer screenings are right for you.  For informational purposes only. Not to replace the advice of your health care provider. Copyright   2023 [facility] [employer]. All rights reserved. Clinically reviewed by the [facility] Transitions Program. [employer] [id number] - REV 01/24.  Bladder Training: Care Instructions  Your Care Instructions     Bladder training is used to treat urge incontinence and stress incontinence. Urge incontinence means that the need to urinate comes on so fast  that you can't get to a toilet in time. Stress incontinence means that you leak urine because of pressure on your bladder. For example, it may happen when you laugh, cough, or lift something heavy.  Bladder training can increase how long you can wait before you have to urinate. It can also help your bladder hold more urine. And it can give you better control over the urge to urinate.  It is important to remember that bladder training takes a few weeks to a few months to make a difference. You may not see results right away, but don't give up.  Follow-up care is a key part of your treatment and safety. Be sure to make and go to all appointments, and call your doctor if you are having problems. It's also a good idea to know your test results and keep a list of the medicines you take.  How can you care for yourself at home?  Work with your doctor to come up with a bladder training program that is right for you. You may use one or more of the following methods.  Delayed urination  In the beginning, try to keep from urinating for 5 minutes after you first feel the need to go.  While you wait, take deep, slow breaths to relax. Kegel exercises can also help you delay the need to go to the bathroom.  After some practice, when you can easily wait 5 minutes to urinate, try to wait 10 minutes before you urinate.  Slowly increase the waiting period until you are able to control when you have to urinate.  Scheduled urination  Empty your bladder when you first wake up in the morning.  Schedule times throughout the day when you will urinate.  Start by going to the bathroom every hour, even if you don't need to go.  Slowly increase the time between trips to the bathroom.  When you have found a schedule that works well for you, keep doing it.  If you wake up during the night and have to urinate, do it. Apply your schedule to waking hours only.  Kegel exercises  These tighten and strengthen pelvic muscles, which can help you control  "the flow of urine. (If doing these exercises causes pain, stop doing them and talk with your doctor.) To do Kegel exercises:  Squeeze your muscles as if you were trying not to pass gas. Or squeeze your muscles as if you were stopping the flow of urine. Your belly, legs, and buttocks shouldn't move.  Hold the squeeze for 3 seconds, then relax for 5 to 10 seconds.  Start with 3 seconds, then add 1 second each week until you are able to squeeze for 10 seconds.  Repeat the exercise 10 times a session. Do 3 to 8 sessions a day.  When should you call for help?  Watch closely for changes in your health, and be sure to contact your doctor if:    Your incontinence is getting worse.     You do not get better as expected.   Where can you learn more?  Go to https://www.[employer].net/patiented  Enter V684 in the search box to learn more about \"Bladder Training: Care Instructions.\"  Current as of: November 15, 2023               Content Version: 14.0    2520-9797 [employer].   Care instructions adapted under license by your healthcare professional. If you have questions about a medical condition or this instruction, always ask your healthcare professional. [employer] disclaims any warranty or liability for your use of this information.         "

## 2024-07-19 NOTE — NURSING NOTE
"Chief Complaint   Patient presents with    Medicare Visit    Recheck Medication       Initial /70   Pulse 54   Temp 97.3  F (36.3  C) (Tympanic)   Resp 16   Ht 1.619 m (5' 3.75\")   Wt 77.3 kg (170 lb 8 oz)   LMP 01/21/1997 (Approximate)   SpO2 95%   Breastfeeding No   BMI 29.50 kg/m   Estimated body mass index is 29.5 kg/m  as calculated from the following:    Height as of this encounter: 1.619 m (5' 3.75\").    Weight as of this encounter: 77.3 kg (170 lb 8 oz).  Medication Review: complete    The next two questions are to help us understand your food security.  If you are feeling you need any assistance in this area, we have resources available to support you today.          7/14/2024   SDOH- Food Insecurity   Within the past 12 months, did you worry that your food would run out before you got money to buy more? N   Within the past 12 months, did the food you bought just not last and you didn t have money to get more? N            Health Care Directive:  Patient does not have a Health Care Directive or Living Will: Patient states has Advance Directive and will bring in a copy to clinic.    Norma J. Gosselin, LPN      "

## 2024-07-19 NOTE — PROGRESS NOTES
Preventive Care Visit  [facility]  Siddharth Kim MD, Internal Medicine  Jul 19, 2024      1. Encounter for Medicare annual wellness exam  Colon cancer screening not recommended due to age.    2. Mixed hyperlipidemia  Continue statin due for lipid  - atorvastatin (LIPITOR) 40 MG tablet; Take 1 tablet (40 mg) by mouth daily  Dispense: 90 tablet; Refill: 4  - Lipid Profile; Future  - Lipid Profile    3. Primary osteoarthritis involving multiple joints  At goal, no change.  - celecoxib (CELEBREX) 100 MG capsule; Take 1 capsule (100 mg) by mouth 2 times daily  Dispense: 180 capsule; Refill: 4    4. Mood disorder (H24)  At goal, no change.  - FLUoxetine (PROZAC) 20 MG capsule; TAKE 1 CAPSULE EVERY MORNING  Dispense: 90 capsule; Refill: 4    5. Hypothyroidism, unspecified type  Continue levothyroxine due for TSH  - levothyroxine (SYNTHROID/LEVOTHROID) 125 MCG tablet; Take 1 tablet (125 mcg) by mouth daily  Dispense: 90 tablet; Refill: 4  - TSH with free T4 reflex; Future  - TSH with free T4 reflex    6. Essential hypertension  At goal, no change.  - metoprolol tartrate (LOPRESSOR) 50 MG tablet; Take 1 tablet (50 mg) by mouth 2 times daily  Dispense: 180 tablet; Refill: 4    7. Gastroesophageal reflux disease, unspecified whether esophagitis present  8. Esophageal dysmotility  9. Hiatal hernia  At goal, no change.  - omeprazole (PRILOSEC) 20 MG DR capsule; Take 1 capsule (20 mg) by mouth daily  Dispense: 90 capsule; Refill: 4  - sucralfate (CARAFATE) 1 GM tablet; Take 1 tablet (1 g) by mouth 3 times daily (before meals)  Dispense: 270 tablet; Refill: 4    10. Urge incontinence  At goal, no change.  - oxyBUTYnin (DITROPAN) 5 MG tablet; Take 1 tablet (5 mg) by mouth daily  Dispense: 90 tablet; Refill: 4    11. Drug-induced hypokalemia  At goal, no change.  - potassium chloride chela ER (KLOR-CON M10) 10 MEQ CR tablet; Take 2 tablets (20 mEq) by mouth daily  Dispense: 180 tablet; Refill: 4    12.  Malignant neoplasm of corpus uteri, except isthmus (H)  13. S/P total hysterectomy  She had a hysterectomy    14. Chronic hepatitis, unspecified (H)  She had inflammation of her liver secondary to gallstones    15. Prediabetes  Recommend weight loss  - Hemoglobin A1c; Future  - Basic metabolic panel; Future  - Hemoglobin A1c  - Basic metabolic panel    16. Chronic cough  With regards to her chronic cough I think the most likely etiology is gastroesophageal reflux disease however differential diagnosis also includes allergic rhinosinusitis, others.  I discussed modalities to reduce refluxing including moving the largest meal of the day earlier in the day, elevating head of bed, not laying down after eating, etc.  We discussed options and decided on ENT consultation.  - Adult ENT  Referral; Future    17. Lung nodule  Previous nodule was benign appearing    18. Globus sensation  - Adult ENT  Referral; Future    19. Dilated bile duct  20. Calculus of bile duct without cholecystitis with obstruction  21. S/P ERCP  Sees GI    22. Need for prophylactic vaccination against hepatitis A  23. Need for prophylactic vaccination against hepatitis B virus  24. Need for vaccination  - hepatitis A vaccine (VAQTA) 50 UNIT/ML injection; Inject 1 mL (50 Units) into the muscle once for 1 dose  Dispense: 1 mL; Refill: 0  - hepatitis b vaccine recombinant (RECOMBIVAX-HB) 10 MCG/ML injection; Inject 0.5 mLs (5 mcg) into the muscle once for 1 dose  Dispense: 1 mL; Refill: 0    25. Chronic kidney disease, stage 3b (H)  - Albumin Random Urine Quantitative with Creat Ratio; Future  - Parathyroid Hormone Intact; Future  - Phosphorus; Future  - Albumin Random Urine Quantitative with Creat Ratio  - Parathyroid Hormone Intact  - Phosphorus    26. Encounter for screening mammogram for breast cancer  - MA Screen Bilateral w/Jacek; Future          Signed, Siddharth Kim MD, FAAP, FACP  Internal Medicine & Pediatrics      Subjective    Madiha is a 84 year old, presenting for the following:  Medicare Visit and Recheck Medication  She is also been having a chronic cough with a sensation of fullness at the bottom of her throat.  She does have some postnasal drainage.          Health Care Directive  Patient does not have a Health Care Directive or Living Will: Patient states has Advance Directive and will bring in a copy to clinic.    HPI              7/14/2024   General Health   How would you rate your overall physical health? Good   Feel stress (tense, anxious, or unable to sleep) Only a little      (!) STRESS CONCERN      7/14/2024   Nutrition   Diet: Regular (no restrictions)            7/14/2024   Exercise   Days per week of moderate/strenous exercise 5 days   Average minutes spent exercising at this level 30 min            7/14/2024   Social Factors   Frequency of gathering with friends or relatives More than three times a week   Worry food won't last until get money to buy more No   Food not last or not have enough money for food? No   Do you have housing? (Housing is defined as stable permanent housing and does not include staying ouside in a car, in a tent, in an abandoned building, in an overnight shelter, or couch-surfing.) Yes   Are you worried about losing your housing? No   Lack of transportation? No   Unable to get utilities (heat,electricity)? No            7/14/2024   Fall Risk   Fallen 2 or more times in the past year? No   Trouble with walking or balance? No             7/14/2024   Activities of Daily Living- Home Safety   Needs help with the following daily activites None of the above   Safety concerns in the home None of the above            7/14/2024   Dental   Dentist two times every year? Yes            7/14/2024   Hearing Screening   Hearing concerns? None of the above            7/14/2024   Driving Risk Screening   Patient/family members have concerns about driving No            7/14/2024   General Alertness/Fatigue  Screening   Have you been more tired than usual lately? No            7/14/2024   Urinary Incontinence Screening   Bothered by leaking urine in past 6 months Yes            7/14/2024   TB Screening   Were you born outside of the [place]? No          Today's PHQ-9 Score:       7/18/2024    10:11 AM   PHQ-9 SCORE   PHQ-9 Total Score MyChart 0   PHQ-9 Total Score 0         7/14/2024   Substance Use   Alcohol more than 3/day or more than 7/wk No   Do you have a current opioid prescription? No   How severe/bad is pain from 1 to 10? 0/10 (No Pain)   Do you use any other substances recreationally? No        Social History     Tobacco Use    Smoking status: Never    Smokeless tobacco: Never   Vaping Use    Vaping status: Never Used   Substance Use Topics    Alcohol use: No    Drug use: Never           10/27/2021   LAST FHS-7 RESULTS   1st degree relative breast or ovarian cancer No   Any relative bilateral breast cancer No   Any male have breast cancer No   Any ONE woman have BOTH breast AND ovarian cancer No   Any woman with breast cancer before 50yrs No   2 or more relatives with breast AND/OR ovarian cancer No   2 or more relatives with breast AND/OR bowel cancer No                         Reviewed and updated as needed this visit by Provider                      Current providers sharing in care for this patient include:  Patient Care Team:  Siddharth Kim MD as PCP - General (Internal Medicine)  Caity Calderon APRN CNP as Assigned PCP    The following health maintenance items are reviewed in Epic and correct as of today:  Health Maintenance   Topic Date Due    MICROALBUMIN  Never done    PARATHYROID  Never done    PHOSPHORUS  Never done    HEPATITIS A IMMUNIZATION (1 of 2 - Risk 2-dose series) Never done    HEPATITIS B IMMUNIZATION (1 of 3 - Risk 3-dose series) Never done    COVID-19 Vaccine (7 - 2023-24 season) 02/06/2024    MEDICARE ANNUAL WELLNESS VISIT  07/25/2024    LIPID  07/25/2024    INFLUENZA VACCINE  "(1) 09/01/2024    TSH W/FREE T4 REFLEX  10/06/2024    HEMOGLOBIN  12/23/2024    PHQ-9  01/19/2025    BMP  01/24/2025    FALL RISK ASSESSMENT  07/19/2025    ADVANCE CARE PLANNING  07/19/2029    DTAP/TDAP/TD IMMUNIZATION (4 - Td or Tdap) 07/15/2032    DEXA  09/01/2032    DEPRESSION ACTION PLAN  Completed    Pneumococcal Vaccine: 65+ Years  Completed    URINALYSIS  Completed    ALK PHOS  Completed    ZOSTER IMMUNIZATION  Completed    RSV VACCINE (Pregnancy & 60+)  Completed    IPV IMMUNIZATION  Aged Out    HPV IMMUNIZATION  Aged Out    MENINGITIS IMMUNIZATION  Aged Out    RSV MONOCLONAL ANTIBODY  Aged Out            Objective    Exam  /70   Pulse 54   Temp 97.3  F (36.3  C) (Tympanic)   Resp 16   Ht 1.619 m (5' 3.75\")   Wt 77.3 kg (170 lb 8 oz)   LMP 01/21/1997 (Approximate)   SpO2 95%   Breastfeeding No   BMI 29.50 kg/m     Estimated body mass index is 29.5 kg/m  as calculated from the following:    Height as of this encounter: 1.619 m (5' 3.75\").    Weight as of this encounter: 77.3 kg (170 lb 8 oz).    Physical Exam  Gen: Alert, NAD.  Neck: No carotid bruits  CV: RRR no m/r/g  Pulm: CTAB, no w/r/r. No increased work of breathing  Msk: No LE edema  Neuro: Grossly intact  Skin: No concerning lesions.  Psychiatric: Normal affect and insight. Does not appear anxious or depressed.          7/19/2024   Mini Cog   Clock Draw Score 2 Normal   3 Item Recall 3 objects recalled   Mini Cog Total Score 5                 Signed Electronically by: Siddharth Kim MD    Answers submitted by the patient for this visit:  Patient Health Questionnaire (Submitted on 7/18/2024)  If you checked off any problems, how difficult have these problems made it for you to do your work, take care of things at home, or get along with other people?: Not difficult at all  PHQ9 TOTAL SCORE: 0    "

## 2024-08-01 ENCOUNTER — DOCUMENTATION ONLY (OUTPATIENT)
Dept: OTHER | Facility: CLINIC | Age: 84
End: 2024-08-01
Payer: COMMERCIAL

## 2024-08-06 ENCOUNTER — TELEPHONE (OUTPATIENT)
Dept: PEDIATRICS | Facility: OTHER | Age: 84
End: 2024-08-06
Payer: COMMERCIAL

## 2024-08-06 DIAGNOSIS — I10 ESSENTIAL HYPERTENSION: Chronic | ICD-10-CM

## 2024-08-06 RX ORDER — HYDROCHLOROTHIAZIDE 25 MG/1
25 TABLET ORAL DAILY
Qty: 90 TABLET | Refills: 3 | Status: CANCELLED | OUTPATIENT
Start: 2024-08-06

## 2024-08-06 NOTE — TELEPHONE ENCOUNTER
Pt states that [facility] did not put down the prescription Hydrochlorothiazide.  They do to [facility]. She also would like to discuss Hep A and B.      Adrian Cordero on 8/6/2024 at 1:08 PM

## 2024-08-06 NOTE — TELEPHONE ENCOUNTER
Patient contacted and informed of provider response. Patient will check her blood pressures daily this week 1-2 times per day and call or send [employer] message with readings so Dr. Kim can review them when he returns. If she needs to re-start hydrochlorothiazide she will need a refill sent.     Donna Xavier CMA on 8/6/2024 at 2:22 PM

## 2024-08-06 NOTE — TELEPHONE ENCOUNTER
She was taking Hydrochlorothiazide at discharge from [facility] in December and continued taking it in January 1/9/24 when Caity Calderon said she would remain off of it because she didn't know otherwise. She is calling for a refill because she is now out. Should she be taking it or not? Please advise.     Medication teed up if appropriate.

## 2024-08-06 NOTE — TELEPHONE ENCOUNTER
On January 2 she was seen for hospital follow-up.  Blood pressure was low so hydrochlorothiazide was held at that time.  She was seen again in office on January 9.Blood pressures had stabilized but still on the low end.  At that time I recommended continuing with metoprolol but staying off of hydrochlorothiazide. The last time I saw her was January 25, 2024 for influenza A.  We did not restart hydrochlorothiazide at that time.  She was last seen by primary care provider for wellness exam on 7/18/2024, medication list did not include hydrochlorothiazide at that time.  I would recommend this be evaluated with her primary care provider to determine if she should restart it as I have not seen her since January. SHAUN Conner CNP on 8/6/2024 at 1:42 PM

## 2024-08-21 ENCOUNTER — MYC MEDICAL ADVICE (OUTPATIENT)
Dept: PEDIATRICS | Facility: OTHER | Age: 84
End: 2024-08-21
Payer: COMMERCIAL

## 2024-08-21 NOTE — TELEPHONE ENCOUNTER
Can we get her set up with an appointment? Does she still have hydrochlorothiazide at home- could she take 1/2 tablet 12.5 mg until she follows up?

## 2024-08-21 NOTE — TELEPHONE ENCOUNTER
Called patient to offer them work-in with [facility] on Thursday or Friday.  She can make this Friday.      Called [facility] to schedule.    Followed up on MyChart.     Rain Thomas RN on 8/21/2024 at 3:27 PM

## 2024-08-21 NOTE — TELEPHONE ENCOUNTER
See Phone message from 8/6/24 and Verified [employer] message from 8/9/24.    Lalito Rey RN on 8/21/2024 at 1:13 PM

## 2024-08-23 ENCOUNTER — OFFICE VISIT (OUTPATIENT)
Dept: INTERNAL MEDICINE | Facility: OTHER | Age: 84
End: 2024-08-23
Attending: STUDENT IN AN ORGANIZED HEALTH CARE EDUCATION/TRAINING PROGRAM
Payer: COMMERCIAL

## 2024-08-23 VITALS
OXYGEN SATURATION: 93 % | SYSTOLIC BLOOD PRESSURE: 184 MMHG | WEIGHT: 174.4 LBS | RESPIRATION RATE: 16 BRPM | DIASTOLIC BLOOD PRESSURE: 72 MMHG | TEMPERATURE: 97.1 F | HEIGHT: 64 IN | HEART RATE: 51 BPM | BODY MASS INDEX: 29.78 KG/M2

## 2024-08-23 DIAGNOSIS — N18.31 CKD STAGE 3A, GFR 45-59 ML/MIN (H): ICD-10-CM

## 2024-08-23 DIAGNOSIS — M25.473 ANKLE SWELLING, UNSPECIFIED LATERALITY: ICD-10-CM

## 2024-08-23 DIAGNOSIS — I10 BENIGN ESSENTIAL HYPERTENSION: Primary | ICD-10-CM

## 2024-08-23 PROCEDURE — G0463 HOSPITAL OUTPT CLINIC VISIT: HCPCS

## 2024-08-23 PROCEDURE — 99214 OFFICE O/P EST MOD 30 MIN: CPT | Performed by: STUDENT IN AN ORGANIZED HEALTH CARE EDUCATION/TRAINING PROGRAM

## 2024-08-23 PROCEDURE — G2211 COMPLEX E/M VISIT ADD ON: HCPCS | Performed by: STUDENT IN AN ORGANIZED HEALTH CARE EDUCATION/TRAINING PROGRAM

## 2024-08-23 RX ORDER — HYDROCHLOROTHIAZIDE 25 MG/1
25 TABLET ORAL DAILY
Qty: 90 TABLET | Refills: 4 | Status: SHIPPED | OUTPATIENT
Start: 2024-08-23

## 2024-08-23 ASSESSMENT — PAIN SCALES - GENERAL: PAINLEVEL: NO PAIN (0)

## 2024-08-23 NOTE — PROGRESS NOTES
Assessment & Plan         ICD-10-CM    1. Benign essential hypertension  I10 hydrochlorothiazide (HYDRODIURIL) 25 MG tablet      2. Ankle swelling, unspecified laterality  M25.473       3. CKD stage 3a, GFR 45-59 ml/min (H)  N18.31         Hypertension: Has had some worsening swelling and her log from home over the last few weeks has shown increased blood pressures.  Will increase her hydrochlorothiazide to 25 mg daily, continue metoprolol 50 mg twice daily, could consider switching it out for losartan in the future given her CKD stage IIIa to help with her hypokalemia and she does not have a hard indicator for a beta-blocker, but defer this to PCP.    Follow-up with PCP or myself in 2 to 4 weeks for blood pressure recheck and repeat BMP at that time.    The longitudinal plan of care for the diagnosis(es)/condition(s) as documented were addressed during this visit. Due to the added complexity in care, I will continue to support Madiha in the subsequent management and with ongoing continuity of care.      No follow-ups on file.    Jasiel Riley MD  [facility]      Subjective   Madiah is a 84 year old, presenting for the following health issues:  Recheck Medication and Hypertension      8/23/2024     8:27 AM   Additional Questions   Roomed by Diego Williamson LPN     History of Present Illness       Hypertension: She presents for follow up of hypertension.  She does check blood pressure  regularly outside of the clinic. Outside blood pressures have been over 140/90. She follows a low salt diet. She consumes 0 sweetened beverage(s) daily.She exercises with enough effort to increase her heart rate 30 to 60 minutes per day.  She exercises with enough effort to increase her heart rate 5 days per week.   She is taking medications regularly.     December went to [place] due to cholangitis.   She was told to hold hydrochlorothiazide  Reduced metoprolol to 50mg daily.     Restarted hydrochlorothiazide  "12.5mg daily   Ankles were swollen before she started.   Gained a few pounds as well.   This has resolved with increasing hydrochlorothiazide.           Objective    BP (!) 176/88   Pulse 51   Temp 97.1  F (36.2  C) (Temporal)   Resp 16   Ht 1.619 m (5' 3.75\")   Wt 79.1 kg (174 lb 6.4 oz)   LMP 01/21/1997 (Approximate)   SpO2 93%   Breastfeeding No   BMI 30.17 kg/m    Body mass index is 30.17 kg/m .  Physical Exam   General: Pleasant 89-year-old woman sitting in clinic no acute distress  CV: Regular rate and rhythm, mild nonpitting edema to the knee bilaterally          Signed Electronically by: Jasiel Riley MD    "

## 2024-08-23 NOTE — NURSING NOTE
"Chief Complaint   Patient presents with    Recheck Medication    Hypertension       Initial BP (!) 176/88   Pulse 51   Temp 97.1  F (36.2  C) (Temporal)   Resp 16   Ht 1.619 m (5' 3.75\")   Wt 79.1 kg (174 lb 6.4 oz)   LMP 01/21/1997 (Approximate)   SpO2 93%   Breastfeeding No   BMI 30.17 kg/m   Estimated body mass index is 30.17 kg/m  as calculated from the following:    Height as of this encounter: 1.619 m (5' 3.75\").    Weight as of this encounter: 79.1 kg (174 lb 6.4 oz).  Medication Review: complete    The next two questions are to help us understand your food security.  If you are feeling you need any assistance in this area, we have resources available to support you today.          7/14/2024   SDOH- Food Insecurity   Within the past 12 months, did you worry that your food would run out before you got money to buy more? N   Within the past 12 months, did the food you bought just not last and you didn t have money to get more? N            Health Care Directive:  Patient does not have a Health Care Directive or Living Will: Patient states has Advance Directive and will bring in a copy to clinic.    Carol Williamson LPN      "

## 2024-09-10 ENCOUNTER — OFFICE VISIT (OUTPATIENT)
Dept: INTERNAL MEDICINE | Facility: OTHER | Age: 84
End: 2024-09-10
Attending: STUDENT IN AN ORGANIZED HEALTH CARE EDUCATION/TRAINING PROGRAM
Payer: COMMERCIAL

## 2024-09-10 VITALS
HEIGHT: 64 IN | OXYGEN SATURATION: 95 % | DIASTOLIC BLOOD PRESSURE: 70 MMHG | HEART RATE: 62 BPM | TEMPERATURE: 97.2 F | BODY MASS INDEX: 29.4 KG/M2 | RESPIRATION RATE: 16 BRPM | WEIGHT: 172.2 LBS | SYSTOLIC BLOOD PRESSURE: 134 MMHG

## 2024-09-10 DIAGNOSIS — I10 BENIGN ESSENTIAL HYPERTENSION: Primary | ICD-10-CM

## 2024-09-10 DIAGNOSIS — N18.31 CKD STAGE 3A, GFR 45-59 ML/MIN (H): ICD-10-CM

## 2024-09-10 DIAGNOSIS — M79.89 LEG SWELLING: ICD-10-CM

## 2024-09-10 PROCEDURE — 99214 OFFICE O/P EST MOD 30 MIN: CPT | Performed by: STUDENT IN AN ORGANIZED HEALTH CARE EDUCATION/TRAINING PROGRAM

## 2024-09-10 PROCEDURE — G0463 HOSPITAL OUTPT CLINIC VISIT: HCPCS

## 2024-09-10 PROCEDURE — G2211 COMPLEX E/M VISIT ADD ON: HCPCS | Performed by: STUDENT IN AN ORGANIZED HEALTH CARE EDUCATION/TRAINING PROGRAM

## 2024-09-10 RX ORDER — LOSARTAN POTASSIUM 25 MG/1
25 TABLET ORAL DAILY
Qty: 90 TABLET | Refills: 4 | Status: SHIPPED | OUTPATIENT
Start: 2024-09-10

## 2024-09-10 ASSESSMENT — PAIN SCALES - GENERAL: PAINLEVEL: NO PAIN (0)

## 2024-09-10 NOTE — PROGRESS NOTES
Assessment & Plan         ICD-10-CM    1. Benign essential hypertension  I10 losartan (COZAAR) 25 MG tablet      2. Leg swelling  M79.89       3. CKD stage 3a, GFR 45-59 ml/min (H)  N18.31 losartan (COZAAR) 25 MG tablet        Attention: Blood pressure log from home still shows readings in the 130s to 140s.  Her peripheral edema has improved with increasing the dose of her hydrochlorothiazide but given that she has CKD stage IIIa we will convert her metoprolol to losartan 25 mg and recheck labs at follow-up visit in 2 to 4 weeks with myself or PCP.    The longitudinal plan of care for the diagnosis(es)/condition(s) as documented were addressed during this visit. Due to the added complexity in care, I will continue to support Madiha in the subsequent management and with ongoing continuity of care.          No follow-ups on file.    Jasiel Riley MD  [facility]      Subjective   Madiha is a 84 year old, presenting for the following health issues:  Hypertension      9/10/2024     8:44 AM   Additional Questions   Roomed by Diego Williamson LPN     History of Present Illness       Hypertension: She presents for follow up of hypertension.  She does check blood pressure  regularly outside of the clinic. Outpatient blood pressures have not been over 140/90. She follows a low salt diet.     She eats 2-3 servings of fruits and vegetables daily.She consumes 0 sweetened beverage(s) daily.She exercises with enough effort to increase her heart rate 20 to 29 minutes per day.  She exercises with enough effort to increase her heart rate 5 days per week.   She is taking medications regularly.     She is tolerating the increased dose of her hydrochlorothiazide well.  Her peripheral edema has improved.  Her weight has decreased 3 pounds and is back to normal.  She has no shortness of breath.  No chest pain.  She thinks that her blood pressure can be a little lower.  Blood pressure log from home is in the 120s to  "140s and even a rare 150 point reading.  She states she was also reading on her MyChart that she has chronic kidney disease stage IIIa and  was wondering what this meant.          Objective    BP (!) 150/78   Pulse 62   Temp 97.2  F (36.2  C) (Temporal)   Resp 16   Ht 1.619 m (5' 3.75\")   Wt 78.1 kg (172 lb 3.2 oz)   LMP 01/21/1997 (Approximate)   SpO2 95%   Breastfeeding No   BMI 29.79 kg/m    Body mass index is 29.79 kg/m .  Physical Exam   General: Pleasant 84-year-old woman sitting clinic no acute distress  CV: Regular rate and rhythm, no significant peripheral edema appreciated          Signed Electronically by: Jasiel Riley MD    "

## 2024-09-10 NOTE — NURSING NOTE
"Chief Complaint   Patient presents with    Hypertension       Initial BP (!) 150/78   Pulse 62   Temp 97.2  F (36.2  C) (Temporal)   Resp 16   Ht 1.619 m (5' 3.75\")   Wt 78.1 kg (172 lb 3.2 oz)   LMP 01/21/1997 (Approximate)   SpO2 95%   Breastfeeding No   BMI 29.79 kg/m   Estimated body mass index is 29.79 kg/m  as calculated from the following:    Height as of this encounter: 1.619 m (5' 3.75\").    Weight as of this encounter: 78.1 kg (172 lb 3.2 oz).  Medication Review: complete    The next two questions are to help us understand your food security.  If you are feeling you need any assistance in this area, we have resources available to support you today.          7/14/2024   SDOH- Food Insecurity   Within the past 12 months, did you worry that your food would run out before you got money to buy more? N   Within the past 12 months, did the food you bought just not last and you didn t have money to get more? N            Health Care Directive:  Patient does not have a Health Care Directive or Living Will: Discussed advance care planning with patient; however, patient declined at this time.    Carol Williamson LPN      "

## 2024-09-17 ENCOUNTER — OFFICE VISIT (OUTPATIENT)
Dept: OTOLARYNGOLOGY | Facility: OTHER | Age: 84
End: 2024-09-17
Attending: OTOLARYNGOLOGY
Payer: MEDICARE

## 2024-09-17 DIAGNOSIS — K22.4 ESOPHAGEAL DYSMOTILITY: Primary | ICD-10-CM

## 2024-09-17 PROCEDURE — G0463 HOSPITAL OUTPT CLINIC VISIT: HCPCS

## 2024-09-17 NOTE — NURSING NOTE
"Chief Complaint   Patient presents with    Chronic Cough       Initial LMP 01/21/1997 (Approximate)  Estimated body mass index is 29.79 kg/m  as calculated from the following:    Height as of 9/10/24: 1.619 m (5' 3.75\").    Weight as of 9/10/24: 78.1 kg (172 lb 3.2 oz).  Medication Review: complete    The next two questions are to help us understand your food security.  If you are feeling you need any assistance in this area, we have resources available to support you today.          7/14/2024   SDOH- Food Insecurity   Within the past 12 months, did you worry that your food would run out before you got money to buy more? N   Within the past 12 months, did the food you bought just not last and you didn t have money to get more? N              Marco Faulkner      "

## 2024-09-27 NOTE — PROGRESS NOTES
document embedded image                                   [facility]                                                                                                                                         Patient Name: Madiha Beltran   Address: [address]   YOB: 1940   [place] [id number]   MR Number: [id number]   Phone: [phone]  PCP: Siddharth Kim MD           Appointment Date: 09/17/24  Visit Provider: Ho Reina MD    cc: ~    ENT Progress Note        Intake  Visit Reasons: Chronic cough    HPI  History of Present Illness  Chief complaint:  Swallowing difficulties     History  The patient is an 84-year-old female who comes to the office today because she feels difficulty getting a swallow started and completed.  It occasionally things we will catch in her throat and I will cause her to clear.  In looking over her chart she has had a workup for this in the past.  She has been diagnosed with a hiatal hernia.  She has an esophagram from 2018 that shows significant esophageal dysmotility in her thoracic esophagus.  She denies hemoptysis, hematemesis, voice change, otalgia.  She has had previous thyroidectomy in the past but notice no voice or swallowing difficulties following that surgery.    Exam   Oral cavity oropharynx-free of mucosal lesions or inflammation   Indirect laryngoscopy reveals neurologically intact larynx without mucosal lesion or inflammation   Neck-no palpable masses or adenopathy.  She does have a well-healed scar low in her neck from her previous thyroidectomy.  Nasal-no obstruction or purulence noted   Head and neck integument-Clear  General-the patient appears well and in no distress   Neuro-there are no focal cranial nerve deficits    Allergies    diatrizoate sodium Allergy (Verified 12/24/23 03:52)  Unknown  Sulfa (Sulfonamide Antibiotics) Allergy (Verified 12/24/23 03:52)  Unknown  penicillin G benzathine [From Bicillin C-R]  Adverse Reaction (Verified 12/24/23 04:39)  Fainting  penicillin G procaine [From Bicillin C-R] Adverse Reaction (Verified 12/24/23 03:52)  Fainting    PFSH  PFSH:   Past Medical History: (Reviewed 09/18/24 @ 09:04 by Michael Mcleod, Med Assist)    Choledocholithiasis  Diaphragmatic hernia  Depression  HLD (hyperlipidemia)  GERD (gastroesophageal reflux disease)  Endometrial cancer  s/p ILIANA  Osteoarthritis  Hypothyroidism  Abnormal LFTs    Past Surgical History: (Reviewed 09/18/24 @ 09:04 by Michael Mcleod, Med Assist)    S/P cholecystectomy    Family History Notes: reviewed and non-contribuotry     Social History: (Reviewed 09/18/24 @ 09:04 by Michael Mcleod, Med Assist)  Social History Notes::  remote tob use   no ETOH use   Smoking Status:  Former smoker     A&P  Assessment & Plan  (1) Esophageal dysmotility:         Status: Acute        Code(s):  K22.4 - Dyskinesia of esophagus  The patient has previously been diagnosed with a clear etiology for swallowing difficulties.  This is really out of my realm for treatment and further workup.  She should returned to her primary doctor to see if further assessment with a gastroenterologist would be warranted.                Ho Reina MD    Filed: 09/18/24 0905      <Electronically signed by Ho Reina MD> 09/18/24 0939

## 2024-10-18 ENCOUNTER — OFFICE VISIT (OUTPATIENT)
Dept: PEDIATRICS | Facility: OTHER | Age: 84
End: 2024-10-18
Attending: INTERNAL MEDICINE
Payer: COMMERCIAL

## 2024-10-18 VITALS
RESPIRATION RATE: 14 BRPM | SYSTOLIC BLOOD PRESSURE: 120 MMHG | WEIGHT: 169.2 LBS | TEMPERATURE: 98.6 F | DIASTOLIC BLOOD PRESSURE: 80 MMHG | OXYGEN SATURATION: 95 % | BODY MASS INDEX: 29.27 KG/M2

## 2024-10-18 DIAGNOSIS — I10 ESSENTIAL HYPERTENSION: Primary | Chronic | ICD-10-CM

## 2024-10-18 LAB
ANION GAP SERPL CALCULATED.3IONS-SCNC: 9 MMOL/L (ref 7–15)
BUN SERPL-MCNC: 19.9 MG/DL (ref 8–23)
CALCIUM SERPL-MCNC: 9.1 MG/DL (ref 8.8–10.4)
CHLORIDE SERPL-SCNC: 101 MMOL/L (ref 98–107)
CREAT SERPL-MCNC: 1.01 MG/DL (ref 0.51–0.95)
EGFRCR SERPLBLD CKD-EPI 2021: 55 ML/MIN/1.73M2
GLUCOSE SERPL-MCNC: 133 MG/DL (ref 70–99)
HCO3 SERPL-SCNC: 30 MMOL/L (ref 22–29)
POTASSIUM SERPL-SCNC: 3.8 MMOL/L (ref 3.4–5.3)
SODIUM SERPL-SCNC: 140 MMOL/L (ref 135–145)

## 2024-10-18 PROCEDURE — 80048 BASIC METABOLIC PNL TOTAL CA: CPT | Mod: ZL | Performed by: INTERNAL MEDICINE

## 2024-10-18 PROCEDURE — 99214 OFFICE O/P EST MOD 30 MIN: CPT | Performed by: INTERNAL MEDICINE

## 2024-10-18 PROCEDURE — G0463 HOSPITAL OUTPT CLINIC VISIT: HCPCS

## 2024-10-18 PROCEDURE — 36415 COLL VENOUS BLD VENIPUNCTURE: CPT | Mod: ZL | Performed by: INTERNAL MEDICINE

## 2024-10-18 PROCEDURE — G2211 COMPLEX E/M VISIT ADD ON: HCPCS | Performed by: INTERNAL MEDICINE

## 2024-10-18 ASSESSMENT — PAIN SCALES - GENERAL: PAINLEVEL: NO PAIN (0)

## 2024-10-18 NOTE — PROGRESS NOTES
Assessment & Plan   1. Essential hypertension  Patient transitioned to losartan starting 9/10 and home blood pressure measurements since then seem to have stabilized around 115-120/70-80. She notes no episodes of lightheadedness or dizziness or any adverse effects of the medication change. At goal, no change.  We will obtain a basic metabolic panel looking for the possibility of acute kidney injury or hyperkalemia.  Blood pressure monitoring is recommended.  Plan:  - Basic metabolic panel; Future  - Basic metabolic panel    Return if symptoms worsen or fail to improve.    Sherine Gomez MS3  [facility] Medical Student    Attestation Statement:  I was present with the medical student who participated in the service and in the documentation of this note. I have verified the history and personally performed the physical exam and medical decision making, and have verified the content of the note which accurately reflects my assessment of the patient and the plan of care.    Signed, Siddharth Kim MD, FAAP, FACP  Internal Medicine & Pediatrics  10/18/2024 9:32 AM      Subjective   Madiha Beltran is a 84 year old female who presents for Hypertension. Patient reports that she was switched to losartan from metoprolol starting 9/10. She recorded BP's daily since then and reports that any elevated BP's(130-140/80-90) she attributes to her  riling her up with election updates. Her lowest recorded BP was 98/60 but she denies any episodes of dizziness or lightheadedness in the past month.           Objective   Vitals: /80 (BP Location: Right arm)   Temp 98.6  F (37  C) (Tympanic)   Resp 14   Wt 76.7 kg (169 lb 3.2 oz)   LMP 01/21/1997 (Approximate)   SpO2 95%   BMI 29.27 kg/m      General: well appearing  CV: Regular rate and rhythm, no murmur, rub or gallop  Pulm: Clear to auscultation bilaterally, no wheezing, rales or rhonchi  Neuro: Grossly intact  Musculoskeletal: No lower extremity edema  Skin: No  rash  Psychiatry: Normal affect and insight.

## 2024-10-18 NOTE — NURSING NOTE
Patient presents to follow up on her blood pressure.  Love Nazario LPN.........................10/18/2024  8:06 AM

## 2024-10-23 ENCOUNTER — IMMUNIZATION (OUTPATIENT)
Dept: FAMILY MEDICINE | Facility: OTHER | Age: 84
End: 2024-10-23
Attending: INTERNAL MEDICINE
Payer: MEDICARE

## 2024-10-23 DIAGNOSIS — Z23 NEED FOR PROPHYLACTIC VACCINATION AND INOCULATION AGAINST INFLUENZA: Primary | ICD-10-CM

## 2024-10-23 DIAGNOSIS — Z23 HIGH PRIORITY FOR 2019-NCOV VACCINE: ICD-10-CM

## 2024-10-23 PROCEDURE — 90480 ADMN SARSCOV2 VAC 1/ONLY CMP: CPT

## 2024-10-23 PROCEDURE — G0008 ADMIN INFLUENZA VIRUS VAC: HCPCS

## 2024-11-26 ENCOUNTER — HOSPITAL ENCOUNTER (OUTPATIENT)
Dept: MAMMOGRAPHY | Facility: OTHER | Age: 84
Discharge: HOME | End: 2024-11-26
Attending: INTERNAL MEDICINE
Payer: MEDICARE

## 2024-11-26 DIAGNOSIS — Z12.31 ENCOUNTER FOR SCREENING MAMMOGRAM FOR BREAST CANCER: ICD-10-CM

## 2024-11-26 PROCEDURE — 77063 BREAST TOMOSYNTHESIS BI: CPT

## 2024-11-26 PROCEDURE — 77067 SCR MAMMO BI INCL CAD: CPT

## 2025-08-15 SDOH — HEALTH STABILITY: PHYSICAL HEALTH: ON AVERAGE, HOW MANY DAYS PER WEEK DO YOU ENGAGE IN MODERATE TO STRENUOUS EXERCISE (LIKE A BRISK WALK)?: 5 DAYS

## 2025-08-15 SDOH — HEALTH STABILITY: PHYSICAL HEALTH: ON AVERAGE, HOW MANY MINUTES DO YOU ENGAGE IN EXERCISE AT THIS LEVEL?: 30 MIN

## 2025-08-15 ASSESSMENT — SOCIAL DETERMINANTS OF HEALTH (SDOH): HOW OFTEN DO YOU GET TOGETHER WITH FRIENDS OR RELATIVES?: MORE THAN THREE TIMES A WEEK

## 2025-08-19 ENCOUNTER — OFFICE VISIT (OUTPATIENT)
Dept: PEDIATRICS | Facility: OTHER | Age: 85
End: 2025-08-19
Attending: INTERNAL MEDICINE
Payer: MEDICARE

## 2025-08-19 VITALS
HEIGHT: 64 IN | WEIGHT: 164.6 LBS | RESPIRATION RATE: 16 BRPM | DIASTOLIC BLOOD PRESSURE: 80 MMHG | OXYGEN SATURATION: 93 % | SYSTOLIC BLOOD PRESSURE: 128 MMHG | TEMPERATURE: 98.1 F | HEART RATE: 72 BPM | BODY MASS INDEX: 28.1 KG/M2

## 2025-08-19 DIAGNOSIS — K44.9 HIATAL HERNIA: ICD-10-CM

## 2025-08-19 DIAGNOSIS — R25.2 CRAMP OF LIMB: ICD-10-CM

## 2025-08-19 DIAGNOSIS — Z98.890 HISTORY OF SPHINCTEROTOMY OF SPHINCTER OF ODDI: ICD-10-CM

## 2025-08-19 DIAGNOSIS — Z98.890 S/P THYROIDECTOMY: ICD-10-CM

## 2025-08-19 DIAGNOSIS — N39.41 URGE INCONTINENCE: ICD-10-CM

## 2025-08-19 DIAGNOSIS — E87.6 DRUG-INDUCED HYPOKALEMIA: ICD-10-CM

## 2025-08-19 DIAGNOSIS — I10 BENIGN ESSENTIAL HYPERTENSION: ICD-10-CM

## 2025-08-19 DIAGNOSIS — M15.0 PRIMARY OSTEOARTHRITIS INVOLVING MULTIPLE JOINTS: ICD-10-CM

## 2025-08-19 DIAGNOSIS — Z00.00 ENCOUNTER FOR MEDICARE ANNUAL WELLNESS EXAM: Primary | ICD-10-CM

## 2025-08-19 DIAGNOSIS — Z23 NEED FOR VACCINATION: ICD-10-CM

## 2025-08-19 DIAGNOSIS — R73.03 PRE-DIABETES: ICD-10-CM

## 2025-08-19 DIAGNOSIS — E89.0 POSTOPERATIVE HYPOTHYROIDISM: ICD-10-CM

## 2025-08-19 DIAGNOSIS — Z13.220 LIPID SCREENING: ICD-10-CM

## 2025-08-19 DIAGNOSIS — E78.2 MIXED HYPERLIPIDEMIA: ICD-10-CM

## 2025-08-19 DIAGNOSIS — Z90.49 S/P CHOLECYSTECTOMY: ICD-10-CM

## 2025-08-19 DIAGNOSIS — K83.8 DILATED BILE DUCT: ICD-10-CM

## 2025-08-19 DIAGNOSIS — K22.4 ESOPHAGEAL DYSMOTILITY: ICD-10-CM

## 2025-08-19 DIAGNOSIS — F39 MOOD DISORDER: ICD-10-CM

## 2025-08-19 DIAGNOSIS — C54.9 MALIGNANT NEOPLASM OF CORPUS UTERI, EXCEPT ISTHMUS (H): ICD-10-CM

## 2025-08-19 DIAGNOSIS — N18.32 CHRONIC KIDNEY DISEASE, STAGE 3B (H): ICD-10-CM

## 2025-08-19 DIAGNOSIS — K21.9 GASTROESOPHAGEAL REFLUX DISEASE, UNSPECIFIED WHETHER ESOPHAGITIS PRESENT: ICD-10-CM

## 2025-08-19 DIAGNOSIS — T50.905A DRUG-INDUCED HYPOKALEMIA: ICD-10-CM

## 2025-08-19 DIAGNOSIS — K80.51 CALCULUS OF BILE DUCT WITHOUT CHOLECYSTITIS WITH OBSTRUCTION: ICD-10-CM

## 2025-08-19 DIAGNOSIS — Z90.89 S/P THYROIDECTOMY: ICD-10-CM

## 2025-08-19 DIAGNOSIS — Z90.710 S/P TOTAL HYSTERECTOMY: ICD-10-CM

## 2025-08-19 DIAGNOSIS — Z98.890 S/P ERCP: ICD-10-CM

## 2025-08-19 DIAGNOSIS — Z13.6 SCREENING FOR CARDIOVASCULAR CONDITION: ICD-10-CM

## 2025-08-19 DIAGNOSIS — R20.2 TINGLING IN EXTREMITIES: ICD-10-CM

## 2025-08-19 LAB
ANION GAP SERPL CALCULATED.3IONS-SCNC: 11 MMOL/L (ref 7–15)
BUN SERPL-MCNC: 13.9 MG/DL (ref 8–23)
CALCIUM SERPL-MCNC: 8.8 MG/DL (ref 8.8–10.4)
CHLORIDE SERPL-SCNC: 102 MMOL/L (ref 98–107)
CHOLEST SERPL-MCNC: 194 MG/DL
CREAT SERPL-MCNC: 1.08 MG/DL (ref 0.51–0.95)
CREAT UR-MCNC: 172.6 MG/DL
EGFRCR SERPLBLD CKD-EPI 2021: 51 ML/MIN/1.73M2
EST. AVERAGE GLUCOSE BLD GHB EST-MCNC: 117 MG/DL
FASTING STATUS PATIENT QL REPORTED: YES
FASTING STATUS PATIENT QL REPORTED: YES
GLUCOSE SERPL-MCNC: 110 MG/DL (ref 70–99)
HBA1C MFR BLD: 5.7 %
HCO3 SERPL-SCNC: 31 MMOL/L (ref 22–29)
HDLC SERPL-MCNC: 60 MG/DL
HGB BLD-MCNC: 14.8 G/DL (ref 11.7–15.7)
IRON BINDING CAPACITY (ROCHE): 278 UG/DL (ref 240–430)
IRON SATN MFR SERPL: 56 % (ref 15–46)
IRON SERPL-MCNC: 156 UG/DL (ref 37–145)
LDLC SERPL CALC-MCNC: 87 MG/DL
MAGNESIUM SERPL-MCNC: 1.7 MG/DL (ref 1.7–2.3)
MCV RBC AUTO: 95.5 FL (ref 78–100)
MICROALBUMIN UR-MCNC: 36.3 MG/L
MICROALBUMIN/CREAT UR: 21.03 MG/G CR (ref 0–25)
NONHDLC SERPL-MCNC: 134 MG/DL
POTASSIUM SERPL-SCNC: 3.4 MMOL/L (ref 3.4–5.3)
SODIUM SERPL-SCNC: 144 MMOL/L (ref 135–145)
T4 FREE SERPL-MCNC: 1.03 NG/DL (ref 0.9–1.7)
TRIGL SERPL-MCNC: 237 MG/DL
TSH SERPL DL<=0.005 MIU/L-ACNC: 14.85 UIU/ML (ref 0.3–4.2)
VIT B12 SERPL-MCNC: 440 PG/ML (ref 232–1245)
VIT D+METAB SERPL-MCNC: 74 NG/ML (ref 20–50)

## 2025-08-19 PROCEDURE — 36415 COLL VENOUS BLD VENIPUNCTURE: CPT | Mod: ZL | Performed by: INTERNAL MEDICINE

## 2025-08-19 PROCEDURE — 84443 ASSAY THYROID STIM HORMONE: CPT | Mod: ZL | Performed by: INTERNAL MEDICINE

## 2025-08-19 PROCEDURE — 82607 VITAMIN B-12: CPT | Mod: ZL | Performed by: INTERNAL MEDICINE

## 2025-08-19 PROCEDURE — 82306 VITAMIN D 25 HYDROXY: CPT | Mod: ZL | Performed by: INTERNAL MEDICINE

## 2025-08-19 PROCEDURE — 84439 ASSAY OF FREE THYROXINE: CPT | Mod: ZL | Performed by: INTERNAL MEDICINE

## 2025-08-19 PROCEDURE — 83550 IRON BINDING TEST: CPT | Mod: ZL | Performed by: INTERNAL MEDICINE

## 2025-08-19 PROCEDURE — 83735 ASSAY OF MAGNESIUM: CPT | Mod: ZL | Performed by: INTERNAL MEDICINE

## 2025-08-19 PROCEDURE — 83036 HEMOGLOBIN GLYCOSYLATED A1C: CPT | Mod: ZL | Performed by: INTERNAL MEDICINE

## 2025-08-19 PROCEDURE — G0463 HOSPITAL OUTPT CLINIC VISIT: HCPCS | Performed by: INTERNAL MEDICINE

## 2025-08-19 PROCEDURE — 82465 ASSAY BLD/SERUM CHOLESTEROL: CPT | Mod: ZL | Performed by: INTERNAL MEDICINE

## 2025-08-19 PROCEDURE — 80048 BASIC METABOLIC PNL TOTAL CA: CPT | Mod: ZL | Performed by: INTERNAL MEDICINE

## 2025-08-19 PROCEDURE — 85018 HEMOGLOBIN: CPT | Mod: ZL | Performed by: INTERNAL MEDICINE

## 2025-08-19 PROCEDURE — 82043 UR ALBUMIN QUANTITATIVE: CPT | Mod: ZL | Performed by: INTERNAL MEDICINE

## 2025-08-19 RX ORDER — CELECOXIB 100 MG/1
100 CAPSULE ORAL 2 TIMES DAILY
Qty: 180 CAPSULE | Refills: 4 | Status: SHIPPED | OUTPATIENT
Start: 2025-08-19

## 2025-08-19 RX ORDER — OXYBUTYNIN CHLORIDE 5 MG/1
5 TABLET ORAL DAILY
Qty: 90 TABLET | Refills: 4 | Status: SHIPPED | OUTPATIENT
Start: 2025-08-19

## 2025-08-19 RX ORDER — SUCRALFATE 1 G/1
1 TABLET ORAL
Qty: 270 TABLET | Refills: 4 | Status: SHIPPED | OUTPATIENT
Start: 2025-08-19

## 2025-08-19 RX ORDER — OMEPRAZOLE 20 MG/1
20 CAPSULE, DELAYED RELEASE ORAL DAILY
Qty: 90 CAPSULE | Refills: 4 | Status: SHIPPED | OUTPATIENT
Start: 2025-08-19

## 2025-08-19 RX ORDER — HYDROCHLOROTHIAZIDE 25 MG/1
25 TABLET ORAL DAILY
Qty: 90 TABLET | Refills: 4 | Status: SHIPPED | OUTPATIENT
Start: 2025-08-19

## 2025-08-19 RX ORDER — LEVOTHYROXINE SODIUM 125 UG/1
125 TABLET ORAL DAILY
Qty: 90 TABLET | Refills: 4 | Status: SHIPPED | OUTPATIENT
Start: 2025-08-19 | End: 2025-08-19 | Stop reason: DRUGHIGH

## 2025-08-19 RX ORDER — POTASSIUM CHLORIDE 750 MG/1
20 TABLET, EXTENDED RELEASE ORAL DAILY
Qty: 180 TABLET | Refills: 4 | Status: SHIPPED | OUTPATIENT
Start: 2025-08-19

## 2025-08-19 RX ORDER — ATORVASTATIN CALCIUM 40 MG/1
40 TABLET, FILM COATED ORAL DAILY
Qty: 90 TABLET | Refills: 4 | Status: SHIPPED | OUTPATIENT
Start: 2025-08-19

## 2025-08-19 ASSESSMENT — PAIN SCALES - GENERAL: PAINLEVEL_OUTOF10: NO PAIN (0)

## 2025-08-19 ASSESSMENT — PATIENT HEALTH QUESTIONNAIRE - PHQ9
SUM OF ALL RESPONSES TO PHQ QUESTIONS 1-9: 3
10. IF YOU CHECKED OFF ANY PROBLEMS, HOW DIFFICULT HAVE THESE PROBLEMS MADE IT FOR YOU TO DO YOUR WORK, TAKE CARE OF THINGS AT HOME, OR GET ALONG WITH OTHER PEOPLE: NOT DIFFICULT AT ALL
SUM OF ALL RESPONSES TO PHQ QUESTIONS 1-9: 3

## (undated) RX ORDER — SODIUM CHLORIDE, SODIUM LACTATE, POTASSIUM CHLORIDE, CALCIUM CHLORIDE 600; 310; 30; 20 MG/100ML; MG/100ML; MG/100ML; MG/100ML
INJECTION, SOLUTION INTRAVENOUS
Status: DISPENSED
Start: 2023-12-23

## (undated) RX ORDER — POTASSIUM CHLORIDE 1500 MG/1
TABLET, EXTENDED RELEASE ORAL
Status: DISPENSED
Start: 2023-12-23

## (undated) RX ORDER — SODIUM CHLORIDE 9 MG/ML
INJECTION, SOLUTION INTRAVENOUS
Status: DISPENSED
Start: 2022-04-21

## (undated) RX ORDER — PIPERACILLIN SODIUM, TAZOBACTAM SODIUM 3; .375 G/15ML; G/15ML
INJECTION, POWDER, LYOPHILIZED, FOR SOLUTION INTRAVENOUS
Status: DISPENSED
Start: 2023-12-23

## (undated) RX ORDER — ONDANSETRON 2 MG/ML
INJECTION INTRAMUSCULAR; INTRAVENOUS
Status: DISPENSED
Start: 2022-04-21